# Patient Record
Sex: FEMALE | Race: WHITE | NOT HISPANIC OR LATINO | Employment: OTHER | URBAN - NONMETROPOLITAN AREA
[De-identification: names, ages, dates, MRNs, and addresses within clinical notes are randomized per-mention and may not be internally consistent; named-entity substitution may affect disease eponyms.]

---

## 2017-03-15 RX ORDER — RANITIDINE 150 MG/1
TABLET ORAL
Qty: 30 TABLET | Refills: 0 | Status: SHIPPED | OUTPATIENT
Start: 2017-03-15 | End: 2017-04-04

## 2017-04-04 ENCOUNTER — OFFICE VISIT (OUTPATIENT)
Dept: FAMILY MEDICINE CLINIC | Facility: CLINIC | Age: 57
End: 2017-04-04

## 2017-04-04 VITALS
TEMPERATURE: 98.9 F | BODY MASS INDEX: 43.32 KG/M2 | HEART RATE: 109 BPM | WEIGHT: 276 LBS | HEIGHT: 67 IN | DIASTOLIC BLOOD PRESSURE: 90 MMHG | OXYGEN SATURATION: 94 % | SYSTOLIC BLOOD PRESSURE: 150 MMHG

## 2017-04-04 DIAGNOSIS — E55.9 VITAMIN D DEFICIENCY: ICD-10-CM

## 2017-04-04 DIAGNOSIS — Z13.9 SCREENING: ICD-10-CM

## 2017-04-04 DIAGNOSIS — Z72.0 TOBACCO ABUSE: Primary | ICD-10-CM

## 2017-04-04 DIAGNOSIS — E78.2 MIXED HYPERLIPIDEMIA: ICD-10-CM

## 2017-04-04 DIAGNOSIS — G44.029 CHRONIC CLUSTER HEADACHE, NOT INTRACTABLE: ICD-10-CM

## 2017-04-04 DIAGNOSIS — E03.8 OTHER SPECIFIED HYPOTHYROIDISM: ICD-10-CM

## 2017-04-04 DIAGNOSIS — E11.9 TYPE 2 DIABETES MELLITUS WITHOUT COMPLICATION, WITHOUT LONG-TERM CURRENT USE OF INSULIN (HCC): ICD-10-CM

## 2017-04-04 PROCEDURE — 99215 OFFICE O/P EST HI 40 MIN: CPT | Performed by: NURSE PRACTITIONER

## 2017-04-04 PROCEDURE — 99406 BEHAV CHNG SMOKING 3-10 MIN: CPT | Performed by: NURSE PRACTITIONER

## 2017-04-04 RX ORDER — LANCETS 28 GAUGE
EACH MISCELLANEOUS
Qty: 100 EACH | Refills: 12 | Status: SHIPPED | OUTPATIENT
Start: 2017-04-04 | End: 2017-08-16 | Stop reason: SDUPTHER

## 2017-04-04 RX ORDER — LISINOPRIL 5 MG/1
5 TABLET ORAL DAILY
Qty: 30 TABLET | Refills: 5 | Status: SHIPPED | OUTPATIENT
Start: 2017-04-04 | End: 2017-11-16 | Stop reason: ALTCHOICE

## 2017-04-04 RX ORDER — CYCLOBENZAPRINE HCL 5 MG
5 TABLET ORAL 2 TIMES DAILY PRN
Qty: 30 TABLET | Refills: 3 | Status: SHIPPED | OUTPATIENT
Start: 2017-04-04 | End: 2017-08-16 | Stop reason: SDUPTHER

## 2017-04-04 RX ORDER — LORATADINE 10 MG/1
10 TABLET ORAL DAILY PRN
Qty: 30 TABLET | Refills: 5 | Status: SHIPPED | OUTPATIENT
Start: 2017-04-04 | End: 2017-08-16 | Stop reason: SDUPTHER

## 2017-04-04 RX ORDER — PROMETHAZINE HYDROCHLORIDE 25 MG/1
25 TABLET ORAL EVERY 8 HOURS PRN
Qty: 20 TABLET | Refills: 3 | Status: SHIPPED | OUTPATIENT
Start: 2017-04-04 | End: 2017-08-16 | Stop reason: SDUPTHER

## 2017-04-04 RX ORDER — RANITIDINE 150 MG/1
150 TABLET ORAL NIGHTLY
Qty: 30 TABLET | Refills: 3 | Status: SHIPPED | OUTPATIENT
Start: 2017-04-04 | End: 2017-08-16 | Stop reason: SDUPTHER

## 2017-04-04 RX ORDER — IBUPROFEN 600 MG/1
600 TABLET ORAL EVERY 8 HOURS PRN
Qty: 30 TABLET | Refills: 3 | Status: SHIPPED | OUTPATIENT
Start: 2017-04-04 | End: 2017-08-16

## 2017-04-04 NOTE — PROGRESS NOTES
Subjective   Donna Israel is a 57 y.o. female.     History of Present Illness       Pt states that she has not been seen by a health provider for a few years.   She is requesting refills on her medication though she does report that she has not been on any medication for approximately a year.     Tobacco abuse  Smoker, approximately one pack a day for many years.     Cluster headaches  Diagnosed years ago. She is requesting phenergan, Ibuprofen and flexeril as in the past this helped her headaches. No known triggers. Headaches are less often than daily.     Type 2 diabetes   Pt reports gradual weight gain over the past year. She has not been checking her glucose or taking any medication. She admits to a poor diet. No recent labs.     Hypothyroidism  No recent medication or labs.     Hyperlipidemia  No recent medication or labs.    Vitamin D def  No recent medication or labs.     Hypertension  Has not been taking any medication. Not exercising. No recent labs.     GERD  States that her symptoms are controlled with zantac. She does admit to eating spicy foods. No recent EGD. Never had a colonoscopy.    The following portions of the patient's history were reviewed and updated as appropriate: allergies, current medications, past family history, past medical history, past social history, past surgical history and problem list.    Review of Systems   Constitutional: Negative for appetite change, chills, fatigue and fever.   HENT: Positive for sneezing.    Eyes: Positive for itching.   Respiratory: Negative for cough, chest tightness, shortness of breath and wheezing.    Cardiovascular: Positive for leg swelling. Negative for chest pain and palpitations.   Gastrointestinal: Negative for abdominal pain, blood in stool, constipation, diarrhea, nausea and vomiting.   Endocrine: Negative.    Genitourinary: Negative for dysuria.   Musculoskeletal: Positive for arthralgias, back pain and myalgias.   Skin: Negative for rash.    Allergic/Immunologic: Negative.    Neurological: Positive for headaches. Negative for syncope and speech difficulty.   Hematological: Negative.    Psychiatric/Behavioral: Negative for self-injury and suicidal ideas.   All other systems reviewed and are negative.      Objective   Physical Exam   Constitutional: She is oriented to person, place, and time. She appears well-developed and well-nourished. No distress.   HENT:   Head: Normocephalic.   Right Ear: External ear normal.   Left Ear: External ear normal.   Nose: Nose normal.   Mouth/Throat: Oropharynx is clear and moist. No oropharyngeal exudate.   Eyes: Lids are normal. Pupils are equal, round, and reactive to light.   Neck: Normal range of motion. Neck supple. No tracheal deviation present. No thyromegaly present.   Cardiovascular: Normal rate, regular rhythm and normal heart sounds.  Exam reveals no gallop and no friction rub.    No murmur heard.  Pulmonary/Chest: Effort normal and breath sounds normal. No respiratory distress. She has no wheezes. She has no rales. She exhibits no tenderness.   Abdominal: Soft. Normal appearance and bowel sounds are normal. She exhibits no distension and no mass. There is no tenderness. There is no rebound and no guarding.   Musculoskeletal: Normal range of motion.        Right knee: Tenderness found.        Left knee: Tenderness found.   Some generalized edema of lower extremities bilateral, non-pitting.    Neurological: She is alert and oriented to person, place, and time. She has normal reflexes.   CN 2-12 grossly intact    Skin: Skin is warm and dry. No rash noted. She is not diaphoretic. No erythema.   Psychiatric: She has a normal mood and affect. Her speech is normal and behavior is normal. Judgment and thought content normal. Cognition and memory are normal.   Vitals reviewed.      Assessment/Plan   Donna was seen today for establish care.    Diagnoses and all orders for this visit:    Tobacco abuse    Type 2  diabetes mellitus without complication, without long-term current use of insulin  -     CBC & Differential; Future  -     Comprehensive Metabolic Panel; Future  -     Lipid Panel; Future  -     TSH; Future  -     Hemoglobin A1c; Future    Chronic cluster headache, not intractable    Other specified hypothyroidism    Mixed hyperlipidemia    Vitamin D deficiency  -     Vitamin D 25 Hydroxy; Future    Screening  -     Vitamin B12; Future  -     Ambulatory Referral For Screening Colonoscopy  -     Ambulatory referral for Screening EGD    Other orders  -     promethazine (PHENERGAN) 25 MG tablet; Take 1 tablet by mouth Every 8 (Eight) Hours As Needed for Nausea or Vomiting.  -     ibuprofen (ADVIL,MOTRIN) 600 MG tablet; Take 1 tablet by mouth Every 8 (Eight) Hours As Needed for Mild Pain (1-3) or Headache.  -     cyclobenzaprine (FLEXERIL) 5 MG tablet; Take 1 tablet by mouth 2 (Two) Times a Day As Needed for Muscle Spasms.  -     lisinopril (PRINIVIL,ZESTRIL) 5 MG tablet; Take 1 tablet by mouth Daily.  -     raNITIdine (ZANTAC) 150 MG tablet; Take 1 tablet by mouth Every Night.  -     loratadine (CLARITIN) 10 MG tablet; Take 1 tablet by mouth Daily As Needed for Allergies.  -     Blood Glucose Monitoring Suppl kit; 1 kit 2 (Two) Times a Day.  -     glucose blood (COOL BLOOD GLUCOSE TEST STRIPS) test strip; Use as instructed  -     Lancets (FREESTYLE) lancets; Use as instructed      I have discussed diagnosis in detail today allowing time for questions and answers. Pt is aware of reasons to seek urgent or emergent medical care as well as reasons to return to the clinic for evaluation. Possible side effects, interactions and progression of symptoms discussed as well. Pt / family states understanding.   Will order fasting labs prior to starting diabetes and thyroid medications.   Have instructed pt that she is not to take phenergan, flexeril or Ibuprofen on a daily basis and that these medications are to be used for prn  headache treatment only. She states understanding  Refer for colonoscopy and EGD for screening.  Start testing blood sugar twice daily, fasting and two hours post meal. Keep and bring log to next visit.   Pt has been educated/instructed on diabetic care and protocols. Sick day rules reviewed. Continue to monitor blood sugar and report abnormal readings as discussed today. Pt / family state understanding.   Follow up 4-6 weeks, sooner if needed.   Time > 50 minutes  Smoke cessation education provided > 3 minutes. Pt declines smoke cessation at this time.

## 2017-04-07 ENCOUNTER — LAB (OUTPATIENT)
Dept: FAMILY MEDICINE CLINIC | Facility: CLINIC | Age: 57
End: 2017-04-07

## 2017-04-07 DIAGNOSIS — Z13.9 SCREENING: ICD-10-CM

## 2017-04-07 DIAGNOSIS — E11.9 TYPE 2 DIABETES MELLITUS WITHOUT COMPLICATION, WITHOUT LONG-TERM CURRENT USE OF INSULIN (HCC): ICD-10-CM

## 2017-04-07 DIAGNOSIS — E55.9 VITAMIN D DEFICIENCY: ICD-10-CM

## 2017-04-07 LAB
25(OH)D3 SERPL-MCNC: 6 NG/ML
ALBUMIN SERPL-MCNC: 4.8 G/DL (ref 3.5–5)
ALBUMIN/GLOB SERPL: 1.7 G/DL (ref 1.5–2.5)
ALP SERPL-CCNC: 101 U/L (ref 35–104)
ALT SERPL W P-5'-P-CCNC: 67 U/L (ref 10–36)
ANION GAP SERPL CALCULATED.3IONS-SCNC: 6.9 MMOL/L (ref 3.6–11.2)
AST SERPL-CCNC: 73 U/L (ref 10–30)
BASOPHILS # BLD AUTO: 0.07 10*3/MM3 (ref 0–0.3)
BASOPHILS NFR BLD AUTO: 1.1 % (ref 0–2)
BILIRUB SERPL-MCNC: 0.7 MG/DL (ref 0.2–1.8)
BUN BLD-MCNC: 9 MG/DL (ref 7–21)
BUN/CREAT SERPL: 8.6 (ref 7–25)
CALCIUM SPEC-SCNC: 9.1 MG/DL (ref 7.7–10)
CHLORIDE SERPL-SCNC: 102 MMOL/L (ref 99–112)
CHOLEST SERPL-MCNC: 284 MG/DL (ref 0–200)
CO2 SERPL-SCNC: 25.1 MMOL/L (ref 24.3–31.9)
CREAT BLD-MCNC: 1.05 MG/DL (ref 0.43–1.29)
DEPRECATED RDW RBC AUTO: 47.6 FL (ref 37–54)
EOSINOPHIL # BLD AUTO: 0.16 10*3/MM3 (ref 0–0.7)
EOSINOPHIL NFR BLD AUTO: 2.5 % (ref 0–5)
ERYTHROCYTE [DISTWIDTH] IN BLOOD BY AUTOMATED COUNT: 13.7 % (ref 11.5–14.5)
GFR SERPL CREATININE-BSD FRML MDRD: 54 ML/MIN/1.73
GLOBULIN UR ELPH-MCNC: 2.8 GM/DL
GLUCOSE BLD-MCNC: 237 MG/DL (ref 70–110)
HBA1C MFR BLD: 9.8 % (ref 4.5–5.7)
HCT VFR BLD AUTO: 43.7 % (ref 37–47)
HDLC SERPL-MCNC: 39 MG/DL (ref 60–100)
HGB BLD-MCNC: 14.7 G/DL (ref 12–16)
IMM GRANULOCYTES # BLD: 0.07 10*3/MM3 (ref 0–0.03)
IMM GRANULOCYTES NFR BLD: 1.1 % (ref 0–0.5)
LDLC SERPL CALC-MCNC: ABNORMAL MG/DL (ref 0–100)
LDLC/HDLC SERPL: ABNORMAL {RATIO}
LYMPHOCYTES # BLD AUTO: 1.85 10*3/MM3 (ref 1–3)
LYMPHOCYTES NFR BLD AUTO: 29 % (ref 21–51)
MCH RBC QN AUTO: 32.8 PG (ref 27–33)
MCHC RBC AUTO-ENTMCNC: 33.6 G/DL (ref 33–37)
MCV RBC AUTO: 97.5 FL (ref 80–94)
MONOCYTES # BLD AUTO: 0.31 10*3/MM3 (ref 0.1–0.9)
MONOCYTES NFR BLD AUTO: 4.9 % (ref 0–10)
NEUTROPHILS # BLD AUTO: 3.92 10*3/MM3 (ref 1.4–6.5)
NEUTROPHILS NFR BLD AUTO: 61.4 % (ref 30–70)
OSMOLALITY SERPL CALC.SUM OF ELEC: 274.6 MOSM/KG (ref 273–305)
PLATELET # BLD AUTO: 185 10*3/MM3 (ref 130–400)
PMV BLD AUTO: 9.9 FL (ref 6–10)
POTASSIUM BLD-SCNC: 4 MMOL/L (ref 3.5–5.3)
PROT SERPL-MCNC: 7.6 G/DL (ref 6–8)
RBC # BLD AUTO: 4.48 10*6/MM3 (ref 4.2–5.4)
SODIUM BLD-SCNC: 134 MMOL/L (ref 135–153)
TRIGL SERPL-MCNC: 926 MG/DL (ref 0–150)
TSH SERPL DL<=0.05 MIU/L-ACNC: >150 MIU/ML (ref 0.55–4.78)
VIT B12 BLD-MCNC: 471 PG/ML (ref 211–911)
VLDLC SERPL-MCNC: ABNORMAL MG/DL
WBC NRBC COR # BLD: 6.38 10*3/MM3 (ref 4.5–12.5)

## 2017-04-07 PROCEDURE — 85025 COMPLETE CBC W/AUTO DIFF WBC: CPT | Performed by: NURSE PRACTITIONER

## 2017-04-07 PROCEDURE — 82306 VITAMIN D 25 HYDROXY: CPT | Performed by: NURSE PRACTITIONER

## 2017-04-07 PROCEDURE — 80061 LIPID PANEL: CPT | Performed by: NURSE PRACTITIONER

## 2017-04-07 PROCEDURE — 36415 COLL VENOUS BLD VENIPUNCTURE: CPT

## 2017-04-07 PROCEDURE — 84443 ASSAY THYROID STIM HORMONE: CPT | Performed by: NURSE PRACTITIONER

## 2017-04-07 PROCEDURE — 83036 HEMOGLOBIN GLYCOSYLATED A1C: CPT | Performed by: NURSE PRACTITIONER

## 2017-04-07 PROCEDURE — 82607 VITAMIN B-12: CPT | Performed by: NURSE PRACTITIONER

## 2017-04-07 PROCEDURE — 80053 COMPREHEN METABOLIC PANEL: CPT | Performed by: NURSE PRACTITIONER

## 2017-04-12 RX ORDER — ATORVASTATIN CALCIUM 20 MG/1
20 TABLET, FILM COATED ORAL DAILY
Qty: 30 TABLET | Refills: 5 | Status: SHIPPED | OUTPATIENT
Start: 2017-04-12 | End: 2017-08-08 | Stop reason: DRUGHIGH

## 2017-04-12 RX ORDER — LEVOTHYROXINE SODIUM 137 UG/1
137 TABLET ORAL DAILY
Qty: 30 TABLET | Refills: 5 | Status: SHIPPED | OUTPATIENT
Start: 2017-04-12 | End: 2017-08-16 | Stop reason: SDUPTHER

## 2017-05-16 ENCOUNTER — OFFICE VISIT (OUTPATIENT)
Dept: FAMILY MEDICINE CLINIC | Facility: CLINIC | Age: 57
End: 2017-05-16

## 2017-05-16 VITALS
DIASTOLIC BLOOD PRESSURE: 80 MMHG | SYSTOLIC BLOOD PRESSURE: 120 MMHG | BODY MASS INDEX: 43.47 KG/M2 | WEIGHT: 277 LBS | HEIGHT: 67 IN | HEART RATE: 95 BPM | TEMPERATURE: 99.2 F | OXYGEN SATURATION: 97 %

## 2017-05-16 DIAGNOSIS — E78.2 MIXED HYPERLIPIDEMIA: ICD-10-CM

## 2017-05-16 DIAGNOSIS — E03.9 ACQUIRED HYPOTHYROIDISM: ICD-10-CM

## 2017-05-16 DIAGNOSIS — E55.9 VITAMIN D DEFICIENCY: ICD-10-CM

## 2017-05-16 DIAGNOSIS — G44.029 CHRONIC CLUSTER HEADACHE, NOT INTRACTABLE: ICD-10-CM

## 2017-05-16 DIAGNOSIS — E11.9 TYPE 2 DIABETES MELLITUS WITHOUT COMPLICATION, WITHOUT LONG-TERM CURRENT USE OF INSULIN (HCC): Primary | ICD-10-CM

## 2017-05-16 DIAGNOSIS — R94.4 DECREASED GLOMERULAR FILTRATION RATE (GFR): ICD-10-CM

## 2017-05-16 PROCEDURE — 99214 OFFICE O/P EST MOD 30 MIN: CPT | Performed by: NURSE PRACTITIONER

## 2017-05-16 RX ORDER — ERGOCALCIFEROL 1.25 MG/1
50000 CAPSULE ORAL WEEKLY
Qty: 4 CAPSULE | Refills: 5 | Status: SHIPPED | OUTPATIENT
Start: 2017-05-16 | End: 2017-08-16 | Stop reason: SDUPTHER

## 2017-06-19 ENCOUNTER — TRANSCRIBE ORDERS (OUTPATIENT)
Dept: FAMILY MEDICINE CLINIC | Facility: CLINIC | Age: 57
End: 2017-06-19

## 2017-06-19 DIAGNOSIS — E11.9 TYPE 2 DIABETES MELLITUS WITHOUT COMPLICATION, UNSPECIFIED LONG TERM INSULIN USE STATUS: Primary | ICD-10-CM

## 2017-08-08 ENCOUNTER — LAB (OUTPATIENT)
Dept: FAMILY MEDICINE CLINIC | Facility: CLINIC | Age: 57
End: 2017-08-08

## 2017-08-08 DIAGNOSIS — E11.9 TYPE 2 DIABETES MELLITUS WITHOUT COMPLICATION, WITHOUT LONG-TERM CURRENT USE OF INSULIN (HCC): ICD-10-CM

## 2017-08-08 DIAGNOSIS — E55.9 VITAMIN D DEFICIENCY: ICD-10-CM

## 2017-08-08 DIAGNOSIS — E78.2 MIXED HYPERLIPIDEMIA: ICD-10-CM

## 2017-08-08 LAB
25(OH)D3 SERPL-MCNC: 34 NG/ML
ALBUMIN SERPL-MCNC: 4.7 G/DL (ref 3.5–5)
ALBUMIN/GLOB SERPL: 1.7 G/DL (ref 1.5–2.5)
ALP SERPL-CCNC: 80 U/L (ref 35–104)
ALT SERPL W P-5'-P-CCNC: 46 U/L (ref 10–36)
ANION GAP SERPL CALCULATED.3IONS-SCNC: 6.8 MMOL/L (ref 3.6–11.2)
AST SERPL-CCNC: 48 U/L (ref 10–30)
BASOPHILS # BLD AUTO: 0.05 10*3/MM3 (ref 0–0.3)
BASOPHILS NFR BLD AUTO: 0.6 % (ref 0–2)
BILIRUB SERPL-MCNC: 0.4 MG/DL (ref 0.2–1.8)
BUN BLD-MCNC: 10 MG/DL (ref 7–21)
BUN/CREAT SERPL: 11.5 (ref 7–25)
CALCIUM SPEC-SCNC: 9.7 MG/DL (ref 7.7–10)
CHLORIDE SERPL-SCNC: 111 MMOL/L (ref 99–112)
CHOLEST SERPL-MCNC: 221 MG/DL (ref 0–200)
CO2 SERPL-SCNC: 21.2 MMOL/L (ref 24.3–31.9)
CREAT BLD-MCNC: 0.87 MG/DL (ref 0.43–1.29)
DEPRECATED RDW RBC AUTO: 48.2 FL (ref 37–54)
EOSINOPHIL # BLD AUTO: 0.2 10*3/MM3 (ref 0–0.7)
EOSINOPHIL NFR BLD AUTO: 2.5 % (ref 0–5)
ERYTHROCYTE [DISTWIDTH] IN BLOOD BY AUTOMATED COUNT: 13 % (ref 11.5–14.5)
GFR SERPL CREATININE-BSD FRML MDRD: 67 ML/MIN/1.73
GLOBULIN UR ELPH-MCNC: 2.8 GM/DL
GLUCOSE BLD-MCNC: 177 MG/DL (ref 70–110)
HBA1C MFR BLD: 6.5 % (ref 4.5–5.7)
HCT VFR BLD AUTO: 43.4 % (ref 37–47)
HDLC SERPL-MCNC: 29 MG/DL (ref 60–100)
HGB BLD-MCNC: 14.2 G/DL (ref 12–16)
IMM GRANULOCYTES # BLD: 0.03 10*3/MM3 (ref 0–0.03)
IMM GRANULOCYTES NFR BLD: 0.4 % (ref 0–0.5)
LDLC SERPL CALC-MCNC: ABNORMAL MG/DL (ref 0–100)
LDLC/HDLC SERPL: ABNORMAL {RATIO}
LYMPHOCYTES # BLD AUTO: 1.71 10*3/MM3 (ref 1–3)
LYMPHOCYTES NFR BLD AUTO: 21.7 % (ref 21–51)
MCH RBC QN AUTO: 33.6 PG (ref 27–33)
MCHC RBC AUTO-ENTMCNC: 32.7 G/DL (ref 33–37)
MCV RBC AUTO: 102.8 FL (ref 80–94)
MONOCYTES # BLD AUTO: 0.39 10*3/MM3 (ref 0.1–0.9)
MONOCYTES NFR BLD AUTO: 4.9 % (ref 0–10)
NEUTROPHILS # BLD AUTO: 5.51 10*3/MM3 (ref 1.4–6.5)
NEUTROPHILS NFR BLD AUTO: 69.9 % (ref 30–70)
OSMOLALITY SERPL CALC.SUM OF ELEC: 280.9 MOSM/KG (ref 273–305)
PLATELET # BLD AUTO: 217 10*3/MM3 (ref 130–400)
PMV BLD AUTO: 10 FL (ref 6–10)
POTASSIUM BLD-SCNC: 3.9 MMOL/L (ref 3.5–5.3)
PROT SERPL-MCNC: 7.5 G/DL (ref 6–8)
RBC # BLD AUTO: 4.22 10*6/MM3 (ref 4.2–5.4)
SODIUM BLD-SCNC: 139 MMOL/L (ref 135–153)
TRIGL SERPL-MCNC: 897 MG/DL (ref 0–150)
TSH SERPL DL<=0.05 MIU/L-ACNC: 94.64 MIU/ML (ref 0.55–4.78)
VLDLC SERPL-MCNC: ABNORMAL MG/DL
WBC NRBC COR # BLD: 7.89 10*3/MM3 (ref 4.5–12.5)

## 2017-08-08 PROCEDURE — 85025 COMPLETE CBC W/AUTO DIFF WBC: CPT | Performed by: NURSE PRACTITIONER

## 2017-08-08 PROCEDURE — 84443 ASSAY THYROID STIM HORMONE: CPT | Performed by: NURSE PRACTITIONER

## 2017-08-08 PROCEDURE — 80061 LIPID PANEL: CPT | Performed by: NURSE PRACTITIONER

## 2017-08-08 PROCEDURE — 36415 COLL VENOUS BLD VENIPUNCTURE: CPT

## 2017-08-08 PROCEDURE — 80053 COMPREHEN METABOLIC PANEL: CPT | Performed by: NURSE PRACTITIONER

## 2017-08-08 PROCEDURE — 83036 HEMOGLOBIN GLYCOSYLATED A1C: CPT | Performed by: NURSE PRACTITIONER

## 2017-08-08 PROCEDURE — 82306 VITAMIN D 25 HYDROXY: CPT | Performed by: NURSE PRACTITIONER

## 2017-08-08 NOTE — PROGRESS NOTES
Called and informed patient about med change and to start the lopid and to try to stay with a heart healthy diet. Called in fills to pharmacy and placed in chart. PKF

## 2017-08-13 RX ORDER — ATORVASTATIN CALCIUM 40 MG/1
40 TABLET, FILM COATED ORAL DAILY
Qty: 30 TABLET | Refills: 2
Start: 2017-08-13 | End: 2017-08-16 | Stop reason: SDUPTHER

## 2017-08-13 RX ORDER — GEMFIBROZIL 600 MG/1
600 TABLET, FILM COATED ORAL
Qty: 60 TABLET | Refills: 2
Start: 2017-08-13 | End: 2017-08-16 | Stop reason: SDUPTHER

## 2017-08-16 ENCOUNTER — OFFICE VISIT (OUTPATIENT)
Dept: FAMILY MEDICINE CLINIC | Facility: CLINIC | Age: 57
End: 2017-08-16

## 2017-08-16 VITALS
SYSTOLIC BLOOD PRESSURE: 120 MMHG | HEART RATE: 78 BPM | WEIGHT: 253 LBS | OXYGEN SATURATION: 94 % | BODY MASS INDEX: 39.71 KG/M2 | TEMPERATURE: 97.5 F | DIASTOLIC BLOOD PRESSURE: 80 MMHG | HEIGHT: 67 IN

## 2017-08-16 DIAGNOSIS — E78.2 MIXED HYPERLIPIDEMIA: ICD-10-CM

## 2017-08-16 DIAGNOSIS — K21.9 GASTROESOPHAGEAL REFLUX DISEASE, ESOPHAGITIS PRESENCE NOT SPECIFIED: ICD-10-CM

## 2017-08-16 DIAGNOSIS — Z72.0 TOBACCO ABUSE: ICD-10-CM

## 2017-08-16 DIAGNOSIS — N18.2 TYPE 2 DIABETES MELLITUS WITH STAGE 2 CHRONIC KIDNEY DISEASE, WITHOUT LONG-TERM CURRENT USE OF INSULIN (HCC): ICD-10-CM

## 2017-08-16 DIAGNOSIS — E11.22 TYPE 2 DIABETES MELLITUS WITH STAGE 2 CHRONIC KIDNEY DISEASE, WITHOUT LONG-TERM CURRENT USE OF INSULIN (HCC): ICD-10-CM

## 2017-08-16 DIAGNOSIS — E11.9 TYPE 2 DIABETES MELLITUS WITHOUT COMPLICATION, WITHOUT LONG-TERM CURRENT USE OF INSULIN (HCC): ICD-10-CM

## 2017-08-16 DIAGNOSIS — E11.59 TYPE 2 DIABETES MELLITUS WITH OTHER CIRCULATORY COMPLICATION, WITHOUT LONG-TERM CURRENT USE OF INSULIN (HCC): ICD-10-CM

## 2017-08-16 DIAGNOSIS — E55.9 VITAMIN D DEFICIENCY: ICD-10-CM

## 2017-08-16 DIAGNOSIS — E03.9 ACQUIRED HYPOTHYROIDISM: ICD-10-CM

## 2017-08-16 DIAGNOSIS — E78.2 MIXED HYPERLIPIDEMIA: Primary | ICD-10-CM

## 2017-08-16 DIAGNOSIS — G44.029 CHRONIC CLUSTER HEADACHE, NOT INTRACTABLE: ICD-10-CM

## 2017-08-16 DIAGNOSIS — M50.30 DEGENERATION OF INTERVERTEBRAL DISC OF CERVICAL REGION: ICD-10-CM

## 2017-08-16 LAB
CHOLEST SERPL-MCNC: 197 MG/DL (ref 0–200)
HDLC SERPL-MCNC: 29 MG/DL (ref 60–100)
LDLC SERPL CALC-MCNC: ABNORMAL MG/DL (ref 0–100)
LDLC/HDLC SERPL: ABNORMAL {RATIO}
TRIGL SERPL-MCNC: 448 MG/DL (ref 0–150)
TSH SERPL DL<=0.05 MIU/L-ACNC: 74.98 MIU/ML (ref 0.55–4.78)
VLDLC SERPL-MCNC: ABNORMAL MG/DL

## 2017-08-16 PROCEDURE — 84443 ASSAY THYROID STIM HORMONE: CPT | Performed by: NURSE PRACTITIONER

## 2017-08-16 PROCEDURE — 99407 BEHAV CHNG SMOKING > 10 MIN: CPT | Performed by: NURSE PRACTITIONER

## 2017-08-16 PROCEDURE — 99215 OFFICE O/P EST HI 40 MIN: CPT | Performed by: NURSE PRACTITIONER

## 2017-08-16 PROCEDURE — 36415 COLL VENOUS BLD VENIPUNCTURE: CPT | Performed by: NURSE PRACTITIONER

## 2017-08-16 PROCEDURE — 80061 LIPID PANEL: CPT | Performed by: NURSE PRACTITIONER

## 2017-08-16 RX ORDER — PROMETHAZINE HYDROCHLORIDE 25 MG/1
25 TABLET ORAL EVERY 8 HOURS PRN
Qty: 20 TABLET | Refills: 3 | Status: SHIPPED | OUTPATIENT
Start: 2017-08-16 | End: 2017-11-16 | Stop reason: SDUPTHER

## 2017-08-16 RX ORDER — ERGOCALCIFEROL 1.25 MG/1
50000 CAPSULE ORAL WEEKLY
Qty: 4 CAPSULE | Refills: 5 | Status: SHIPPED | OUTPATIENT
Start: 2017-08-16 | End: 2017-11-16 | Stop reason: SDUPTHER

## 2017-08-16 RX ORDER — CYCLOBENZAPRINE HCL 5 MG
5 TABLET ORAL 2 TIMES DAILY PRN
Qty: 30 TABLET | Refills: 3 | Status: SHIPPED | OUTPATIENT
Start: 2017-08-16 | End: 2017-11-16 | Stop reason: SDUPTHER

## 2017-08-16 RX ORDER — LANCETS 28 GAUGE
EACH MISCELLANEOUS
Qty: 100 EACH | Refills: 12 | Status: SHIPPED | OUTPATIENT
Start: 2017-08-16 | End: 2017-11-16 | Stop reason: SDUPTHER

## 2017-08-16 RX ORDER — LORATADINE 10 MG/1
10 TABLET ORAL DAILY PRN
Qty: 30 TABLET | Refills: 5 | Status: SHIPPED | OUTPATIENT
Start: 2017-08-16 | End: 2017-11-16 | Stop reason: SDUPTHER

## 2017-08-16 RX ORDER — ATORVASTATIN CALCIUM 40 MG/1
40 TABLET, FILM COATED ORAL DAILY
Qty: 30 TABLET | Refills: 2
Start: 2017-08-16 | End: 2017-11-16 | Stop reason: SDUPTHER

## 2017-08-16 RX ORDER — RANITIDINE 150 MG/1
150 TABLET ORAL NIGHTLY
Qty: 30 TABLET | Refills: 3 | Status: SHIPPED | OUTPATIENT
Start: 2017-08-16 | End: 2017-11-16 | Stop reason: SDUPTHER

## 2017-08-16 RX ORDER — LEVOTHYROXINE SODIUM 150 UG/1
150 CAPSULE ORAL DAILY
Qty: 30 CAPSULE | Refills: 3
Start: 2017-08-16 | End: 2017-11-16 | Stop reason: SDUPTHER

## 2017-08-16 RX ORDER — NICOTINE 21 MG/24HR
1 PATCH, TRANSDERMAL 24 HOURS TRANSDERMAL EVERY 24 HOURS
Qty: 28 PATCH | Refills: 1 | Status: SHIPPED | OUTPATIENT
Start: 2017-08-16 | End: 2017-12-15 | Stop reason: ALTCHOICE

## 2017-08-16 RX ORDER — NICOTINE 21 MG/24HR
1 PATCH, TRANSDERMAL 24 HOURS TRANSDERMAL EVERY 24 HOURS
Qty: 14 PATCH | Refills: 0 | Status: SHIPPED | OUTPATIENT
Start: 2017-08-16 | End: 2017-12-15 | Stop reason: ALTCHOICE

## 2017-08-16 RX ORDER — LEVOTHYROXINE SODIUM 137 UG/1
137 TABLET ORAL DAILY
Qty: 30 TABLET | Refills: 5 | Status: SHIPPED | OUTPATIENT
Start: 2017-08-16 | End: 2017-08-16 | Stop reason: DRUGHIGH

## 2017-08-16 RX ORDER — GEMFIBROZIL 600 MG/1
600 TABLET, FILM COATED ORAL
Qty: 60 TABLET | Refills: 2
Start: 2017-08-16 | End: 2017-11-16 | Stop reason: SDUPTHER

## 2017-08-16 NOTE — ASSESSMENT & PLAN NOTE
Lipid abnormalities are worsening.  Nutritional counseling was provided. and Pharmacotherapy as ordered.  Lipids will be reassessed in 3 months.  We will repeat a lipid panel today to ensure fasting status.

## 2017-08-16 NOTE — ASSESSMENT & PLAN NOTE
GERD precautions have been discussed.  We have reviewed the risk of taking an H2 blocker along with a PPI.  Patient states that her symptoms are well controlled and she does not wish to change her current medication therapy.

## 2017-08-16 NOTE — PROGRESS NOTES
Subjective   Donna Israel is a 57 y.o. female.     Chief Complaint   Patient presents with   • Headache   • Heartburn   • Hyperlipidemia   • Hypothyroidism   • Diabetes       History of Present Illness     Patient is here today for follow-up and review of recent lab values.  She has been back on her routine medications for approximately 3 months.  She does report compliance with her medications.    Chronic kidney disease stage II-patient has been seen by Dr. Alfaro for nephrology consult.  He has stopped her lisinopril as well as stop all anti-inflammatory medication.    Chronic headaches -   Less often than daily.  She reports that her symptoms are controlled with current medication regimen.  Taking Phenergan is always helpful when she has a headache to decrease nausea associated with headache.    Type 2 diabetes with circulatory complication and renal complications-patient is under the care of Dr. Alfaro for nephrology.  She has shown an improvement in glucose levels and A1 C is now 6.5.  Patient states she is compliant with her oral medications and has been working on diet.  She denies any symptomatic hypoglycemia or hypoglycemic episodes.  She has not brought her log in today.    Vitamin D deficiency - vitamin D is 34 on weekly supplement.    Hypothyroidism - patient had been off of her Synthroid for several months.  She has been back on the Synthroid 137 µg daily for the past 3 months.  TSH reading is 94.63.  She denies fatigue or other symptoms of uncontrolled hypothyroidism.    Hypertension - stable.    Hyperlipidemia - patient had quit all cholesterol medications.  She has been back on her cholesterol medication Lipitor 40 mg 1 by mouth daily for the past 3 months.  Recent lipid panel shows total cholesterol of 221, triglyceride level is 897.  Patient states she thinks she was fasting today that that was drawn.  She has been making some dietary adjustments.  She has not started Lopid which was called in  following most recent labs.  She states she will pick this up and start today.    Tobacco abuse - patient states she smokes less than one pack a day.  She would like to quit smoking.  She has tried Chantix in the past and was successful in smoke cessation for a period of 9 months.  She states that she does not think her insurance covers Chantix now.  She has not tried the patches in the past but is willing.    Osteoarthritis - patient has chronic osteoarthritis of the low back, shoulders, hips and knees.  She does report some joint stiffness and aching.  She is no longer taking any anti-inflammatory medication.  She doesn't times take over-the-counter regular strength Tylenol which is helpful in decreasing her pain and stiffness.        The following portions of the patient's history were reviewed and updated as appropriate: allergies, current medications, past family history, past medical history, past social history, past surgical history and problem list.    Review of Systems   Constitutional: Negative for activity change, appetite change, chills, fatigue, fever and unexpected weight change.   HENT: Negative for ear pain, facial swelling, hearing loss, sinus pressure, sore throat, trouble swallowing and voice change.    Eyes: Negative for pain, discharge and visual disturbance.   Respiratory: Negative for apnea, cough, chest tightness, shortness of breath and wheezing.    Cardiovascular: Positive for leg swelling. Negative for chest pain and palpitations.   Gastrointestinal: Negative for abdominal pain, blood in stool, constipation, diarrhea, nausea and vomiting.   Endocrine: Negative.    Genitourinary: Negative for dysuria.   Musculoskeletal: Positive for arthralgias, back pain and myalgias. Negative for neck stiffness.   Skin: Negative for color change and wound.   Allergic/Immunologic: Negative.    Neurological: Negative for headaches.   Hematological: Negative for adenopathy.   Psychiatric/Behavioral:  "Negative for confusion, decreased concentration, self-injury, sleep disturbance and suicidal ideas. The patient is not nervous/anxious.    All other systems reviewed and are negative.      Objective     /80 (BP Location: Right arm, Patient Position: Sitting, Cuff Size: Adult)  Pulse 78  Temp 97.5 °F (36.4 °C) (Tympanic)   Ht 67\" (170.2 cm)  Wt 253 lb (115 kg)  SpO2 94%  BMI 39.63 kg/m2  Lab on 08/08/2017   Component Date Value Ref Range Status   • Glucose 08/08/2017 177* 70 - 110 mg/dL Final   • BUN 08/08/2017 10  7 - 21 mg/dL Final   • Creatinine 08/08/2017 0.87  0.43 - 1.29 mg/dL Final   • Sodium 08/08/2017 139  135 - 153 mmol/L Final   • Potassium 08/08/2017 3.9  3.5 - 5.3 mmol/L Final   • Chloride 08/08/2017 111  99 - 112 mmol/L Final   • CO2 08/08/2017 21.2* 24.3 - 31.9 mmol/L Final   • Calcium 08/08/2017 9.7  7.7 - 10.0 mg/dL Final   • Total Protein 08/08/2017 7.5  6.0 - 8.0 g/dL Final   • Albumin 08/08/2017 4.70  3.50 - 5.00 g/dL Final   • ALT (SGPT) 08/08/2017 46* 10 - 36 U/L Final   • AST (SGOT) 08/08/2017 48* 10 - 30 U/L Final   • Alkaline Phosphatase 08/08/2017 80  35 - 104 U/L Final   • Total Bilirubin 08/08/2017 0.4  0.2 - 1.8 mg/dL Final   • eGFR Non  Amer 08/08/2017 67  >60 mL/min/1.73 Final   • Globulin 08/08/2017 2.8  gm/dL Final   • A/G Ratio 08/08/2017 1.7  1.5 - 2.5 g/dL Final   • BUN/Creatinine Ratio 08/08/2017 11.5  7.0 - 25.0 Final   • Anion Gap 08/08/2017 6.8  3.6 - 11.2 mmol/L Final   • Total Cholesterol 08/08/2017 221* 0 - 200 mg/dL Final   • Triglycerides 08/08/2017 897* 0 - 150 mg/dL Final   • HDL Cholesterol 08/08/2017 29* 60 - 100 mg/dL Final   • LDL Cholesterol  08/08/2017   0 - 100 mg/dL Final   • VLDL Cholesterol 08/08/2017   mg/dL Final   • LDL/HDL Ratio 08/08/2017    Final   • TSH 08/08/2017 94.636* 0.550 - 4.780 mIU/mL Final   • 25 Hydroxy, Vitamin D 08/08/2017 34.0  ng/ml Final   • Hemoglobin A1C 08/08/2017 6.50* 4.50 - 5.70 % Final   • WBC 08/08/2017 7.89  " 4.50 - 12.50 10*3/mm3 Final   • RBC 08/08/2017 4.22  4.20 - 5.40 10*6/mm3 Final   • Hemoglobin 08/08/2017 14.2  12.0 - 16.0 g/dL Final   • Hematocrit 08/08/2017 43.4  37.0 - 47.0 % Final   • MCV 08/08/2017 102.8* 80.0 - 94.0 fL Final   • MCH 08/08/2017 33.6* 27.0 - 33.0 pg Final   • MCHC 08/08/2017 32.7* 33.0 - 37.0 g/dL Final   • RDW 08/08/2017 13.0  11.5 - 14.5 % Final   • RDW-SD 08/08/2017 48.2  37.0 - 54.0 fl Final   • MPV 08/08/2017 10.0  6.0 - 10.0 fL Final   • Platelets 08/08/2017 217  130 - 400 10*3/mm3 Final   • Neutrophil % 08/08/2017 69.9  30.0 - 70.0 % Final   • Lymphocyte % 08/08/2017 21.7  21.0 - 51.0 % Final   • Monocyte % 08/08/2017 4.9  0.0 - 10.0 % Final   • Eosinophil % 08/08/2017 2.5  0.0 - 5.0 % Final   • Basophil % 08/08/2017 0.6  0.0 - 2.0 % Final   • Immature Grans % 08/08/2017 0.4  0.0 - 0.5 % Final   • Neutrophils, Absolute 08/08/2017 5.51  1.40 - 6.50 10*3/mm3 Final   • Lymphocytes, Absolute 08/08/2017 1.71  1.00 - 3.00 10*3/mm3 Final   • Monocytes, Absolute 08/08/2017 0.39  0.10 - 0.90 10*3/mm3 Final   • Eosinophils, Absolute 08/08/2017 0.20  0.00 - 0.70 10*3/mm3 Final   • Basophils, Absolute 08/08/2017 0.05  0.00 - 0.30 10*3/mm3 Final   • Immature Grans, Absolute 08/08/2017 0.03  0.00 - 0.03 10*3/mm3 Final   • Osmolality Calc 08/08/2017 280.9  273.0 - 305.0 mOsm/kg Final       Physical Exam   Constitutional: She is oriented to person, place, and time. Vital signs are normal. She appears well-developed and well-nourished. No distress.   Visibly obese.   Very pleasant and friendly today.   HENT:   Head: Normocephalic and atraumatic.   Right Ear: External ear normal.   Left Ear: External ear normal.   Nose: Nose normal.   Mouth/Throat: Oropharynx is clear and moist. No oropharyngeal exudate.   Eyes: EOM are normal. Pupils are equal, round, and reactive to light.   Neck: Normal range of motion. Neck supple. No tracheal deviation present. No thyromegaly present.   Cardiovascular: Normal rate,  regular rhythm, normal heart sounds and intact distal pulses.  Exam reveals no gallop and no friction rub.    No murmur heard.  Pulmonary/Chest: Effort normal and breath sounds normal. No respiratory distress. She has no wheezes. She has no rales. She exhibits no tenderness.   Abdominal: Soft. Bowel sounds are normal. She exhibits no distension and no mass. There is no tenderness. There is no rebound and no guarding.   Musculoskeletal:        Right ankle: She exhibits swelling.        Left ankle: She exhibits swelling.        Lumbar back: She exhibits decreased range of motion.   General stiffness in her low back, hips and knees.  Very minimal edema of bilateral ankles.  Nonpitting.  Pulses are palpable and strong bilateral lower extremities.   Lymphadenopathy:     She has no cervical adenopathy.   Neurological: She is alert and oriented to person, place, and time. She has normal reflexes.   CN 2-12 grossly intact    Skin: Skin is warm and dry. No rash noted. She is not diaphoretic. No erythema.   Psychiatric: She has a normal mood and affect. Her speech is normal and behavior is normal. Judgment and thought content normal. Cognition and memory are normal.   Vitals reviewed.      Assessment/Plan     Problem List Items Addressed This Visit        Cardiovascular and Mediastinum    Hyperlipidemia - Primary    Current Assessment & Plan     Lipid abnormalities are worsening.  Nutritional counseling was provided. and Pharmacotherapy as ordered.  Lipids will be reassessed in 3 months.  We will repeat a lipid panel today to ensure fasting status.         Relevant Medications    gemfibrozil (LOPID) 600 MG tablet    atorvastatin (LIPITOR) 40 MG tablet    Other Relevant Orders    Lipid Panel       Digestive    Gastroesophageal reflux disease    Current Assessment & Plan     GERD precautions have been discussed.  We have reviewed the risk of taking an H2 blocker along with a PPI.  Patient states that her symptoms are well  controlled and she does not wish to change her current medication therapy.         Relevant Medications    raNITIdine (ZANTAC) 150 MG tablet    Vitamin D deficiency       Endocrine    Hypothyroidism    Current Assessment & Plan     Repeat TSH today, suspect false elevation.         Relevant Medications    levothyroxine (SYNTHROID, LEVOTHROID) 137 MCG tablet    Type 2 diabetes mellitus with stage 2 chronic kidney disease, without long-term current use of insulin    Current Assessment & Plan     Renal condition is improving with lifestyle modifications.  Continue current treatment regimen.  Weight loss.  Stop smoking.  Continue current medications.  Renal condition will be reassessed in 3 months.         Relevant Medications    metFORMIN (GLUCOPHAGE) 500 MG tablet    SITagliptin (JANUVIA) 100 MG tablet       Nervous and Auditory    Chronic cluster headache, not intractable    Current Assessment & Plan     Headaches are improving with lifestyle modifications.  Continue current treatment regimen.  Counseled regarding lifestyle modifications.             Relevant Medications    cyclobenzaprine (FLEXERIL) 5 MG tablet       Musculoskeletal and Integument    Degeneration of intervertebral disc of cervical region       Other    Type 2 diabetes mellitus with circulatory disorder, without long-term current use of insulin    Current Assessment & Plan     Most recent A1c is 6.5  Pt has been educated/instructed on diabetic care and protocols. Sick day rules reviewed. Continue to monitor blood sugar and report abnormal readings as discussed today. Pt / family state understanding.            Relevant Medications    metFORMIN (GLUCOPHAGE) 500 MG tablet    SITagliptin (JANUVIA) 100 MG tablet    Tobacco abuse    Current Assessment & Plan     Tobacco use is unchanged.  Smoking cessation counseling was provided.  Pharmacotherapy was prescribed as ordered.  Tobacco use will be reassessed at the next regular appointment.  Smoking  cessation counseling was provided for 11 minutes today.  Counseling was provided regarding the health risks associated with tobacco use as well as medications available to assist with smoke cessation.  We have decided to attempt smoke cessation with use of a nicotine patch.  She has been instructed on use and possible side effects.                 Current Outpatient Prescriptions:   •  atorvastatin (LIPITOR) 40 MG tablet, Take 1 tablet by mouth Daily., Disp: 30 tablet, Rfl: 2  •  Blood Glucose Monitoring Suppl kit, 1 kit 2 (Two) Times a Day., Disp: 1 each, Rfl: 0  •  cyclobenzaprine (FLEXERIL) 5 MG tablet, Take 1 tablet by mouth 2 (Two) Times a Day As Needed for Muscle Spasms., Disp: 30 tablet, Rfl: 3  •  gemfibrozil (LOPID) 600 MG tablet, Take 1 tablet by mouth 2 (Two) Times a Day Before Meals., Disp: 60 tablet, Rfl: 2  •  glucose blood (COOL BLOOD GLUCOSE TEST STRIPS) test strip, Use as instructed, Disp: 100 each, Rfl: 12  •  Lancets (FREESTYLE) lancets, Use as instructed, Disp: 100 each, Rfl: 12  •  levothyroxine (SYNTHROID, LEVOTHROID) 137 MCG tablet, Take 1 tablet by mouth Daily., Disp: 30 tablet, Rfl: 5  •  lisinopril (PRINIVIL,ZESTRIL) 5 MG tablet, Take 1 tablet by mouth Daily., Disp: 30 tablet, Rfl: 5  •  loratadine (CLARITIN) 10 MG tablet, Take 1 tablet by mouth Daily As Needed for Allergies., Disp: 30 tablet, Rfl: 5  •  metFORMIN (GLUCOPHAGE) 500 MG tablet, Take 1 tablet by mouth 2 (Two) Times a Day With Meals., Disp: 60 tablet, Rfl: 3  •  promethazine (PHENERGAN) 25 MG tablet, Take 1 tablet by mouth Every 8 (Eight) Hours As Needed for Nausea or Vomiting., Disp: 20 tablet, Rfl: 3  •  raNITIdine (ZANTAC) 150 MG tablet, Take 1 tablet by mouth Every Night., Disp: 30 tablet, Rfl: 3  •  SITagliptin (JANUVIA) 100 MG tablet, Take 1 tablet by mouth Daily., Disp: 30 tablet, Rfl: 3  •  vitamin D (ERGOCALCIFEROL) 46079 units capsule capsule, Take 1 capsule by mouth 1 (One) Time Per Week., Disp: 4 capsule, Rfl: 5  •   nicotine (NICODERM CQ) 14 MG/24HR patch, Place 1 patch on the skin Daily., Disp: 14 patch, Rfl: 0  •  nicotine (NICODERM CQ) 21 MG/24HR patch, Place 1 patch on the skin Daily., Disp: 28 patch, Rfl: 1  •  nicotine (NICODERM CQ) 7 MG/24HR patch, Place 1 patch on the skin Daily., Disp: 14 patch, Rfl: 0    I have discussed diagnosis in detail today allowing time for questions and answers. Pt is aware of reasons to seek urgent or emergent medical care as well as reasons to return to the clinic for evaluation. Possible side effects, interactions and progression of symptoms discussed as well. Pt / family states understanding.   Most recent nephrology consult note has been reviewed with patient.  Medication list has been reviewed and updated.  We will repeat a lipid panel and TSH today.  Further medication adjustments will be made pending the results of these tests.  Stop lisinopril as recommended by nephrology.  Time face-to-face with patient today was 45 minutes with 50% of this time spent counseling regarding cardiovascular risk associated with diabetes and uncontrolled hyperlipidemia.  Recommend patient follow up in 3 months, sooner if indicated.         This document has been electronically signed by:  BRIAN Arrieta, NP-C

## 2017-08-16 NOTE — ASSESSMENT & PLAN NOTE
Headaches are improving with lifestyle modifications.  Continue current treatment regimen.  Counseled regarding lifestyle modifications.

## 2017-08-16 NOTE — ASSESSMENT & PLAN NOTE
Tobacco use is unchanged.  Smoking cessation counseling was provided.  Pharmacotherapy was prescribed as ordered.  Tobacco use will be reassessed at the next regular appointment.  Smoking cessation counseling was provided for 11 minutes today.  Counseling was provided regarding the health risks associated with tobacco use as well as medications available to assist with smoke cessation.  We have decided to attempt smoke cessation with use of a nicotine patch.  She has been instructed on use and possible side effects.

## 2017-08-16 NOTE — ASSESSMENT & PLAN NOTE
Renal condition is improving with lifestyle modifications.  Continue current treatment regimen.  Weight loss.  Stop smoking.  Continue current medications.  Renal condition will be reassessed in 3 months.

## 2017-08-16 NOTE — ASSESSMENT & PLAN NOTE
Most recent A1c is 6.5  Pt has been educated/instructed on diabetic care and protocols. Sick day rules reviewed. Continue to monitor blood sugar and report abnormal readings as discussed today. Pt / family state understanding.

## 2017-08-21 ENCOUNTER — TELEPHONE (OUTPATIENT)
Dept: FAMILY MEDICINE CLINIC | Facility: CLINIC | Age: 57
End: 2017-08-21

## 2017-11-09 ENCOUNTER — LAB (OUTPATIENT)
Dept: FAMILY MEDICINE CLINIC | Facility: CLINIC | Age: 57
End: 2017-11-09

## 2017-11-09 DIAGNOSIS — E78.2 MIXED HYPERLIPIDEMIA: Primary | ICD-10-CM

## 2017-11-09 DIAGNOSIS — E11.8 TYPE 2 DIABETES MELLITUS WITH COMPLICATION, WITHOUT LONG-TERM CURRENT USE OF INSULIN (HCC): ICD-10-CM

## 2017-11-09 DIAGNOSIS — E55.9 VITAMIN D INSUFFICIENCY: ICD-10-CM

## 2017-11-09 LAB
25(OH)D3 SERPL-MCNC: 14 NG/ML
ALBUMIN SERPL-MCNC: 4.4 G/DL (ref 3.5–5)
ALBUMIN/GLOB SERPL: 1.5 G/DL (ref 1.5–2.5)
ALP SERPL-CCNC: 68 U/L (ref 35–104)
ALT SERPL W P-5'-P-CCNC: 52 U/L (ref 10–36)
ANION GAP SERPL CALCULATED.3IONS-SCNC: 5.3 MMOL/L (ref 3.6–11.2)
AST SERPL-CCNC: 42 U/L (ref 10–30)
BILIRUB SERPL-MCNC: 0.7 MG/DL (ref 0.2–1.8)
BUN BLD-MCNC: 14 MG/DL (ref 7–21)
BUN/CREAT SERPL: 13.7 (ref 7–25)
CALCIUM SPEC-SCNC: 9 MG/DL (ref 7.7–10)
CHLORIDE SERPL-SCNC: 106 MMOL/L (ref 99–112)
CHOLEST SERPL-MCNC: 287 MG/DL (ref 0–200)
CO2 SERPL-SCNC: 24.7 MMOL/L (ref 24.3–31.9)
CREAT BLD-MCNC: 1.02 MG/DL (ref 0.43–1.29)
GFR SERPL CREATININE-BSD FRML MDRD: 56 ML/MIN/1.73
GLOBULIN UR ELPH-MCNC: 3 GM/DL
GLUCOSE BLD-MCNC: 180 MG/DL (ref 70–110)
HBA1C MFR BLD: 8.8 % (ref 4.5–5.7)
HDLC SERPL-MCNC: 33 MG/DL (ref 60–100)
LDLC SERPL CALC-MCNC: ABNORMAL MG/DL (ref 0–100)
LDLC/HDLC SERPL: ABNORMAL {RATIO}
OSMOLALITY SERPL CALC.SUM OF ELEC: 277 MOSM/KG (ref 273–305)
POTASSIUM BLD-SCNC: 4.1 MMOL/L (ref 3.5–5.3)
PROT SERPL-MCNC: 7.4 G/DL (ref 6–8)
SODIUM BLD-SCNC: 136 MMOL/L (ref 135–153)
TRIGL SERPL-MCNC: 942 MG/DL (ref 0–150)
TSH SERPL DL<=0.05 MIU/L-ACNC: >150 MIU/ML (ref 0.55–4.78)
VIT B12 BLD-MCNC: 363 PG/ML (ref 211–911)
VLDLC SERPL-MCNC: ABNORMAL MG/DL

## 2017-11-09 PROCEDURE — 83036 HEMOGLOBIN GLYCOSYLATED A1C: CPT | Performed by: NURSE PRACTITIONER

## 2017-11-09 PROCEDURE — 36415 COLL VENOUS BLD VENIPUNCTURE: CPT

## 2017-11-09 PROCEDURE — 80053 COMPREHEN METABOLIC PANEL: CPT | Performed by: NURSE PRACTITIONER

## 2017-11-09 PROCEDURE — 80061 LIPID PANEL: CPT | Performed by: NURSE PRACTITIONER

## 2017-11-09 PROCEDURE — 82607 VITAMIN B-12: CPT | Performed by: NURSE PRACTITIONER

## 2017-11-09 PROCEDURE — 82306 VITAMIN D 25 HYDROXY: CPT | Performed by: NURSE PRACTITIONER

## 2017-11-09 PROCEDURE — 84443 ASSAY THYROID STIM HORMONE: CPT | Performed by: NURSE PRACTITIONER

## 2017-11-10 ENCOUNTER — TELEPHONE (OUTPATIENT)
Dept: FAMILY MEDICINE CLINIC | Facility: CLINIC | Age: 57
End: 2017-11-10

## 2017-11-10 NOTE — TELEPHONE ENCOUNTER
I have spoke with dougie and she has not been taking her med she said she could not afford her med but she has got it now.

## 2017-11-10 NOTE — TELEPHONE ENCOUNTER
----- Message from BRIAN Parker sent at 11/9/2017  1:16 PM EST -----  Please call the patient and ask her if she has been taking her Lipitor and fenofibrate.  Her total cholesterol and triglycerides have increased significantly over the past 2 months.  Please let me know if she was fasting when these labs were done as well.  If the patient is taking her medication as ordered we will need to set her up with a cardiology consult for an controlled lipidemia.

## 2017-11-10 NOTE — PROGRESS NOTES
Called pt and let her know that she will be referred to a Cardiology Consult. Pt is wanting to be set up. SONIA

## 2017-11-16 ENCOUNTER — OFFICE VISIT (OUTPATIENT)
Dept: FAMILY MEDICINE CLINIC | Facility: CLINIC | Age: 57
End: 2017-11-16

## 2017-11-16 VITALS
SYSTOLIC BLOOD PRESSURE: 110 MMHG | TEMPERATURE: 98.3 F | BODY MASS INDEX: 41.12 KG/M2 | OXYGEN SATURATION: 93 % | HEIGHT: 67 IN | HEART RATE: 91 BPM | WEIGHT: 262 LBS | DIASTOLIC BLOOD PRESSURE: 80 MMHG

## 2017-11-16 DIAGNOSIS — N18.2 TYPE 2 DIABETES MELLITUS WITH STAGE 2 CHRONIC KIDNEY DISEASE, WITHOUT LONG-TERM CURRENT USE OF INSULIN (HCC): ICD-10-CM

## 2017-11-16 DIAGNOSIS — Z72.0 TOBACCO ABUSE: ICD-10-CM

## 2017-11-16 DIAGNOSIS — M54.2 NECK PAIN: ICD-10-CM

## 2017-11-16 DIAGNOSIS — I10 ESSENTIAL HYPERTENSION: ICD-10-CM

## 2017-11-16 DIAGNOSIS — E11.9 TYPE 2 DIABETES MELLITUS WITHOUT COMPLICATION, WITHOUT LONG-TERM CURRENT USE OF INSULIN (HCC): ICD-10-CM

## 2017-11-16 DIAGNOSIS — M75.40 IMPINGEMENT SYNDROME OF SHOULDER REGION, UNSPECIFIED LATERALITY: ICD-10-CM

## 2017-11-16 DIAGNOSIS — K21.9 GASTROESOPHAGEAL REFLUX DISEASE WITHOUT ESOPHAGITIS: ICD-10-CM

## 2017-11-16 DIAGNOSIS — E03.9 HYPOTHYROIDISM, UNSPECIFIED TYPE: ICD-10-CM

## 2017-11-16 DIAGNOSIS — E78.2 MIXED HYPERLIPIDEMIA: ICD-10-CM

## 2017-11-16 DIAGNOSIS — E11.51 TYPE 2 DIABETES MELLITUS WITH DIABETIC PERIPHERAL ANGIOPATHY WITHOUT GANGRENE, WITHOUT LONG-TERM CURRENT USE OF INSULIN (HCC): Primary | ICD-10-CM

## 2017-11-16 DIAGNOSIS — M50.30 DEGENERATION OF INTERVERTEBRAL DISC OF CERVICAL REGION: ICD-10-CM

## 2017-11-16 DIAGNOSIS — E11.22 TYPE 2 DIABETES MELLITUS WITH STAGE 2 CHRONIC KIDNEY DISEASE, WITHOUT LONG-TERM CURRENT USE OF INSULIN (HCC): ICD-10-CM

## 2017-11-16 DIAGNOSIS — M54.40 BILATERAL LOW BACK PAIN WITH SCIATICA, SCIATICA LATERALITY UNSPECIFIED, UNSPECIFIED CHRONICITY: ICD-10-CM

## 2017-11-16 DIAGNOSIS — E55.9 VITAMIN D DEFICIENCY: ICD-10-CM

## 2017-11-16 DIAGNOSIS — E11.49 OTHER DIABETIC NEUROLOGICAL COMPLICATION ASSOCIATED WITH TYPE 2 DIABETES MELLITUS (HCC): ICD-10-CM

## 2017-11-16 DIAGNOSIS — G44.029 CHRONIC CLUSTER HEADACHE, NOT INTRACTABLE: ICD-10-CM

## 2017-11-16 PROBLEM — E11.40 DIABETIC NEUROPATHY ASSOCIATED WITH TYPE 2 DIABETES MELLITUS (HCC): Status: ACTIVE | Noted: 2017-11-16

## 2017-11-16 PROCEDURE — 99215 OFFICE O/P EST HI 40 MIN: CPT | Performed by: NURSE PRACTITIONER

## 2017-11-16 RX ORDER — RANITIDINE 150 MG/1
150 TABLET ORAL NIGHTLY
Qty: 30 TABLET | Refills: 5 | Status: SHIPPED | OUTPATIENT
Start: 2017-11-16 | End: 2018-02-07 | Stop reason: SDUPTHER

## 2017-11-16 RX ORDER — ATORVASTATIN CALCIUM 40 MG/1
40 TABLET, FILM COATED ORAL DAILY
Qty: 30 TABLET | Refills: 5 | Status: SHIPPED | OUTPATIENT
Start: 2017-11-16 | End: 2017-12-15 | Stop reason: ALTCHOICE

## 2017-11-16 RX ORDER — METOPROLOL SUCCINATE 25 MG/1
25 TABLET, EXTENDED RELEASE ORAL DAILY
Qty: 30 TABLET | Refills: 5 | Status: SHIPPED | OUTPATIENT
Start: 2017-11-16 | End: 2018-01-24 | Stop reason: SDUPTHER

## 2017-11-16 RX ORDER — LEVOTHYROXINE SODIUM 150 UG/1
150 CAPSULE ORAL DAILY
Qty: 30 CAPSULE | Refills: 5 | Status: SHIPPED | OUTPATIENT
Start: 2017-11-16 | End: 2018-02-15 | Stop reason: DRUGHIGH

## 2017-11-16 RX ORDER — LORATADINE 10 MG/1
10 TABLET ORAL DAILY PRN
Qty: 30 TABLET | Refills: 5 | Status: SHIPPED | OUTPATIENT
Start: 2017-11-16 | End: 2018-02-15

## 2017-11-16 RX ORDER — ERGOCALCIFEROL 1.25 MG/1
50000 CAPSULE ORAL WEEKLY
Qty: 4 CAPSULE | Refills: 5 | Status: SHIPPED | OUTPATIENT
Start: 2017-11-16 | End: 2018-02-15 | Stop reason: SDUPTHER

## 2017-11-16 RX ORDER — PROMETHAZINE HYDROCHLORIDE 25 MG/1
25 TABLET ORAL EVERY 8 HOURS PRN
Qty: 20 TABLET | Refills: 5 | Status: SHIPPED | OUTPATIENT
Start: 2017-11-16 | End: 2018-02-15 | Stop reason: SDUPTHER

## 2017-11-16 RX ORDER — LANCETS 28 GAUGE
EACH MISCELLANEOUS
Qty: 100 EACH | Refills: 12 | Status: SHIPPED | OUTPATIENT
Start: 2017-11-16 | End: 2018-05-16 | Stop reason: SDUPTHER

## 2017-11-16 RX ORDER — CYCLOBENZAPRINE HCL 5 MG
5 TABLET ORAL 2 TIMES DAILY PRN
Qty: 30 TABLET | Refills: 5 | Status: SHIPPED | OUTPATIENT
Start: 2017-11-16 | End: 2018-02-15

## 2017-11-16 RX ORDER — GEMFIBROZIL 600 MG/1
600 TABLET, FILM COATED ORAL
Qty: 60 TABLET | Refills: 5 | Status: SHIPPED | OUTPATIENT
Start: 2017-11-16 | End: 2018-01-24

## 2017-11-16 NOTE — PROGRESS NOTES
Subjective   Donna Israel is a 57 y.o. female.     Chief Complaint   Patient presents with   • Diabetes   • Hyperlipidemia   • Hypertension   • Hypothyroidism       History of Present Illness     Hypothyroidism - pt states that she does not take her synthroid daily and went several weeks with out taking her synthroid. Current dose of Synthroid is 150 µg a daily.  Unable to evaluate effectiveness due to noncompliance.    Vit d def - not compliant with supplement    DM uncontrolled with renal complications and diabetic neuropathy - pt is not  Compliant with diet. She has been seen by nephrology. Pt has not been taking her diabetic medications as prescribed. Has been seen by podiatry recently.  Does do foot exams.  She does not routinely check her glucose level.  She denies any symptomatic hypoglycemia or hypoglycemic episodes.    Uncontrolled hyperlipidemia - pt states that she has not been taking any cholesterol medication.   She does not watch her diet.     GERD-patient does state that her current medication controls her GERD symptoms.  She denies any side effects.    Chronic low back and neck pain-patient states that she occasionally takes Tylenol over-the-counter for this.  She no longer takes any anti-inflammatory medication due to decreased kidney function.    Decreased renal function-patient has been evaluated by nephrology.  She has stopped all antiplatelet medication.  Patient admits that she is not limiting the sodium in her diet.    Tobacco abuse-patient smokes one pack every 1-2 days.  She states she does want to  stop smoking but that would just make her eat more.    History is significant for family history of cardiovascular disease and early death due to CVD.       Chronic cluster headaches-patient states that when she has a real bad headaches takes Tylenol and Phenergan and that helps her headache.  Headaches are less often than weekly.  She does find that she has more headaches when she eats  "sweets.        The following portions of the patient's history were reviewed and updated as appropriate: allergies, current medications, past family history, past medical history, past social history, past surgical history and problem list.    Review of Systems   Constitutional: Negative for appetite change, chills, fatigue, fever and unexpected weight change.   HENT: Negative.    Eyes: Negative.  Negative for visual disturbance.   Respiratory: Positive for shortness of breath (with exertions such as stairs ). Negative for cough, chest tightness and wheezing.    Cardiovascular: Positive for leg swelling (feet swell with increased sodium intake or prolonged sitting with feet down ). Negative for chest pain and palpitations.   Gastrointestinal: Positive for nausea (with headaches ). Negative for abdominal pain, blood in stool, constipation, diarrhea and vomiting.   Endocrine: Negative.    Genitourinary: Negative for dysuria and flank pain.   Musculoskeletal: Positive for arthralgias, back pain, gait problem, joint swelling, myalgias and neck pain. Negative for neck stiffness.   Skin: Negative for rash.   Allergic/Immunologic: Negative.    Neurological: Positive for numbness (feet due to neuropathy ).   Hematological: Negative.    Psychiatric/Behavioral: Negative for agitation, behavioral problems, dysphoric mood and suicidal ideas. The patient is not nervous/anxious.    All other systems reviewed and are negative.      Objective     /80 (BP Location: Left arm, Patient Position: Sitting, Cuff Size: Adult)  Pulse 91  Temp 98.3 °F (36.8 °C) (Tympanic)   Ht 67\" (170.2 cm)  Wt 262 lb (119 kg)  SpO2 93%  BMI 41.04 kg/m2  Lab on 11/09/2017   Component Date Value Ref Range Status   • Glucose 11/09/2017 180* 70 - 110 mg/dL Final   • BUN 11/09/2017 14  7 - 21 mg/dL Final   • Creatinine 11/09/2017 1.02  0.43 - 1.29 mg/dL Final   • Sodium 11/09/2017 136  135 - 153 mmol/L Final   • Potassium 11/09/2017 4.1  3.5 - 5.3 " mmol/L Final   • Chloride 11/09/2017 106  99 - 112 mmol/L Final   • CO2 11/09/2017 24.7  24.3 - 31.9 mmol/L Final   • Calcium 11/09/2017 9.0  7.7 - 10.0 mg/dL Final   • Total Protein 11/09/2017 7.4  6.0 - 8.0 g/dL Final   • Albumin 11/09/2017 4.40  3.50 - 5.00 g/dL Final   • ALT (SGPT) 11/09/2017 52* 10 - 36 U/L Final   • AST (SGOT) 11/09/2017 42* 10 - 30 U/L Final   • Alkaline Phosphatase 11/09/2017 68  35 - 104 U/L Final   • Total Bilirubin 11/09/2017 0.7  0.2 - 1.8 mg/dL Final   • eGFR Non African Amer 11/09/2017 56* >60 mL/min/1.73 Final   • Globulin 11/09/2017 3.0  gm/dL Final   • A/G Ratio 11/09/2017 1.5  1.5 - 2.5 g/dL Final   • BUN/Creatinine Ratio 11/09/2017 13.7  7.0 - 25.0 Final   • Anion Gap 11/09/2017 5.3  3.6 - 11.2 mmol/L Final   • Hemoglobin A1C 11/09/2017 8.80* 4.50 - 5.70 % Final   • Total Cholesterol 11/09/2017 287* 0 - 200 mg/dL Final   • Triglycerides 11/09/2017 942* 0 - 150 mg/dL Final   • HDL Cholesterol 11/09/2017 33* 60 - 100 mg/dL Final   • LDL Cholesterol  11/09/2017   0 - 100 mg/dL Final   • VLDL Cholesterol 11/09/2017   mg/dL Final   • LDL/HDL Ratio 11/09/2017    Final   • 25 Hydroxy, Vitamin D 11/09/2017 14.0  ng/ml Final   • Vitamin B-12 11/09/2017 363  211 - 911 pg/mL Final   • TSH 11/09/2017 >150.000* 0.550 - 4.780 mIU/mL Final   • Osmolality Calc 11/09/2017 277.0  273.0 - 305.0 mOsm/kg Final       Physical Exam   Constitutional: She is oriented to person, place, and time. Vital signs are normal. She appears well-developed and well-nourished. No distress.   Visibly obese.    HENT:   Head: Normocephalic and atraumatic.   Right Ear: External ear normal.   Left Ear: External ear normal.   Nose: Nose normal.   Mouth/Throat: Oropharynx is clear and moist.   Eyes: EOM are normal. Pupils are equal, round, and reactive to light.   Neck: Normal range of motion. Neck supple. No tracheal deviation present. No thyromegaly present.   Cardiovascular: Normal rate, regular rhythm, normal heart  sounds and intact distal pulses.  Exam reveals no gallop and no friction rub.    No murmur heard.  Pulmonary/Chest: Effort normal and breath sounds normal. No respiratory distress. She has no wheezes. She has no rales. She exhibits no tenderness.   Abdominal: Soft. Bowel sounds are normal. She exhibits no distension and no mass. There is no tenderness. There is no rebound and no guarding.   Musculoskeletal: Normal range of motion.    Donna had a diabetic foot exam performed today.    Vascular Status -  Her exam exhibits right foot vasculature normal. Her exam exhibits left foot vasculature normal.  Lymphadenopathy:     She has no cervical adenopathy.   Neurological: She is alert and oriented to person, place, and time. She has normal reflexes. A sensory deficit (decreased sensation on both feet ) is present.   CN 2-12 grossly intact    Skin: Skin is warm and dry. No rash noted. She is not diaphoretic. No erythema.   Psychiatric: She has a normal mood and affect. Her speech is normal and behavior is normal. Judgment and thought content normal. Cognition and memory are normal.       Assessment/Plan     Problem List Items Addressed This Visit        Cardiovascular and Mediastinum    Type 2 diabetes mellitus with circulatory disorder, without long-term current use of insulin - Primary    Relevant Medications    SITagliptin (JANUVIA) 100 MG tablet    metFORMIN (GLUCOPHAGE) 500 MG tablet    Insulin Glargine (LANTUS SOLOSTAR) 100 UNIT/ML injection pen    Other Relevant Orders    Ambulatory Referral to Cardiology    CBC & Differential    Comprehensive Metabolic Panel    TSH    Hemoglobin A1c    Vitamin D 25 Hydroxy    Lipid Panel    MicroAlbumin, Urine, Random - Urine, Clean Catch    Hyperlipidemia    Relevant Medications    gemfibrozil (LOPID) 600 MG tablet    atorvastatin (LIPITOR) 40 MG tablet    Other Relevant Orders    Ambulatory Referral to Cardiology    Lipid Panel    Essential hypertension    Relevant Medications     metoprolol succinate XL (TOPROL XL) 25 MG 24 hr tablet    Other Relevant Orders    CBC & Differential    Comprehensive Metabolic Panel    MicroAlbumin, Urine, Random - Urine, Clean Catch       Digestive    Gastroesophageal reflux disease    Relevant Medications    raNITIdine (ZANTAC) 150 MG tablet    Vitamin D deficiency    Relevant Orders    Vitamin D 25 Hydroxy       Endocrine    Hypothyroidism    Relevant Medications    levothyroxine sodium (TIROSINT) 150 MCG capsule    metoprolol succinate XL (TOPROL XL) 25 MG 24 hr tablet    Type 2 diabetes mellitus with stage 2 chronic kidney disease, without long-term current use of insulin    Relevant Medications    SITagliptin (JANUVIA) 100 MG tablet    metFORMIN (GLUCOPHAGE) 500 MG tablet    Insulin Glargine (LANTUS SOLOSTAR) 100 UNIT/ML injection pen    Diabetic neuropathy associated with type 2 diabetes mellitus    Relevant Medications    SITagliptin (JANUVIA) 100 MG tablet    metFORMIN (GLUCOPHAGE) 500 MG tablet    Insulin Glargine (LANTUS SOLOSTAR) 100 UNIT/ML injection pen       Nervous and Auditory    Neck pain    Low back pain    Chronic cluster headache, not intractable    Relevant Medications    cyclobenzaprine (FLEXERIL) 5 MG tablet    metoprolol succinate XL (TOPROL XL) 25 MG 24 hr tablet       Musculoskeletal and Integument    Degeneration of intervertebral disc of cervical region       Other    Tobacco abuse    Impingement syndrome, shoulder      Other Visit Diagnoses     Type 2 diabetes mellitus without complication, without long-term current use of insulin        Relevant Medications    SITagliptin (JANUVIA) 100 MG tablet    metFORMIN (GLUCOPHAGE) 500 MG tablet    Insulin Glargine (LANTUS SOLOSTAR) 100 UNIT/ML injection pen          Current Outpatient Prescriptions:   •  atorvastatin (LIPITOR) 40 MG tablet, Take 1 tablet by mouth Daily., Disp: 30 tablet, Rfl: 5  •  Blood Glucose Monitoring Suppl kit, 1 kit 2 (Two) Times a Day., Disp: 1 each, Rfl: 0  •   cyclobenzaprine (FLEXERIL) 5 MG tablet, Take 1 tablet by mouth 2 (Two) Times a Day As Needed for Muscle Spasms., Disp: 30 tablet, Rfl: 5  •  gemfibrozil (LOPID) 600 MG tablet, Take 1 tablet by mouth 2 (Two) Times a Day Before Meals., Disp: 60 tablet, Rfl: 5  •  glucose blood (COOL BLOOD GLUCOSE TEST STRIPS) test strip, Use as instructed, Disp: 100 each, Rfl: 12  •  Lancets (FREESTYLE) lancets, Use as instructed, Disp: 100 each, Rfl: 12  •  levothyroxine sodium (TIROSINT) 150 MCG capsule, Take 1 capsule by mouth Daily., Disp: 30 capsule, Rfl: 5  •  loratadine (CLARITIN) 10 MG tablet, Take 1 tablet by mouth Daily As Needed for Allergies., Disp: 30 tablet, Rfl: 5  •  metFORMIN (GLUCOPHAGE) 500 MG tablet, Take 1 tablet by mouth 2 (Two) Times a Day With Meals., Disp: 60 tablet, Rfl: 3  •  nicotine (NICODERM CQ) 14 MG/24HR patch, Place 1 patch on the skin Daily., Disp: 14 patch, Rfl: 0  •  nicotine (NICODERM CQ) 21 MG/24HR patch, Place 1 patch on the skin Daily., Disp: 28 patch, Rfl: 1  •  nicotine (NICODERM CQ) 7 MG/24HR patch, Place 1 patch on the skin Daily., Disp: 14 patch, Rfl: 0  •  promethazine (PHENERGAN) 25 MG tablet, Take 1 tablet by mouth Every 8 (Eight) Hours As Needed for Nausea or Vomiting., Disp: 20 tablet, Rfl: 5  •  raNITIdine (ZANTAC) 150 MG tablet, Take 1 tablet by mouth Every Night., Disp: 30 tablet, Rfl: 5  •  SITagliptin (JANUVIA) 100 MG tablet, Take 1 tablet by mouth Daily., Disp: 30 tablet, Rfl: 5  •  vitamin D (ERGOCALCIFEROL) 32898 units capsule capsule, Take 1 capsule by mouth 1 (One) Time Per Week., Disp: 4 capsule, Rfl: 5  •  Insulin Glargine (LANTUS SOLOSTAR) 100 UNIT/ML injection pen, Inject 10 Units under the skin Every Night., Disp: 3 pen, Rfl: 5  •  Insulin Pen Needle (BD PEN NEEDLE BROOKE U/F) 32G X 4 MM misc, 1 Device Daily., Disp: 30 each, Rfl: 5  •  metoprolol succinate XL (TOPROL XL) 25 MG 24 hr tablet, Take 1 tablet by mouth Daily., Disp: 30 tablet, Rfl: 5    Add Toprol 25 mg XL daily  as recommended by nephrology.  Stop lisinopril.  Medication list reviewed and discussed.  Recent labs have been reviewed and discussed with medication changes.  Compliance has been discussed.  I have discussed with patient risk factors associated with noncompliance with diabetes, hypothyroidism and hyperlipidemic medications including but not limited to disability, stroke and death.  Refer to cardiology for evaluation due to chronic uncontrolled hyperlipidemia.  Remain under the care of podiatry. Check glucose 3-4 times daily and report abnormal levels as discussed today.  Pt has been educated/instructed on diabetic care and protocols. Sick day rules reviewed. Continue to monitor blood sugar and report abnormal readings as discussed today. Pt / family state understanding.   Has been provided to the patient on insulin administration and precautions.  Start Lantus 10 units daily.  Demonstration has been provided and patient has provided a return demonstration.  I have discussed diagnosis in detail today allowing time for questions and answers. Pt is aware of reasons to seek urgent or emergent medical care as well as reasons to return to the clinic for evaluation. Possible side effects, interactions and progression of symptoms discussed as well. Pt / family states understanding.   Follow up 3 months, sooner if needed.   Time face-to-face with patient today 44 minutes with 50% of this time spent counseling regarding compliance issues, weight management, diabetic education, insulin administration demonstration and education, possible side effects to medications as well as interactions.  Routine labs every 3-6 months.          This document has been electronically signed by:  BRIAN Arrieta, NP-C

## 2017-11-20 ENCOUNTER — TELEPHONE (OUTPATIENT)
Dept: FAMILY MEDICINE CLINIC | Facility: CLINIC | Age: 57
End: 2017-11-20

## 2017-11-20 NOTE — TELEPHONE ENCOUNTER
----- Message from Eder Dalton MA sent at 11/20/2017 10:11 AM EST -----  Regarding: Pharmacy Called about Drug interaction  Pharmacy called about patient taking Gemfibrozil and Atorvastatin. Please advise if there is going to be a change.  Thanks  Eder CASTELLANO      Please notify the patient and the pharmacy that she may stop the Lipitor but she needs to continue the Lopid as her triglycerides are greater than 500.  Please advise patient that she will need to keep her cardiology consult and remind her of a heart healthy diet.

## 2017-12-07 ENCOUNTER — TELEPHONE (OUTPATIENT)
Dept: FAMILY MEDICINE CLINIC | Facility: CLINIC | Age: 57
End: 2017-12-07

## 2017-12-10 NOTE — TELEPHONE ENCOUNTER
Call and see if she still has the area and set her up with dr. Mayes or one of the [facility]  General surgeons this week. She will need to start antibiotics, start keflex 500mg bid x 10 days if not allergic. If allergic please see me.

## 2017-12-11 ENCOUNTER — TELEPHONE (OUTPATIENT)
Dept: FAMILY MEDICINE CLINIC | Facility: CLINIC | Age: 57
End: 2017-12-11

## 2017-12-11 NOTE — TELEPHONE ENCOUNTER
I called in med for dougie and she has went to emergency room I have called for records she does not want to see a surgeon at this time she is going to take med and use hot towel

## 2017-12-15 ENCOUNTER — OFFICE VISIT (OUTPATIENT)
Dept: CARDIOLOGY | Facility: CLINIC | Age: 57
End: 2017-12-15

## 2017-12-15 VITALS
HEART RATE: 83 BPM | SYSTOLIC BLOOD PRESSURE: 117 MMHG | DIASTOLIC BLOOD PRESSURE: 70 MMHG | RESPIRATION RATE: 16 BRPM | WEIGHT: 254.8 LBS | HEIGHT: 67 IN | BODY MASS INDEX: 39.99 KG/M2

## 2017-12-15 DIAGNOSIS — M54.40 BILATERAL LOW BACK PAIN WITH SCIATICA, SCIATICA LATERALITY UNSPECIFIED, UNSPECIFIED CHRONICITY: ICD-10-CM

## 2017-12-15 DIAGNOSIS — E78.2 MIXED HYPERLIPIDEMIA: ICD-10-CM

## 2017-12-15 DIAGNOSIS — I25.2 OLD ANTERIOR MYOCARDIAL INFARCTION: ICD-10-CM

## 2017-12-15 DIAGNOSIS — I10 ESSENTIAL HYPERTENSION: ICD-10-CM

## 2017-12-15 DIAGNOSIS — E11.51 TYPE 2 DIABETES MELLITUS WITH DIABETIC PERIPHERAL ANGIOPATHY WITHOUT GANGRENE, WITHOUT LONG-TERM CURRENT USE OF INSULIN (HCC): ICD-10-CM

## 2017-12-15 DIAGNOSIS — E03.9 HYPOTHYROIDISM, UNSPECIFIED TYPE: ICD-10-CM

## 2017-12-15 DIAGNOSIS — R07.2 PRECORDIAL PAIN: ICD-10-CM

## 2017-12-15 DIAGNOSIS — R00.2 PALPITATIONS: Primary | ICD-10-CM

## 2017-12-15 PROBLEM — R94.31 ABNORMAL EKG: Status: ACTIVE | Noted: 2017-12-15

## 2017-12-15 PROCEDURE — 93000 ELECTROCARDIOGRAM COMPLETE: CPT | Performed by: INTERNAL MEDICINE

## 2017-12-15 PROCEDURE — 99204 OFFICE O/P NEW MOD 45 MIN: CPT | Performed by: INTERNAL MEDICINE

## 2017-12-15 RX ORDER — CLOPIDOGREL BISULFATE 75 MG/1
75 TABLET ORAL DAILY
Qty: 30 TABLET | Refills: 3 | Status: SHIPPED | OUTPATIENT
Start: 2017-12-15 | End: 2018-08-20

## 2017-12-15 NOTE — PROGRESS NOTES
Alyse Earl, BRIAN  Donna Israel  1960  12/15/2017    Patient Active Problem List   Diagnosis   • Gastroesophageal reflux disease   • Neck pain   • Cough   • Degeneration of intervertebral disc of cervical region   • Type 2 diabetes mellitus with circulatory disorder, with long-term current use of insulin   • Hyperlipidemia   • Hypothyroidism   • Memory loss   • Low back pain   • Tobacco abuse   • Adiposity   • Disorder of rotator cuff   • Shoulder pain   • Vitamin D deficiency   • Tear of right rotator cuff   • Impingement syndrome, shoulder   • Chronic cluster headache, not intractable   • Type 2 diabetes mellitus with stage 2 chronic kidney disease, without long-term current use of insulin   • Diabetic neuropathy associated with type 2 diabetes mellitus   • Essential hypertension   • Palpitations   • Abnormal EKG   • Old anterior myocardial infarction       Dear Alyse Earl, BRIAN:    Subjective     Donna Israel is a 57 y.o. female with the problems as listed above, presents    History of Present Illness:Ms. Cordero is a pleasant 57 year old  female with the history of long-standing diabetes mellitus, hypertension, history of smoking and a positive family history of premature coronary artery disease, has been referred by her PCP for cardiac evaluation.  She complains of intermittent palpitations with rapid heart beat associated with some dizziness but no syncope.  She denies any chest pain or discomfort.She has dyspnea with mild-to-moderate exertion with no PND, orthopnea or pedal edema.  She has no history of known coronary artery disease.  He has some intermittent chest pain or discomfort which is of mild intensity results spontaneously.    Cardiac risk factors:diabetes mellitus, hypercholesterolemia, hypertension, smoking, Positive family Hx. of premature athersclerotivc disease., Obesity and Age and postmenopausal status.    No Known Allergies:      Current  Outpatient Prescriptions:   •  Blood Glucose Monitoring Suppl kit, 1 kit 2 (Two) Times a Day., Disp: 1 each, Rfl: 0  •  cephalexin (KEFLEX) 500 MG capsule, Take 500 mg by mouth As Needed., Disp: , Rfl:   •  cyclobenzaprine (FLEXERIL) 5 MG tablet, Take 1 tablet by mouth 2 (Two) Times a Day As Needed for Muscle Spasms., Disp: 30 tablet, Rfl: 5  •  gemfibrozil (LOPID) 600 MG tablet, Take 1 tablet by mouth 2 (Two) Times a Day Before Meals., Disp: 60 tablet, Rfl: 5  •  glucose blood (COOL BLOOD GLUCOSE TEST STRIPS) test strip, Use as instructed, Disp: 100 each, Rfl: 12  •  insulin degludec (TRESIBA FLEXTOUCH) 100 UNIT/ML solution pen-injector injection, Inject 10 Units under the skin Every Night., Disp: 2 pen, Rfl: 5  •  Insulin Pen Needle (BD PEN NEEDLE BROOKE U/F) 32G X 4 MM misc, 1 Device Daily., Disp: 30 each, Rfl: 5  •  Lancets (FREESTYLE) lancets, Use as instructed, Disp: 100 each, Rfl: 12  •  levothyroxine sodium (TIROSINT) 150 MCG capsule, Take 1 capsule by mouth Daily., Disp: 30 capsule, Rfl: 5  •  metFORMIN (GLUCOPHAGE) 500 MG tablet, Take 1 tablet by mouth 2 (Two) Times a Day With Meals., Disp: 60 tablet, Rfl: 3  •  metoprolol succinate XL (TOPROL XL) 25 MG 24 hr tablet, Take 1 tablet by mouth Daily., Disp: 30 tablet, Rfl: 5  •  promethazine (PHENERGAN) 25 MG tablet, Take 1 tablet by mouth Every 8 (Eight) Hours As Needed for Nausea or Vomiting., Disp: 20 tablet, Rfl: 5  •  raNITIdine (ZANTAC) 150 MG tablet, Take 1 tablet by mouth Every Night., Disp: 30 tablet, Rfl: 5  •  SITagliptin (JANUVIA) 100 MG tablet, Take 1 tablet by mouth Daily., Disp: 30 tablet, Rfl: 5  •  vitamin D (ERGOCALCIFEROL) 17339 units capsule capsule, Take 1 capsule by mouth 1 (One) Time Per Week., Disp: 4 capsule, Rfl: 5  •  clopidogrel (PLAVIX) 75 MG tablet, Take 1 tablet by mouth Daily., Disp: 30 tablet, Rfl: 3  •  loratadine (CLARITIN) 10 MG tablet, Take 1 tablet by mouth Daily As Needed for Allergies., Disp: 30 tablet, Rfl: 5    Past  Medical History:   Diagnosis Date   • Arthritis     osteo   • Edema    • GERD (gastroesophageal reflux disease)    • Hyperlipidemia    • Hypertension    • Hypothyroidism    • Inj musc/tend the rotator cuff of right shoulder, subs    • Lumbago    • Memory loss    • Obesity    • Osteopenia    • Pain in right shoulder    • Stiffness of right shoulder joint    • Tobacco use disorder    • Type 2 diabetes mellitus    • Vitamin D deficiency      Past Surgical History:   Procedure Laterality Date   • ROTATOR CUFF REPAIR Right    • TUBAL ABDOMINAL LIGATION       Family History   Problem Relation Age of Onset   • Arthritis Mother    • COPD Mother    • Hyperlipidemia Mother    • Hypertension Mother    • Thyroid disease Mother    • Alcohol abuse Father    • Arthritis Father    • Cancer Father    • Diabetes Father    • Hyperlipidemia Father    • Arthritis Sister    • COPD Sister    • Diabetes Sister    • Heart disease(Her older sister has had 3 small heart attacks).   Sister    • Hyperlipidemia Sister    • Hypertension Sister    • Thyroid disease Sister    • Diabetes Brother    • Hyperlipidemia Brother    • Hypertension Brother    • Heart disease Maternal Grandmother    • Arthritis Sister    • Diabetes Sister    • Hyperlipidemia Sister    • Thyroid disease Sister    • Thyroid disease Sister    Her older sister has had 3 small heart attacks.  She is only 18 months older than her.  Social History   Substance Use Topics   • Smoking status: Current Every Day Smoker     Types: Cigarettes   • Smokeless tobacco: Never Used   • Alcohol use No       Review of Systems   Constitution: Positive for malaise/fatigue. Negative for chills, diaphoresis, fever, weakness, weight gain and weight loss.   HENT: Negative for congestion, ear discharge, ear pain, hearing loss, hoarse voice, nosebleeds, sore throat and tinnitus.    Eyes: Negative for blurred vision, discharge, double vision and pain.   Cardiovascular: Positive for leg swelling and  "palpitations. Negative for chest pain, dyspnea on exertion, irregular heartbeat and orthopnea.   Respiratory: Positive for shortness of breath. Negative for cough, hemoptysis and wheezing.    Endocrine: Negative for cold intolerance, heat intolerance, polydipsia, polyphagia and polyuria.   Hematologic/Lymphatic: Negative for bleeding problem. Does not bruise/bleed easily.   Skin: Negative for color change, dry skin, flushing, itching and rash.   Musculoskeletal: Positive for back pain, joint pain, joint swelling, muscle cramps, muscle weakness and stiffness. Negative for arthritis and neck pain.   Gastrointestinal: Negative for abdominal pain, change in bowel habit, constipation, diarrhea, heartburn, hematemesis, melena, nausea and vomiting.   Genitourinary: Negative for bladder incontinence, decreased libido, dysuria, frequency and hematuria.   Neurological: Positive for headaches and numbness. Negative for disturbances in coordination, dizziness, focal weakness, light-headedness, loss of balance, paresthesias and tremors.   Psychiatric/Behavioral: Negative for altered mental status, depression and memory loss. The patient does not have insomnia.    Allergic/Immunologic: Negative for hives.       Objective   Blood pressure 117/70, pulse 83, resp. rate 16, height 170.2 cm (67.01\"), weight 116 kg (254 lb 12.8 oz).  Body mass index is 39.9 kg/(m^2).        Physical Exam   Constitutional: She is oriented to person, place, and time. She appears well-developed and well-nourished.   HENT:   Head: Normocephalic.   Eyes: Conjunctivae and EOM are normal.   Neck: Normal range of motion. Neck supple. No JVD present. No tracheal deviation present. No thyromegaly present.   Cardiovascular: Normal rate, regular rhythm, S1 normal and S2 normal.  Exam reveals no gallop, no S3, no S4 and no friction rub.    No murmur heard.  Pulmonary/Chest: Breath sounds normal. No respiratory distress. She has no wheezes. She has no rales. "   Abdominal: Soft. Bowel sounds are normal. She exhibits no mass. There is no tenderness.   Musculoskeletal: She exhibits no edema.   Neurological: She is alert and oriented to person, place, and time. No cranial nerve deficit.   Skin: Skin is warm and dry.   Psychiatric: She has a normal mood and affect.       Lab Results   Component Value Date     11/09/2017    K 4.1 11/09/2017     11/09/2017    CO2 24.7 11/09/2017    BUN 14 11/09/2017    CREATININE 1.02 11/09/2017    GLUCOSE 180 (H) 11/09/2017    CALCIUM 9.0 11/09/2017    AST 42 (H) 11/09/2017    ALT 52 (H) 11/09/2017    ALKPHOS 68 11/09/2017    LABIL2 1.5 11/09/2017     No results found for: CKTOTAL  Lab Results   Component Value Date    WBC 7.89 08/08/2017    HGB 14.2 08/08/2017    HCT 43.4 08/08/2017     08/08/2017     No results found for: INR  No results found for: MG  Lab Results   Component Value Date    TSH >150.000 (H) 11/09/2017    CHLPL 196 03/18/2016    TRIG 942 (H) 11/09/2017    HDL 33 (L) 11/09/2017    LDL No Calculation 03/18/2016      No results found for: BNP      ECG 12 Lead  Date/Time: 12/15/2017 9:39 AM  Performed by: RAJ GLASGOW  Authorized by: RAJ GLASGOW   Rhythm: sinus rhythm  Conduction: conduction normal  ST Segments: ST segments normal  Comments: Poor R wave progression across leads V1 through V6 with possible old anteroseptal myocardial infarction.            Assessment/Plan   Diagnoses and all orders for this visit:    1. Precordial pain  2. Palpitations  3. Old anterior myocardial infarction  4. Essential hypertension  5. Mixed hyperlipidemia  6. Type 2 diabetes mellitus with diabetic peripheral angiopathy without gangrene, without long-term current use of insulin  7. Hypothyroidism, unspecified type  8. Bilateral low back pain with sciatica, sciatica laterality unspecified, unspecified chronicity       Recommendations:    1. We'll start Plavix 75 mg daily since patient has history of gastric ulcers with GI  bleeding in the past.  2. Continue with Toprol-XL at the current dose.  3. She has some abnormal LFTs with elevated AST and ALT.  4. She reportedly told by Dr. Alfaro that a statin could damage her kidneys. With this along with mildly elevated AST and ALT, we will hold off on the usage of statin at this time.  We will hopefully be able to use this when her LFTs improve adequately.  i'm not sure if statins have a significant detrimental effect on the kidney function.  5. We will evaluate further with a Lexiscan sestamibi study and echo Doppler study as well as Holter monitor.    Return in about 4 weeks (around 1/12/2018).    As always, I appreciate very much the opportunity to participate in the cardiovascular care of your patients.      With Best Regards,    Kishor Anders MD, [facility]    Dragon disclaimer:  Much of this encounter note is an electronic transcription/translation of spoken language to printed text. The electronic translation of spoken language may permit erroneous, or at times, nonsensical words or phrases to be inadvertently transcribed; Although I have reviewed the note for such errors, some may still exist.

## 2018-01-02 ENCOUNTER — HOSPITAL ENCOUNTER (OUTPATIENT)
Dept: NUCLEAR MEDICINE | Facility: HOSPITAL | Age: 58
Discharge: HOME | End: 2018-01-02
Attending: INTERNAL MEDICINE

## 2018-01-02 ENCOUNTER — HOSPITAL ENCOUNTER (OUTPATIENT)
Dept: CARDIOLOGY | Facility: HOSPITAL | Age: 58
Discharge: HOME | End: 2018-01-02
Attending: INTERNAL MEDICINE

## 2018-01-02 ENCOUNTER — HOSPITAL ENCOUNTER (OUTPATIENT)
Dept: RESPIRATORY THERAPY | Facility: HOSPITAL | Age: 58
Discharge: HOME | End: 2018-01-02
Attending: INTERNAL MEDICINE | Admitting: INTERNAL MEDICINE

## 2018-01-02 DIAGNOSIS — R07.2 PRECORDIAL PAIN: ICD-10-CM

## 2018-01-02 DIAGNOSIS — R00.2 PALPITATIONS: ICD-10-CM

## 2018-01-02 LAB
BH CV ECHO MEAS - % IVS THICK: 31.9 %
BH CV ECHO MEAS - % LVPW THICK: 58.9 %
BH CV ECHO MEAS - ACS: 1.7 CM
BH CV ECHO MEAS - AO MAX PG: 8.4 MMHG
BH CV ECHO MEAS - AO MEAN PG: 4.3 MMHG
BH CV ECHO MEAS - AO ROOT AREA (BSA CORRECTED): 1.4
BH CV ECHO MEAS - AO ROOT AREA: 7.7 CM^2
BH CV ECHO MEAS - AO ROOT DIAM: 3.1 CM
BH CV ECHO MEAS - AO V2 MAX: 145.2 CM/SEC
BH CV ECHO MEAS - AO V2 MEAN: 94.4 CM/SEC
BH CV ECHO MEAS - AO V2 VTI: 29.3 CM
BH CV ECHO MEAS - BSA(HAYCOCK): 2.4 M^2
BH CV ECHO MEAS - BSA: 2.2 M^2
BH CV ECHO MEAS - BZI_BMI: 39.8 KILOGRAMS/M^2
BH CV ECHO MEAS - BZI_METRIC_HEIGHT: 170.2 CM
BH CV ECHO MEAS - BZI_METRIC_WEIGHT: 115.2 KG
BH CV ECHO MEAS - CONTRAST EF 4CH: 67.8 ML/M^2
BH CV ECHO MEAS - EDV(CUBED): 65.7 ML
BH CV ECHO MEAS - EDV(MOD-SP4): 59 ML
BH CV ECHO MEAS - EDV(TEICH): 71.4 ML
BH CV ECHO MEAS - EF(CUBED): 53.3 %
BH CV ECHO MEAS - EF(MOD-SP4): 67.8 %
BH CV ECHO MEAS - EF(TEICH): 45.7 %
BH CV ECHO MEAS - ESV(CUBED): 30.6 ML
BH CV ECHO MEAS - ESV(MOD-SP4): 19 ML
BH CV ECHO MEAS - ESV(TEICH): 38.8 ML
BH CV ECHO MEAS - FS: 22.4 %
BH CV ECHO MEAS - IVS/LVPW: 1.2
BH CV ECHO MEAS - IVSD: 1.2 CM
BH CV ECHO MEAS - IVSS: 1.5 CM
BH CV ECHO MEAS - LA DIMENSION: 2.4 CM
BH CV ECHO MEAS - LA/AO: 0.76
BH CV ECHO MEAS - LV DIASTOLIC VOL/BSA (35-75): 26.4 ML/M^2
BH CV ECHO MEAS - LV MASS(C)D: 136.7 GRAMS
BH CV ECHO MEAS - LV MASS(C)DI: 61.1 GRAMS/M^2
BH CV ECHO MEAS - LV MASS(C)S: 166.9 GRAMS
BH CV ECHO MEAS - LV MASS(C)SI: 74.6 GRAMS/M^2
BH CV ECHO MEAS - LV SYSTOLIC VOL/BSA (12-30): 8.5 ML/M^2
BH CV ECHO MEAS - LVIDD: 4 CM
BH CV ECHO MEAS - LVIDS: 3.1 CM
BH CV ECHO MEAS - LVLD AP4: 6.9 CM
BH CV ECHO MEAS - LVLS AP4: 6.8 CM
BH CV ECHO MEAS - LVOT AREA (M): 3.1 CM^2
BH CV ECHO MEAS - LVOT AREA: 3.1 CM^2
BH CV ECHO MEAS - LVOT DIAM: 2 CM
BH CV ECHO MEAS - LVPWD: 0.93 CM
BH CV ECHO MEAS - LVPWS: 1.5 CM
BH CV ECHO MEAS - MV A MAX VEL: 104.1 CM/SEC
BH CV ECHO MEAS - MV E MAX VEL: 83.9 CM/SEC
BH CV ECHO MEAS - MV E/A: 0.81
BH CV ECHO MEAS - PA ACC SLOPE: 2998 CM/SEC^2
BH CV ECHO MEAS - PA ACC TIME: 0.03 SEC
BH CV ECHO MEAS - PA PR(ACCEL): 64.7 MMHG
BH CV ECHO MEAS - SI(AO): 101 ML/M^2
BH CV ECHO MEAS - SI(CUBED): 15.6 ML/M^2
BH CV ECHO MEAS - SI(MOD-SP4): 17.9 ML/M^2
BH CV ECHO MEAS - SI(TEICH): 14.6 ML/M^2
BH CV ECHO MEAS - SV(AO): 226.1 ML
BH CV ECHO MEAS - SV(CUBED): 35 ML
BH CV ECHO MEAS - SV(MOD-SP4): 40 ML
BH CV ECHO MEAS - SV(TEICH): 32.6 ML
BH CV NUCLEAR PRIOR STUDY: 3
BH CV STRESS BP STAGE 1: NORMAL
BH CV STRESS BP STAGE 2: NORMAL
BH CV STRESS COMMENTS STAGE 1: NORMAL
BH CV STRESS COMMENTS STAGE 2: NORMAL
BH CV STRESS DOSE REGADENOSON STAGE 1: 0.4
BH CV STRESS DURATION MIN STAGE 1: 0
BH CV STRESS DURATION MIN STAGE 2: 4
BH CV STRESS DURATION SEC STAGE 1: 15
BH CV STRESS DURATION SEC STAGE 2: 0
BH CV STRESS HR STAGE 1: 105
BH CV STRESS HR STAGE 2: 84
BH CV STRESS PROTOCOL 1: NORMAL
BH CV STRESS RECOVERY BP: NORMAL MMHG
BH CV STRESS RECOVERY HR: 84 BPM
BH CV STRESS STAGE 1: 1
BH CV STRESS STAGE 2: 2
LV EF 2D ECHO EST: 65 %
LV EF NUC BP: 57 %
MAXIMAL PREDICTED HEART RATE: 163 BPM
MAXIMAL PREDICTED HEART RATE: 163 BPM
PERCENT MAX PREDICTED HR: 64.42 %
STRESS BASELINE BP: NORMAL MMHG
STRESS BASELINE HR: 70 BPM
STRESS PERCENT HR: 76 %
STRESS POST PEAK BP: NORMAL MMHG
STRESS POST PEAK HR: 105 BPM
STRESS TARGET HR: 139 BPM
STRESS TARGET HR: 139 BPM

## 2018-01-02 PROCEDURE — 0 TECHNETIUM SESTAMIBI: Performed by: INTERNAL MEDICINE

## 2018-01-02 PROCEDURE — 93306 TTE W/DOPPLER COMPLETE: CPT

## 2018-01-02 PROCEDURE — A9500 TC99M SESTAMIBI: HCPCS | Performed by: INTERNAL MEDICINE

## 2018-01-02 PROCEDURE — 93017 CV STRESS TEST TRACING ONLY: CPT

## 2018-01-02 PROCEDURE — 25010000002 REGADENOSON 0.4 MG/5ML SOLUTION: Performed by: INTERNAL MEDICINE

## 2018-01-02 PROCEDURE — 78452 HT MUSCLE IMAGE SPECT MULT: CPT | Performed by: INTERNAL MEDICINE

## 2018-01-02 PROCEDURE — 93225 XTRNL ECG REC<48 HRS REC: CPT

## 2018-01-02 PROCEDURE — 93306 TTE W/DOPPLER COMPLETE: CPT | Performed by: INTERNAL MEDICINE

## 2018-01-02 PROCEDURE — 93227 XTRNL ECG REC<48 HR R&I: CPT | Performed by: INTERNAL MEDICINE

## 2018-01-02 PROCEDURE — 93018 CV STRESS TEST I&R ONLY: CPT | Performed by: INTERNAL MEDICINE

## 2018-01-02 PROCEDURE — 93226 XTRNL ECG REC<48 HR SCAN A/R: CPT

## 2018-01-02 PROCEDURE — 78452 HT MUSCLE IMAGE SPECT MULT: CPT

## 2018-01-02 RX ADMIN — TECHNETIUM TC-99M SESTAMIBI 1 DOSE: 1 INJECTION INTRAVENOUS at 07:50

## 2018-01-02 RX ADMIN — REGADENOSON 0.4 MG: 0.08 INJECTION, SOLUTION INTRAVENOUS at 09:30

## 2018-01-02 RX ADMIN — TECHNETIUM TC-99M SESTAMIBI 1 DOSE: 1 INJECTION INTRAVENOUS at 09:30

## 2018-01-24 ENCOUNTER — OFFICE VISIT (OUTPATIENT)
Dept: CARDIOLOGY | Facility: CLINIC | Age: 58
End: 2018-01-24

## 2018-01-24 VITALS
BODY MASS INDEX: 37.92 KG/M2 | HEART RATE: 83 BPM | SYSTOLIC BLOOD PRESSURE: 123 MMHG | WEIGHT: 256 LBS | HEIGHT: 69 IN | OXYGEN SATURATION: 97 % | DIASTOLIC BLOOD PRESSURE: 77 MMHG

## 2018-01-24 DIAGNOSIS — R00.2 PALPITATIONS: Primary | ICD-10-CM

## 2018-01-24 DIAGNOSIS — I25.2 OLD ANTERIOR MYOCARDIAL INFARCTION: ICD-10-CM

## 2018-01-24 DIAGNOSIS — Z72.0 TOBACCO ABUSE: ICD-10-CM

## 2018-01-24 DIAGNOSIS — Z71.6 TOBACCO ABUSE COUNSELING: ICD-10-CM

## 2018-01-24 DIAGNOSIS — E78.5 DYSLIPIDEMIA: ICD-10-CM

## 2018-01-24 DIAGNOSIS — R79.89 ABNORMAL LFTS: ICD-10-CM

## 2018-01-24 DIAGNOSIS — I10 ESSENTIAL HYPERTENSION: ICD-10-CM

## 2018-01-24 DIAGNOSIS — IMO0001 CLASS 2 OBESITY DUE TO EXCESS CALORIES WITH SERIOUS COMORBIDITY AND BODY MASS INDEX (BMI) OF 37.0 TO 37.9 IN ADULT: ICD-10-CM

## 2018-01-24 DIAGNOSIS — R07.2 PRECORDIAL PAIN: ICD-10-CM

## 2018-01-24 PROCEDURE — 99214 OFFICE O/P EST MOD 30 MIN: CPT | Performed by: PHYSICIAN ASSISTANT

## 2018-01-24 RX ORDER — METOPROLOL SUCCINATE 25 MG/1
25 TABLET, EXTENDED RELEASE ORAL
Qty: 30 TABLET | Refills: 5 | Status: SHIPPED | OUTPATIENT
Start: 2018-01-24 | End: 2018-08-20

## 2018-01-24 RX ORDER — OMEGA-3-ACID ETHYL ESTERS 1 G/1
2 CAPSULE, LIQUID FILLED ORAL DAILY
Qty: 60 CAPSULE | Refills: 3 | Status: SHIPPED | OUTPATIENT
Start: 2018-01-24 | End: 2018-05-16 | Stop reason: SDUPTHER

## 2018-01-24 RX ORDER — FENOFIBRATE 48 MG/1
48 TABLET, COATED ORAL DAILY
Qty: 30 TABLET | Refills: 3 | Status: SHIPPED | OUTPATIENT
Start: 2018-01-24 | End: 2018-05-11 | Stop reason: SDUPTHER

## 2018-01-24 NOTE — PROGRESS NOTES
Alyse Earl, BRIAN  Donna Israel  1960  01/24/2018    Patient Active Problem List   Diagnosis   • Gastroesophageal reflux disease   • Neck pain   • Cough   • Degeneration of intervertebral disc of cervical region   • Type 2 diabetes mellitus with circulatory disorder, with long-term current use of insulin   • Hyperlipidemia   • Hypothyroidism   • Memory loss   • Low back pain   • Tobacco abuse   • Adiposity   • Disorder of rotator cuff   • Shoulder pain   • Vitamin D deficiency   • Tear of right rotator cuff   • Impingement syndrome, shoulder   • Chronic cluster headache, not intractable   • Type 2 diabetes mellitus with stage 2 chronic kidney disease, without long-term current use of insulin   • Diabetic neuropathy associated with type 2 diabetes mellitus   • Essential hypertension   • Palpitations   • Abnormal EKG   • Old anterior myocardial infarction     Dear BRIAN Parker:    Chief Complaint   Patient presents with   • Follow-up     Palps.   • meds   • Results     Stress echo and holter.    • Chest Pain     Some   • Shortness of Breath     No change.    • Edema     Feet.    • Dizziness     When bending over.    • Palpitations     Races.      Subjective     Donna Israel is a 57 y.o. female with a past medical history significant for hypertension, significant dyslipidemia, type II diabetes which has been uncontrolled and a positive family history of coronary artery disease. She presents to the office today for follow up after recent testing. Stress test revealed a nonreversible defect which is possible breast attenuation, but no evidence of ischemia. She had a transthoracic echocardiogram revealing no wall motion abnormalities. She also had a 24-hour Holter monitor revealing occasional PACs and PVCs.  Since her last visit, she states she still has some intermittent palpitations with no associated dizziness or near syncopal episodes.  Her chest discomfort is very few and  far between and has not been increasing in frequency or severity.  Denies any chest pain with exertion.  Her breathing has been doing well.   Review of labs from PCP reveals significant hypertriglyceridemia at greater than 900 as well as total cholesterol greater than 280.  LDL was unable to be calculated.  She has some minimal elevation in her AST and ALT which has been present for the last several months.  Further discussion does reveal that she has been taking a lot of Tylenol for complaints of headaches.  She was previously taking ibuprofen for this, however had difficulties with her kidneys and discontinued this and switch to Tylenol.  Denies any alcohol abuse.        Current Outpatient Prescriptions:   •  Blood Glucose Monitoring Suppl kit, 1 kit 2 (Two) Times a Day., Disp: 1 each, Rfl: 0  •  cephalexin (KEFLEX) 500 MG capsule, Take 500 mg by mouth As Needed., Disp: , Rfl:   •  clopidogrel (PLAVIX) 75 MG tablet, Take 1 tablet by mouth Daily., Disp: 30 tablet, Rfl: 3  •  cyclobenzaprine (FLEXERIL) 5 MG tablet, Take 1 tablet by mouth 2 (Two) Times a Day As Needed for Muscle Spasms., Disp: 30 tablet, Rfl: 5  •  glucose blood (COOL BLOOD GLUCOSE TEST STRIPS) test strip, Use as instructed, Disp: 100 each, Rfl: 12  •  insulin degludec (TRESIBA FLEXTOUCH) 100 UNIT/ML solution pen-injector injection, Inject 10 Units under the skin Every Night., Disp: 2 pen, Rfl: 5  •  Insulin Pen Needle (BD PEN NEEDLE BROOKE U/F) 32G X 4 MM misc, 1 Device Daily., Disp: 30 each, Rfl: 5  •  Lancets (FREESTYLE) lancets, Use as instructed, Disp: 100 each, Rfl: 12  •  levothyroxine sodium (TIROSINT) 150 MCG capsule, Take 1 capsule by mouth Daily., Disp: 30 capsule, Rfl: 5  •  loratadine (CLARITIN) 10 MG tablet, Take 1 tablet by mouth Daily As Needed for Allergies., Disp: 30 tablet, Rfl: 5  •  metFORMIN (GLUCOPHAGE) 500 MG tablet, Take 1 tablet by mouth 2 (Two) Times a Day With Meals., Disp: 60 tablet, Rfl: 3  •  metoprolol succinate XL  "(TOPROL XL) 25 MG 24 hr tablet, Take 1 tablet by mouth 2 (Two) Times a Day., Disp: 30 tablet, Rfl: 5  •  promethazine (PHENERGAN) 25 MG tablet, Take 1 tablet by mouth Every 8 (Eight) Hours As Needed for Nausea or Vomiting., Disp: 20 tablet, Rfl: 5  •  raNITIdine (ZANTAC) 150 MG tablet, Take 1 tablet by mouth Every Night., Disp: 30 tablet, Rfl: 5  •  SITagliptin (JANUVIA) 100 MG tablet, Take 1 tablet by mouth Daily., Disp: 30 tablet, Rfl: 5  •  vitamin D (ERGOCALCIFEROL) 48863 units capsule capsule, Take 1 capsule by mouth 1 (One) Time Per Week., Disp: 4 capsule, Rfl: 5  •  fenofibrate (TRICOR) 48 MG tablet, Take 1 tablet by mouth Daily., Disp: 30 tablet, Rfl: 3  •  omega-3 acid ethyl esters (LOVAZA) 1 g capsule, Take 2 capsules by mouth Daily., Disp: 60 capsule, Rfl: 3    The following portions of the patient's history were reviewed and updated as appropriate: allergies, current medications, past family history, past medical history, past social history, past surgical history and problem list.    Social History     Social History   • Marital status:      Spouse name: N/A   • Number of children: N/A   • Years of education: N/A     Occupational History   • Not on file.     Social History Main Topics   • Smoking status: Current Every Day Smoker     Types: Cigarettes   • Smokeless tobacco: Never Used   • Alcohol use No   • Drug use: No   • Sexual activity: Not Currently     Partners: Male     Birth control/ protection: None     Other Topics Concern   • Not on file     Social History Narrative     Review of Systems   Cardiovascular: Positive for chest pain (Some), leg swelling (Feet) and palpitations (Races.). Negative for syncope.   Respiratory: Positive for shortness of breath (No change).    Neurological: Positive for dizziness (When bending over. ).     Objective   Blood pressure 123/77, pulse 83, height 175.3 cm (69\"), weight 116 kg (256 lb), SpO2 97 %.   Body mass index is 37.8 kg/(m^2).    Physical Exam "   Constitutional: She is oriented to person, place, and time. She appears well-developed and well-nourished. No distress.   HENT:   Head: Normocephalic and atraumatic.   Eyes: Conjunctivae are normal. Right eye exhibits no discharge. Left eye exhibits no discharge.   Neck: Normal range of motion. Neck supple. Carotid bruit is not present.   Cardiovascular: Normal rate, regular rhythm and normal heart sounds.  Exam reveals no gallop and no friction rub.    No murmur heard.  Pulmonary/Chest: Effort normal and breath sounds normal. No respiratory distress. She has no wheezes. She has no rales. She exhibits no tenderness.   Musculoskeletal: Normal range of motion. She exhibits no edema.   Neurological: She is alert and oriented to person, place, and time.   Skin: Skin is warm and dry. No rash noted. She is not diaphoretic. No erythema. No pallor.   Psychiatric: She has a normal mood and affect. Her behavior is normal.   Nursing note and vitals reviewed.    Procedures   Transthoracic Echocardiogram 01/02/18  · The study is technically adequate for diagnosis.  · Left ventricular systolic function is normal. Estimated EF = 65%.  · There is no evidence of pericardial effusion.      There is no prior study for comparison.     Lexiscan Stress Test 01/02/18  · Impressions are consistent with an intermediate risk study.  · Myocardial perfusion imaging indicates a located in the anterior wall with no significant ischemia noted. This is not associated with wall motion abnormalities and could represent breast attenuation.  · Left ventricular ejection fraction is normal (Calculated EF = 57%).  · Findings consistent with a normal ECG stress test.  · TID is 1.34. however this is not evident visually. Would correlate clinically.    24 Hour Holter 01/02/18  · A normal monitor study.  · The predominant rhythm noted during the testing period was sinus rhythm.  · Average HR: 84. Min HR: 66. Max HR: 122.  · Occasional PACs and PVCs.No SVT  or V.Tach.  · Sinoatrial node conduction was normal. No atrioventricular block noted.  · Fast heart rate was reported during the monitoring period. Fast heart rate had no correlations  Assessment:        Diagnosis Plan   1. Palpitations     2. Precordial pain     3. Essential hypertension     4. Old anterior myocardial infarction     5. Tobacco abuse     6. Tobacco abuse counseling     7. Class 2 obesity due to excess calories with serious comorbidity and body mass index (BMI) of 37.0 to 37.9 in adult     8. Dyslipidemia  Comprehensive Metabolic Panel   9. Abnormal LFTs  Comprehensive Metabolic Panel        Plan:       1. Stop gemfibrozil.   2. Start lovaza 2000mg QD.   3. Start fenofibrate 48mg QD and advance as tolerated.   4. Recheck CMP and will start low dose atorvastatin 20mg if LFTs stable.   5. I have discussed with her about avoiding tylenol which she has apparently been taking quite a bit of for headaches. I have asked her to follow up with PCP for headache evaluation.   6. Continue plavix.   7. Increase metoprolol succinate to 25mg BID to help with her palpitations and chest pains.   8. If continued or increasing chest pains, will consider left heart catheterization.   9. Encouraged on tobacco cessation and provided with educational materials on the hazards of continued smoking and how to quit. Will check with her pharmacy to see if Chantix may be covered by her insurance as she would like to try this again if possible.   10. Encouraged on diet to help with weight loss as well as her diabetes and cholesterol.   11. Will follow up in 1 month or sooner if needed.     No Follow-up on file.    I appreciate the opportunity to participate in this patient's cardiovascular care.    Best Regards,    Na Werner PA-C

## 2018-01-24 NOTE — PATIENT INSTRUCTIONS
BMI for Adults  Body mass index (BMI) is a number that is calculated from a person's weight and height. In most adults, the number is used to find how much of an adult's weight is made up of fat. BMI is not as accurate as a direct measure of body fat.  HOW IS BMI CALCULATED?  BMI is calculated by dividing weight in kilograms by height in meters squared. It can also be calculated by dividing weight in pounds by height in inches squared, then multiplying the resulting number by 703. Charts are available to help you find your BMI quickly and easily without doing this calculation.   HOW IS BMI INTERPRETED?  Health care professionals use BMI charts to identify whether an adult is underweight, at a normal weight, or overweight based on the following guidelines:  · Underweight: BMI less than 18.5.  · Normal weight: BMI between 18.5 and 24.9.  · Overweight: BMI between 25 and 29.9.  · Obese: BMI of 30 and above.  BMI is usually interpreted the same for males and females.  Weight includes both fat and muscle, so someone with a muscular build, such as an athlete, may have a BMI that is higher than 24.9. In cases like these, BMI may not accurately depict body fat. To determine if excess body fat is the cause of a BMI of 25 or higher, further assessments may need to be done by a health care provider.  WHY IS BMI A USEFUL TOOL?  BMI is used to identify a possible weight problem that may be related to a medical problem or may increase the risk for medical problems. BMI can also be used to promote changes to reach a healthy weight.     This information is not intended to replace advice given to you by your health care provider. Make sure you discuss any questions you have with your health care provider.     Document Released: 08/29/2005 Document Revised: 01/08/2016 Document Reviewed: 05/15/2015  [employer] Interactive Patient Education ©2017 [employer]       Calorie Counting for Weight Loss  Calories are energy you get from the  things you eat and drink. Your body uses this energy to keep you going throughout the day. The number of calories you eat affects your weight. When you eat more calories than your body needs, your body stores the extra calories as fat. When you eat fewer calories than your body needs, your body burns fat to get the energy it needs.  Calorie counting means keeping track of how many calories you eat and drink each day. If you make sure to eat fewer calories than your body needs, you should lose weight. In order for calorie counting to work, you will need to eat the number of calories that are right for you in a day to lose a healthy amount of weight per week. A healthy amount of weight to lose per week is usually 1-2 lb (0.5-0.9 kg). A dietitian can determine how many calories you need in a day and give you suggestions on how to reach your calorie goal.   WHAT IS MY MY PLAN?  My goal is to have __________ calories per day.   If I have this many calories per day, I should lose around __________ pounds per week.  WHAT DO I NEED TO KNOW ABOUT CALORIE COUNTING?  In order to meet your daily calorie goal, you will need to:  · Find out how many calories are in each food you would like to eat. Try to do this before you eat.  · Decide how much of the food you can eat.  · Write down what you ate and how many calories it had. Doing this is called keeping a food log.  WHERE DO I FIND CALORIE INFORMATION?  The number of calories in a food can be found on a Nutrition Facts label. Note that all the information on a label is based on a specific serving of the food. If a food does not have a Nutrition Facts label, try to look up the calories online or ask your dietitian for help.  HOW DO I DECIDE HOW MUCH TO EAT?  To decide how much of the food you can eat, you will need to consider both the number of calories in one serving and the size of one serving. This information can be found on the Nutrition Facts label. If a food does not  have a Nutrition Facts label, look up the information online or ask your dietitian for help.  Remember that calories are listed per serving. If you choose to have more than one serving of a food, you will have to multiply the calories per serving by the amount of servings you plan to eat. For example, the label on a package of bread might say that a serving size is 1 slice and that there are 90 calories in a serving. If you eat 1 slice, you will have eaten 90 calories. If you eat 2 slices, you will have eaten 180 calories.  HOW DO I KEEP A FOOD LOG?  After each meal, record the following information in your food log:  · What you ate.  · How much of it you ate.  · How many calories it had.  · Then, add up your calories.  Keep your food log near you, such as in a small notebook in your pocket. Another option is to use a mobile vy or website. Some programs will calculate calories for you and show you how many calories you have left each time you add an item to the log.  WHAT ARE SOME CALORIE COUNTING TIPS?  · Use your calories on foods and drinks that will fill you up and not leave you hungry. Some examples of this include foods like nuts and nut butters, vegetables, lean proteins, and high-fiber foods (more than 5 g fiber per serving).  · Eat nutritious foods and avoid empty calories. Empty calories are calories you get from foods or beverages that do not have many nutrients, such as candy and soda. It is better to have a nutritious high-calorie food (such as an avocado) than a food with few nutrients (such as a bag of chips).  · Know how many calories are in the foods you eat most often. This way, you do not have to look up how many calories they have each time you eat them.  · Look out for foods that may seem like low-calorie foods but are really high-calorie foods, such as baked goods, soda, and fat-free candy.  · Pay attention to calories in drinks. Drinks such as sodas, specialty coffee drinks, alcohol, and  juices have a lot of calories yet do not fill you up. Choose low-calorie drinks like water and diet drinks.  · Focus your calorie counting efforts on higher calorie items. Logging the calories in a garden salad that contains only vegetables is less important than calculating the calories in a milk shake.  · Find a way of tracking calories that works for you. Get creative. Most people who are successful find ways to keep track of how much they eat in a day, even if they do not count every calorie.  WHAT ARE SOME PORTION CONTROL TIPS?  · Know how many calories are in a serving. This will help you know how many servings of a certain food you can have.  · Use a measuring cup to measure serving sizes. This is helpful when you start out. With time, you will be able to estimate serving sizes for some foods.  · Take some time to put servings of different foods on your favorite plates, bowls, and cups so you know what a serving looks like.  · Try not to eat straight from a bag or box. Doing this can lead to overeating. Put the amount you would like to eat in a cup or on a plate to make sure you are eating the right portion.  · Use smaller plates, glasses, and bowls to prevent overeating. This is a quick and easy way to practice portion control. If your plate is smaller, less food can fit on it.  · Try not to multitask while eating, such as watching TV or using your computer. If it is time to eat, sit down at a table and enjoy your food. Doing this will help you to start recognizing when you are full. It will also make you more aware of what and how much you are eating.  HOW CAN I CALORIE COUNT WHEN EATING OUT?  · Ask for smaller portion sizes or child-sized portions.  · Consider sharing an entree and sides instead of getting your own entree.  · If you get your own entree, eat only half. Ask for a box at the beginning of your meal and put the rest of your entree in it so you are not tempted to eat it.  · Look for the calories  "on the menu. If calories are listed, choose the lower calorie options.  · Choose dishes that include vegetables, fruits, whole grains, low-fat dairy products, and lean protein. Focusing on smart food choices from each of the 5 food groups can help you stay on track at restaurants.  · Choose items that are boiled, broiled, grilled, or steamed.  · Choose water, milk, unsweetened iced tea, or other drinks without added sugars. If you want an alcoholic beverage, choose a lower calorie option. For example, a regular mart can have up to 700 calories and a glass of wine has around 150.  · Stay away from items that are buttered, battered, fried, or served with cream sauce. Items labeled \"crispy\" are usually fried, unless stated otherwise.  · Ask for dressings, sauces, and syrups on the side. These are usually very high in calories, so do not eat much of them.  · Watch out for salads. Many people think salads are a healthy option, but this is often not the case. Many salads come with lanier, fried chicken, lots of cheese, fried chips, and dressing. All of these items have a lot of calories. If you want a salad, choose a garden salad and ask for grilled meats or steak. Ask for the dressing on the side, or ask for olive oil and vinegar or lemon to use as dressing.  · Estimate how many servings of a food you are given. For example, a serving of cooked rice is ½ cup or about the size of half a tennis ball or one cupcake wrapper. Knowing serving sizes will help you be aware of how much food you are eating at restaurants. The list below tells you how big or small some common portion sizes are based on everyday objects.  ¨ 1 oz--4 stacked dice.  ¨ 3 oz--1 deck of cards.  ¨ 1 tsp--1 dice.  ¨ 1 Tbsp--½ a Ping-Pong ball.  ¨ 2 Tbsp--1 Ping-Pong ball.  ¨ ½ cup--1 tennis ball or 1 cupcake wrapper.  ¨ 1 cup--1 baseball.     This information is not intended to replace advice given to you by your health care provider. Make sure you " discuss any questions you have with your health care provider.     Document Released: 2006 Document Revised: 2016 Document Reviewed: 10/23/2014  [employer] Interactive Patient Education © [employer]     Smoking Hazards    Smoking cigarettes is extremely bad for your health. Tobacco smoke has over 200 known poisons in it. It contains the poisonous gases nitrogen oxide and carbon monoxide. There are over 60 chemicals in tobacco smoke that cause cancer. Some of the chemicals found in cigarette smoke include:   · Cyanide.    · Benzene.    · Formaldehyde.    · Methanol (wood alcohol).    · Acetylene (fuel used in welding torches).    · Ammonia.    Even smoking lightly shortens your life expectancy by several years. You can greatly reduce the risk of medical problems for you and your family by stopping now. Smoking is the most preventable cause of death and disease in our society. Within days of quitting smoking, your circulation improves, you decrease the risk of having a heart attack, and your lung capacity improves. There may be some increased phlegm in the first few days after quitting, and it may take months for your lungs to clear up completely. Quitting for 10 years reduces your risk of developing lung cancer to almost that of a nonsmoker.   WHAT ARE THE RISKS OF SMOKING?  Cigarette smokers have an increased risk of many serious medical problems, including:  · Lung cancer.    · Lung disease (such as pneumonia, bronchitis, and emphysema).    · Heart attack and chest pain due to the heart not getting enough oxygen (angina).    · Heart disease and peripheral blood vessel disease.    · Hypertension.    · Stroke.    · Oral cancer (cancer of the lip, mouth, or voice box).    · Bladder cancer.    · Pancreatic cancer.    · Cervical cancer.    · Pregnancy complications, including premature birth.    · Stillbirths and smaller  babies, birth defects, and genetic damage to sperm.    · Early menopause.     · Lower estrogen level for women.    · Infertility.    · Facial wrinkles.    · Blindness.    · Increased risk of broken bones (fractures).    · Senile dementia.    · Stomach ulcers and internal bleeding.    · Delayed wound healing and increased risk of complications during surgery.  Because of secondhand smoke exposure, children of smokers have an increased risk of the following:   · Sudden infant death syndrome (SIDS).    · Respiratory infections.    · Lung cancer.    · Heart disease.    · Ear infections.    WHY IS SMOKING ADDICTIVE?  Nicotine is the chemical agent in tobacco that is capable of causing addiction or dependence. When you smoke and inhale, nicotine is absorbed rapidly into the bloodstream through your lungs. Both inhaled and noninhaled nicotine may be addictive.   WHAT ARE THE BENEFITS OF QUITTING?   There are many health benefits to quitting smoking. Some are:   · The likelihood of developing cancer and heart disease decreases. Health improvements are seen almost immediately.    · Blood pressure, pulse rate, and breathing patterns start returning to normal soon after quitting.    · People who quit may see an improvement in their overall quality of life.    HOW DO YOU QUIT SMOKING?  Smoking is an addiction with both physical and psychological effects, and longtime habits can be hard to change. Your health care provider can recommend:  · Programs and community resources, which may include group support, education, or therapy.  · Replacement products, such as patches, gum, and nasal sprays. Use these products only as directed. Do not replace cigarette smoking with electronic cigarettes (commonly called e-cigarettes). The safety of e-cigarettes is unknown, and some may contain harmful chemicals.  FOR MORE INFORMATION  · American Lung Association: www.lung.org  · American Cancer Society: www.cancer.org     This information is not intended to replace advice given to you by your health care provider.  Make sure you discuss any questions you have with your health care provider.     Document Released: 01/25/2006 Document Revised: 04/10/2017 Document Reviewed: 06/09/2014  [employer] Interactive Patient Education ©2017 [employer]           Steps to Quit Smoking     Smoking tobacco can be harmful to your health and can affect almost every organ in your body. Smoking puts you, and those around you, at risk for developing many serious chronic diseases. Quitting smoking is difficult, but it is one of the best things that you can do for your health. It is never too late to quit.  WHAT ARE THE BENEFITS OF QUITTING SMOKING?  When you quit smoking, you lower your risk of developing serious diseases and conditions, such as:  · Lung cancer or lung disease, such as COPD.  · Heart disease.  · Stroke.  · Heart attack.  · Infertility.  · Osteoporosis and bone fractures.  Additionally, symptoms such as coughing, wheezing, and shortness of breath may get better when you quit. You may also find that you get sick less often because your body is stronger at fighting off colds and infections. If you are pregnant, quitting smoking can help to reduce your chances of having a baby of low birth weight.  HOW DO I GET READY TO QUIT?  When you decide to quit smoking, create a plan to make sure that you are successful. Before you quit:  · Pick a date to quit. Set a date within the next two weeks to give you time to prepare.  · Write down the reasons why you are quitting. Keep this list in places where you will see it often, such as on your bathroom mirror or in your car or wallet.  · Identify the people, places, things, and activities that make you want to smoke (triggers) and avoid them. Make sure to take these actions:  ¨ Throw away all cigarettes at home, at work, and in your car.  ¨ Throw away smoking accessories, such as ashtrays and lighters.  ¨ Clean your car and make sure to empty the ashtray.  ¨ Clean your home, including curtains and  "carpets.  · Tell your family, friends, and coworkers that you are quitting. Support from your loved ones can make quitting easier.  · Talk with your health care provider about your options for quitting smoking.  · Find out what treatment options are covered by your health insurance.  WHAT STRATEGIES CAN I USE TO QUIT SMOKING?   Talk with your healthcare provider about different strategies to quit smoking. Some strategies include:  · Quitting smoking altogether instead of gradually lessening how much you smoke over a period of time. Research shows that quitting \"cold turkey\" is more successful than gradually quitting.  · Attending in-person counseling to help you build problem-solving skills. You are more likely to have success in quitting if you attend several counseling sessions. Even short sessions of 10 minutes can be effective.  · Finding resources and support systems that can help you to quit smoking and remain smoke-free after you quit. These resources are most helpful when you use them often. They can include:  ¨ Online chats with a counselor.  ¨ Telephone quitlines.  ¨ Printed self-help materials.  ¨ Support groups or group counseling.  ¨ Text messaging programs.  ¨ Mobile phone applications.  · Taking medicines to help you quit smoking. (If you are pregnant or breastfeeding, talk with your health care provider first.) Some medicines contain nicotine and some do not. Both types of medicines help with cravings, but the medicines that include nicotine help to relieve withdrawal symptoms. Your health care provider may recommend:  ¨ Nicotine patches, gum, or lozenges.  ¨ Nicotine inhalers or sprays.  ¨ Non-nicotine medicine that is taken by mouth.  Talk with your health care provider about combining strategies, such as taking medicines while you are also receiving in-person counseling. Using these two strategies together makes you more likely to succeed in quitting than if you used either strategy on its " own.  If you are pregnant or breastfeeding, talk with your health care provider about finding counseling or other support strategies to quit smoking. Do not take medicine to help you quit smoking unless told to do so by your health care provider.  WHAT THINGS CAN I DO TO MAKE IT EASIER TO QUIT?  Quitting smoking might feel overwhelming at first, but there is a lot that you can do to make it easier. Take these important actions:  · Reach out to your family and friends and ask that they support and encourage you during this time. Call telephone quitlines, reach out to support groups, or work with a counselor for support.  · Ask people who smoke to avoid smoking around you.  · Avoid places that trigger you to smoke, such as bars, parties, or smoke-break areas at work.  · Spend time around people who do not smoke.  · Lessen stress in your life, because stress can be a smoking trigger for some people. To lessen stress, try:  ¨ Exercising regularly.  ¨ Deep-breathing exercises.  ¨ Yoga.  ¨ Meditating.  ¨ Performing a body scan. This involves closing your eyes, scanning your body from head to toe, and noticing which parts of your body are particularly tense. Purposefully relax the muscles in those areas.  · Download or purchase mobile phone or tablet apps (applications) that can help you stick to your quit plan by providing reminders, tips, and encouragement. There are many free apps, such as QuitGuide from the CDC (Centers for Disease Control and Prevention). You can find other support for quitting smoking (smoking cessation) through smokefree.gov and other websites.  HOW WILL I FEEL WHEN I QUIT SMOKING?  Within the first 24 hours of quitting smoking, you may start to feel some withdrawal symptoms. These symptoms are usually most noticeable 2-3 days after quitting, but they usually do not last beyond 2-3 weeks. Changes or symptoms that you might experience include:  · Mood swings.  · Restlessness, anxiety, or  irritation.  · Difficulty concentrating.  · Dizziness.  · Strong cravings for sugary foods in addition to nicotine.  · Mild weight gain.  · Constipation.  · Nausea.  · Coughing or a sore throat.  · Changes in how your medicines work in your body.  · A depressed mood.  · Difficulty sleeping (insomnia).  After the first 2-3 weeks of quitting, you may start to notice more positive results, such as:  · Improved sense of smell and taste.  · Decreased coughing and sore throat.  · Slower heart rate.  · Lower blood pressure.  · Clearer skin.  · The ability to breathe more easily.  · Fewer sick days.  Quitting smoking is very challenging for most people. Do not get discouraged if you are not successful the first time. Some people need to make many attempts to quit before they achieve long-term success. Do your best to stick to your quit plan, and talk with your health care provider if you have any questions or concerns.     This information is not intended to replace advice given to you by your health care provider. Make sure you discuss any questions you have with your health care provider.     Document Released: 12/12/2002 Document Revised: 05/03/2016 Document Reviewed: 05/03/2016  [employer] Interactive Patient Education ©2017 [employer]

## 2018-02-08 ENCOUNTER — LAB (OUTPATIENT)
Dept: FAMILY MEDICINE CLINIC | Facility: CLINIC | Age: 58
End: 2018-02-08

## 2018-02-08 DIAGNOSIS — I10 ESSENTIAL HYPERTENSION: ICD-10-CM

## 2018-02-08 DIAGNOSIS — R79.89 ABNORMAL LFTS: ICD-10-CM

## 2018-02-08 DIAGNOSIS — E78.2 MIXED HYPERLIPIDEMIA: ICD-10-CM

## 2018-02-08 DIAGNOSIS — E11.51 TYPE 2 DIABETES MELLITUS WITH DIABETIC PERIPHERAL ANGIOPATHY WITHOUT GANGRENE, WITHOUT LONG-TERM CURRENT USE OF INSULIN (HCC): ICD-10-CM

## 2018-02-08 DIAGNOSIS — E55.9 VITAMIN D DEFICIENCY: ICD-10-CM

## 2018-02-08 DIAGNOSIS — E78.5 DYSLIPIDEMIA: ICD-10-CM

## 2018-02-08 LAB
25(OH)D3 SERPL-MCNC: 33 NG/ML
ALBUMIN SERPL-MCNC: 4.7 G/DL (ref 3.5–5)
ALBUMIN UR-MCNC: 9.3 MG/L
ALBUMIN/GLOB SERPL: 1.7 G/DL (ref 1.5–2.5)
ALP SERPL-CCNC: 67 U/L (ref 35–104)
ALT SERPL W P-5'-P-CCNC: 35 U/L (ref 10–36)
ANION GAP SERPL CALCULATED.3IONS-SCNC: 2.7 MMOL/L (ref 3.6–11.2)
AST SERPL-CCNC: 35 U/L (ref 10–30)
BASOPHILS # BLD AUTO: 0.05 10*3/MM3 (ref 0–0.3)
BASOPHILS NFR BLD AUTO: 0.5 % (ref 0–2)
BILIRUB SERPL-MCNC: 0.6 MG/DL (ref 0.2–1.8)
BUN BLD-MCNC: 12 MG/DL (ref 7–21)
BUN/CREAT SERPL: 12 (ref 7–25)
CALCIUM SPEC-SCNC: 9.2 MG/DL (ref 7.7–10)
CHLORIDE SERPL-SCNC: 105 MMOL/L (ref 99–112)
CHOLEST SERPL-MCNC: 222 MG/DL (ref 0–200)
CO2 SERPL-SCNC: 29.3 MMOL/L (ref 24.3–31.9)
CREAT BLD-MCNC: 1 MG/DL (ref 0.43–1.29)
DEPRECATED RDW RBC AUTO: 46.6 FL (ref 37–54)
EOSINOPHIL # BLD AUTO: 0.25 10*3/MM3 (ref 0–0.7)
EOSINOPHIL NFR BLD AUTO: 2.6 % (ref 0–5)
ERYTHROCYTE [DISTWIDTH] IN BLOOD BY AUTOMATED COUNT: 13.4 % (ref 11.5–14.5)
GFR SERPL CREATININE-BSD FRML MDRD: 57 ML/MIN/1.73
GLOBULIN UR ELPH-MCNC: 2.7 GM/DL
GLUCOSE BLD-MCNC: 151 MG/DL (ref 70–110)
HBA1C MFR BLD: 7 % (ref 4.5–5.7)
HCT VFR BLD AUTO: 44.2 % (ref 37–47)
HDLC SERPL-MCNC: 35 MG/DL (ref 60–100)
HGB BLD-MCNC: 14.5 G/DL (ref 12–16)
IMM GRANULOCYTES # BLD: 0.02 10*3/MM3 (ref 0–0.03)
IMM GRANULOCYTES NFR BLD: 0.2 % (ref 0–0.5)
LDLC SERPL CALC-MCNC: 111 MG/DL (ref 0–100)
LDLC/HDLC SERPL: 3.18 {RATIO}
LYMPHOCYTES # BLD AUTO: 2.53 10*3/MM3 (ref 1–3)
LYMPHOCYTES NFR BLD AUTO: 25.9 % (ref 21–51)
MCH RBC QN AUTO: 32.2 PG (ref 27–33)
MCHC RBC AUTO-ENTMCNC: 32.8 G/DL (ref 33–37)
MCV RBC AUTO: 98.2 FL (ref 80–94)
MONOCYTES # BLD AUTO: 0.5 10*3/MM3 (ref 0.1–0.9)
MONOCYTES NFR BLD AUTO: 5.1 % (ref 0–10)
NEUTROPHILS # BLD AUTO: 6.41 10*3/MM3 (ref 1.4–6.5)
NEUTROPHILS NFR BLD AUTO: 65.7 % (ref 30–70)
OSMOLALITY SERPL CALC.SUM OF ELEC: 276.5 MOSM/KG (ref 273–305)
PLATELET # BLD AUTO: 204 10*3/MM3 (ref 130–400)
PMV BLD AUTO: 10.2 FL (ref 6–10)
POTASSIUM BLD-SCNC: 4 MMOL/L (ref 3.5–5.3)
PROT SERPL-MCNC: 7.4 G/DL (ref 6–8)
RBC # BLD AUTO: 4.5 10*6/MM3 (ref 4.2–5.4)
SODIUM BLD-SCNC: 137 MMOL/L (ref 135–153)
TRIGL SERPL-MCNC: 379 MG/DL (ref 0–150)
TSH SERPL DL<=0.05 MIU/L-ACNC: >150 MIU/ML (ref 0.55–4.78)
VLDLC SERPL-MCNC: 75.8 MG/DL
WBC NRBC COR # BLD: 9.76 10*3/MM3 (ref 4.5–12.5)

## 2018-02-08 PROCEDURE — 83036 HEMOGLOBIN GLYCOSYLATED A1C: CPT | Performed by: NURSE PRACTITIONER

## 2018-02-08 PROCEDURE — 36415 COLL VENOUS BLD VENIPUNCTURE: CPT

## 2018-02-08 PROCEDURE — 80061 LIPID PANEL: CPT | Performed by: NURSE PRACTITIONER

## 2018-02-08 PROCEDURE — 80053 COMPREHEN METABOLIC PANEL: CPT | Performed by: NURSE PRACTITIONER

## 2018-02-08 PROCEDURE — 84443 ASSAY THYROID STIM HORMONE: CPT | Performed by: NURSE PRACTITIONER

## 2018-02-08 PROCEDURE — 82043 UR ALBUMIN QUANTITATIVE: CPT | Performed by: NURSE PRACTITIONER

## 2018-02-08 PROCEDURE — 85025 COMPLETE CBC W/AUTO DIFF WBC: CPT | Performed by: NURSE PRACTITIONER

## 2018-02-08 PROCEDURE — 82306 VITAMIN D 25 HYDROXY: CPT | Performed by: NURSE PRACTITIONER

## 2018-02-08 RX ORDER — RANITIDINE 150 MG/1
150 TABLET ORAL NIGHTLY
Qty: 30 TABLET | Refills: 5 | Status: SHIPPED | OUTPATIENT
Start: 2018-02-08 | End: 2018-02-15 | Stop reason: SDUPTHER

## 2018-02-09 DIAGNOSIS — Z79.899 DRUG THERAPY: ICD-10-CM

## 2018-02-09 DIAGNOSIS — E78.5 DYSLIPIDEMIA: Primary | ICD-10-CM

## 2018-02-09 RX ORDER — ATORVASTATIN CALCIUM 20 MG/1
20 TABLET, FILM COATED ORAL DAILY
Qty: 30 TABLET | Refills: 3 | Status: SHIPPED | OUTPATIENT
Start: 2018-02-09 | End: 2018-05-11

## 2018-02-15 ENCOUNTER — OFFICE VISIT (OUTPATIENT)
Dept: FAMILY MEDICINE CLINIC | Facility: CLINIC | Age: 58
End: 2018-02-15

## 2018-02-15 VITALS
SYSTOLIC BLOOD PRESSURE: 122 MMHG | HEIGHT: 69 IN | HEART RATE: 100 BPM | WEIGHT: 253 LBS | BODY MASS INDEX: 37.47 KG/M2 | DIASTOLIC BLOOD PRESSURE: 78 MMHG | TEMPERATURE: 98.7 F | OXYGEN SATURATION: 95 %

## 2018-02-15 DIAGNOSIS — I25.2 OLD ANTERIOR MYOCARDIAL INFARCTION: ICD-10-CM

## 2018-02-15 DIAGNOSIS — I10 ESSENTIAL HYPERTENSION: Primary | ICD-10-CM

## 2018-02-15 DIAGNOSIS — E78.2 MIXED HYPERLIPIDEMIA: ICD-10-CM

## 2018-02-15 DIAGNOSIS — E03.9 ACQUIRED HYPOTHYROIDISM: ICD-10-CM

## 2018-02-15 DIAGNOSIS — E11.9 TYPE 2 DIABETES MELLITUS WITHOUT COMPLICATION, WITHOUT LONG-TERM CURRENT USE OF INSULIN (HCC): ICD-10-CM

## 2018-02-15 DIAGNOSIS — E55.9 VITAMIN D DEFICIENCY: ICD-10-CM

## 2018-02-15 DIAGNOSIS — E55.9 VITAMIN D DEFICIENCY DISEASE: ICD-10-CM

## 2018-02-15 DIAGNOSIS — E11.51 TYPE 2 DIABETES MELLITUS WITH DIABETIC PERIPHERAL ANGIOPATHY WITHOUT GANGRENE, WITHOUT LONG-TERM CURRENT USE OF INSULIN (HCC): ICD-10-CM

## 2018-02-15 PROCEDURE — 99214 OFFICE O/P EST MOD 30 MIN: CPT | Performed by: NURSE PRACTITIONER

## 2018-02-15 RX ORDER — PROMETHAZINE HYDROCHLORIDE 25 MG/1
25 TABLET ORAL EVERY 8 HOURS PRN
Qty: 20 TABLET | Refills: 5 | Status: SHIPPED | OUTPATIENT
Start: 2018-02-15 | End: 2018-05-16 | Stop reason: SDUPTHER

## 2018-02-15 RX ORDER — ERGOCALCIFEROL 1.25 MG/1
50000 CAPSULE ORAL WEEKLY
Qty: 4 CAPSULE | Refills: 5 | Status: SHIPPED | OUTPATIENT
Start: 2018-02-15 | End: 2018-05-16 | Stop reason: SDUPTHER

## 2018-02-15 RX ORDER — RANITIDINE 150 MG/1
150 TABLET ORAL 2 TIMES DAILY
Qty: 60 TABLET | Refills: 5 | Status: SHIPPED | OUTPATIENT
Start: 2018-02-15 | End: 2018-05-16 | Stop reason: SDUPTHER

## 2018-02-15 RX ORDER — LEVOTHYROXINE SODIUM 0.2 MG/1
200 TABLET ORAL DAILY
Qty: 30 TABLET | Refills: 3 | Status: SHIPPED | OUTPATIENT
Start: 2018-02-15 | End: 2018-03-07 | Stop reason: DRUGHIGH

## 2018-02-15 NOTE — PROGRESS NOTES
Subjective   Donna Israel is a 57 y.o. female.     Chief Complaint   Patient presents with   • Abnormal Lab       History of Present Illness       Hypothyroidism - uncontrolled. Pt states she had been taking her synthroid. She is currently taking 150 mcg of synthroid.       DM- A1C improvement from 8.8 in November to 7.0 this month. Working on her diet and compliant with medications. No hypoglycemic or hyperglycemic episodes.    Hypertension /hyperlipidemia uncontrolled/ old MI- recent cardiology consult with medication adjustments.       High risk medication management-patient is currently receiving Plavix.  She denies any abnormal bleeding.      The following portions of the patient's history were reviewed and updated as appropriate: allergies, current medications, past family history, past medical history, past social history, past surgical history and problem list.    Review of Systems   Constitutional: Negative for appetite change, chills, fatigue, fever and unexpected weight change.   HENT: Negative.    Eyes: Negative.  Negative for visual disturbance.   Respiratory: Positive for shortness of breath (with exertions such as stairs ). Negative for cough, chest tightness and wheezing.    Cardiovascular: Positive for leg swelling (feet swell with increased sodium intake or prolonged sitting with feet down ). Negative for chest pain and palpitations.   Gastrointestinal: Positive for nausea (with headaches ). Negative for abdominal pain, blood in stool, constipation, diarrhea and vomiting.   Endocrine: Negative.    Genitourinary: Negative for dysuria and flank pain.   Musculoskeletal: Positive for arthralgias, back pain, gait problem, joint swelling, myalgias and neck pain. Negative for neck stiffness.   Skin: Negative for rash.   Allergic/Immunologic: Negative.    Neurological: Positive for numbness (feet due to neuropathy ).   Hematological: Negative.    Psychiatric/Behavioral: Negative for agitation,  "behavioral problems, dysphoric mood and suicidal ideas. The patient is not nervous/anxious.    All other systems reviewed and are negative.      Objective     /78  Pulse 100  Temp 98.7 °F (37.1 °C) (Oral)   Ht 175.3 cm (69.02\")  Wt 115 kg (253 lb)  SpO2 95%  BMI 37.34 kg/m2  Lab on 02/08/2018   Component Date Value Ref Range Status   • Glucose 02/08/2018 151* 70 - 110 mg/dL Final   • BUN 02/08/2018 12  7 - 21 mg/dL Final   • Creatinine 02/08/2018 1.00  0.43 - 1.29 mg/dL Final   • Sodium 02/08/2018 137  135 - 153 mmol/L Final   • Potassium 02/08/2018 4.0  3.5 - 5.3 mmol/L Final   • Chloride 02/08/2018 105  99 - 112 mmol/L Final   • CO2 02/08/2018 29.3  24.3 - 31.9 mmol/L Final   • Calcium 02/08/2018 9.2  7.7 - 10.0 mg/dL Final   • Total Protein 02/08/2018 7.4  6.0 - 8.0 g/dL Final   • Albumin 02/08/2018 4.70  3.50 - 5.00 g/dL Final   • ALT (SGPT) 02/08/2018 35  10 - 36 U/L Final   • AST (SGOT) 02/08/2018 35* 10 - 30 U/L Final   • Alkaline Phosphatase 02/08/2018 67  35 - 104 U/L Final   • Total Bilirubin 02/08/2018 0.6  0.2 - 1.8 mg/dL Final   • eGFR Non African Amer 02/08/2018 57* >60 mL/min/1.73 Final   • Globulin 02/08/2018 2.7  gm/dL Final   • A/G Ratio 02/08/2018 1.7  1.5 - 2.5 g/dL Final   • BUN/Creatinine Ratio 02/08/2018 12.0  7.0 - 25.0 Final   • Anion Gap 02/08/2018 2.7* 3.6 - 11.2 mmol/L Final   • TSH 02/08/2018 >150.000* 0.550 - 4.780 mIU/mL Final   • Hemoglobin A1C 02/08/2018 7.00* 4.50 - 5.70 % Final   • 25 Hydroxy, Vitamin D 02/08/2018 33.0  ng/ml Final   • Total Cholesterol 02/08/2018 222* 0 - 200 mg/dL Final   • Triglycerides 02/08/2018 379* 0 - 150 mg/dL Final   • HDL Cholesterol 02/08/2018 35* 60 - 100 mg/dL Final   • LDL Cholesterol  02/08/2018 111* 0 - 100 mg/dL Final   • VLDL Cholesterol 02/08/2018 75.8  mg/dL Final   • LDL/HDL Ratio 02/08/2018 3.18   Final   • Microalbumin, Urine 02/08/2018 9.3  mg/L Final   • WBC 02/08/2018 9.76  4.50 - 12.50 10*3/mm3 Final   • RBC 02/08/2018 4.50  " 4.20 - 5.40 10*6/mm3 Final   • Hemoglobin 02/08/2018 14.5  12.0 - 16.0 g/dL Final   • Hematocrit 02/08/2018 44.2  37.0 - 47.0 % Final   • MCV 02/08/2018 98.2* 80.0 - 94.0 fL Final   • MCH 02/08/2018 32.2  27.0 - 33.0 pg Final   • MCHC 02/08/2018 32.8* 33.0 - 37.0 g/dL Final   • RDW 02/08/2018 13.4  11.5 - 14.5 % Final   • RDW-SD 02/08/2018 46.6  37.0 - 54.0 fl Final   • MPV 02/08/2018 10.2* 6.0 - 10.0 fL Final   • Platelets 02/08/2018 204  130 - 400 10*3/mm3 Final   • Neutrophil % 02/08/2018 65.7  30.0 - 70.0 % Final   • Lymphocyte % 02/08/2018 25.9  21.0 - 51.0 % Final   • Monocyte % 02/08/2018 5.1  0.0 - 10.0 % Final   • Eosinophil % 02/08/2018 2.6  0.0 - 5.0 % Final   • Basophil % 02/08/2018 0.5  0.0 - 2.0 % Final   • Immature Grans % 02/08/2018 0.2  0.0 - 0.5 % Final   • Neutrophils, Absolute 02/08/2018 6.41  1.40 - 6.50 10*3/mm3 Final   • Lymphocytes, Absolute 02/08/2018 2.53  1.00 - 3.00 10*3/mm3 Final   • Monocytes, Absolute 02/08/2018 0.50  0.10 - 0.90 10*3/mm3 Final   • Eosinophils, Absolute 02/08/2018 0.25  0.00 - 0.70 10*3/mm3 Final   • Basophils, Absolute 02/08/2018 0.05  0.00 - 0.30 10*3/mm3 Final   • Immature Grans, Absolute 02/08/2018 0.02  0.00 - 0.03 10*3/mm3 Final   • Osmolality Calc 02/08/2018 276.5  273.0 - 305.0 mOsm/kg Final       Physical Exam   Constitutional: She is oriented to person, place, and time. Vital signs are normal. She appears well-developed and well-nourished. No distress.   Visibly obese.    HENT:   Head: Normocephalic and atraumatic.   Right Ear: External ear normal.   Left Ear: External ear normal.   Nose: Nose normal.   Mouth/Throat: Oropharynx is clear and moist. No oropharyngeal exudate.   Eyes: EOM are normal. Pupils are equal, round, and reactive to light.   Neck: Trachea normal and normal range of motion. Neck supple. No tracheal deviation present. No thyroid mass and no thyromegaly present.   Cardiovascular: Normal rate, regular rhythm, normal heart sounds and intact  distal pulses.  Exam reveals no gallop and no friction rub.    No murmur heard.  Pulmonary/Chest: Effort normal and breath sounds normal. No respiratory distress. She has no wheezes. She has no rales. She exhibits no tenderness.   Abdominal: Soft. Bowel sounds are normal. She exhibits no distension and no mass. There is no tenderness. There is no rebound and no guarding.   Musculoskeletal: Normal range of motion.        Lumbar back: She exhibits tenderness, pain and spasm.   Decreased lumbar curvature, there is pain with forward flexion. DTR's +2. No edema.    Lymphadenopathy:     She has no cervical adenopathy.   Neurological: She is alert and oriented to person, place, and time. She has normal reflexes.   CN 2-12 grossly intact    Skin: Skin is warm and dry. No abrasion, no bruising, no lesion and no rash noted. She is not diaphoretic. No erythema. No pallor.   Psychiatric: She has a normal mood and affect. Her speech is normal and behavior is normal. Judgment and thought content normal. Her affect is not inappropriate. Cognition and memory are normal.   Nursing note and vitals reviewed.      Assessment/Plan     Problem List Items Addressed This Visit        Cardiovascular and Mediastinum    Type 2 diabetes mellitus with circulatory disorder, with long-term current use of insulin    Relevant Orders    TSH    Vitamin D 25 Hydroxy    Hemoglobin A1c    Hyperlipidemia    Essential hypertension - Primary    Relevant Orders    TSH    Vitamin D 25 Hydroxy    Hemoglobin A1c    Old anterior myocardial infarction       Digestive    Vitamin D deficiency       Endocrine    Acquired hypothyroidism    Relevant Medications    levothyroxine (SYNTHROID) 200 MCG tablet    Other Relevant Orders    T3, Free    T4, Free      Other Visit Diagnoses     Vitamin D deficiency disease        Relevant Orders    Vitamin D 25 Hydroxy    Type 2 diabetes mellitus without complication, without long-term current use of insulin            Labs from  2/18 reviewed and discussed.   I have discussed diagnosis in detail today allowing time for questions and answers. Pt is aware of reasons to seek urgent or emergent medical care as well as reasons to return to the clinic for evaluation. Possible side effects, interactions and progression of symptoms discussed as well. Pt / family states understanding.   Emotional support and active listening provided.   Cardiology notes reviewed and discussed with patient.   Increase synthroid to 200 mcg daily.   Repeat tsh and thyroid panel in three months, sooner if needed.   Continue plan of care.   Follow up in 3 months. Routine labs fasting one week prior to next office visit. Return sooner if needed.                This document has been electronically signed by:  BRIAN Arrieta, NP-C

## 2018-02-27 ENCOUNTER — TELEPHONE (OUTPATIENT)
Dept: CARDIOLOGY | Facility: CLINIC | Age: 58
End: 2018-02-27

## 2018-05-08 ENCOUNTER — TELEPHONE (OUTPATIENT)
Dept: FAMILY MEDICINE CLINIC | Facility: CLINIC | Age: 58
End: 2018-05-08

## 2018-05-08 ENCOUNTER — LAB (OUTPATIENT)
Dept: FAMILY MEDICINE CLINIC | Facility: CLINIC | Age: 58
End: 2018-05-08

## 2018-05-08 DIAGNOSIS — E78.5 DYSLIPIDEMIA: ICD-10-CM

## 2018-05-08 DIAGNOSIS — E11.51 TYPE 2 DIABETES MELLITUS WITH DIABETIC PERIPHERAL ANGIOPATHY WITHOUT GANGRENE, WITHOUT LONG-TERM CURRENT USE OF INSULIN (HCC): ICD-10-CM

## 2018-05-08 DIAGNOSIS — E55.9 VITAMIN D DEFICIENCY DISEASE: ICD-10-CM

## 2018-05-08 DIAGNOSIS — E03.9 ACQUIRED HYPOTHYROIDISM: ICD-10-CM

## 2018-05-08 DIAGNOSIS — I10 ESSENTIAL HYPERTENSION: ICD-10-CM

## 2018-05-08 DIAGNOSIS — Z79.899 DRUG THERAPY: ICD-10-CM

## 2018-05-08 LAB
25(OH)D3 SERPL-MCNC: 29 NG/ML
ALBUMIN SERPL-MCNC: 4.4 G/DL (ref 3.5–5)
ALP SERPL-CCNC: 67 U/L (ref 35–104)
ALT SERPL W P-5'-P-CCNC: 47 U/L (ref 10–36)
AST SERPL-CCNC: 46 U/L (ref 10–30)
BILIRUB CONJ SERPL-MCNC: 0.1 MG/DL (ref 0–0.2)
BILIRUB INDIRECT SERPL-MCNC: 0.3 MG/DL
BILIRUB SERPL-MCNC: 0.4 MG/DL (ref 0.2–1.8)
CHOLEST SERPL-MCNC: 172 MG/DL (ref 0–200)
HBA1C MFR BLD: 7.9 % (ref 4.5–5.7)
HDLC SERPL-MCNC: 29 MG/DL (ref 60–100)
LDLC SERPL CALC-MCNC: ABNORMAL MG/DL (ref 0–100)
LDLC/HDLC SERPL: ABNORMAL {RATIO}
PROT SERPL-MCNC: 7.2 G/DL (ref 6–8)
T3FREE SERPL-MCNC: 2 PG/ML (ref 2.3–4.2)
T4 FREE SERPL-MCNC: 0.76 NG/DL (ref 0.89–1.76)
TRIGL SERPL-MCNC: 447 MG/DL (ref 0–150)
TSH SERPL DL<=0.05 MIU/L-ACNC: 90.23 MIU/ML (ref 0.55–4.78)
VLDLC SERPL-MCNC: ABNORMAL MG/DL

## 2018-05-08 PROCEDURE — 80076 HEPATIC FUNCTION PANEL: CPT | Performed by: PHYSICIAN ASSISTANT

## 2018-05-08 PROCEDURE — 82306 VITAMIN D 25 HYDROXY: CPT | Performed by: NURSE PRACTITIONER

## 2018-05-08 PROCEDURE — 84443 ASSAY THYROID STIM HORMONE: CPT | Performed by: NURSE PRACTITIONER

## 2018-05-08 PROCEDURE — 84439 ASSAY OF FREE THYROXINE: CPT | Performed by: NURSE PRACTITIONER

## 2018-05-08 PROCEDURE — 83036 HEMOGLOBIN GLYCOSYLATED A1C: CPT | Performed by: NURSE PRACTITIONER

## 2018-05-08 PROCEDURE — 84481 FREE ASSAY (FT-3): CPT | Performed by: NURSE PRACTITIONER

## 2018-05-08 PROCEDURE — 36415 COLL VENOUS BLD VENIPUNCTURE: CPT

## 2018-05-08 PROCEDURE — 80061 LIPID PANEL: CPT | Performed by: PHYSICIAN ASSISTANT

## 2018-05-08 NOTE — TELEPHONE ENCOUNTER
----- Message from BRIAN Parker sent at 5/8/2018 12:54 PM EDT -----  Recommend patient keep her scheduled appointment next week to review her labs further.    Spoke with naveed and she will see us next week

## 2018-05-11 ENCOUNTER — TELEPHONE (OUTPATIENT)
Dept: CARDIOLOGY | Facility: CLINIC | Age: 58
End: 2018-05-11

## 2018-05-11 RX ORDER — FENOFIBRATE 48 MG/1
145 TABLET, COATED ORAL DAILY
Qty: 30 TABLET | Refills: 3 | Status: SHIPPED | OUTPATIENT
Start: 2018-05-11 | End: 2018-08-20

## 2018-05-11 RX ORDER — ATORVASTATIN CALCIUM 20 MG/1
40 TABLET, FILM COATED ORAL DAILY
Qty: 30 TABLET | Refills: 5 | Status: SHIPPED | OUTPATIENT
Start: 2018-05-11 | End: 2018-08-20

## 2018-05-11 NOTE — TELEPHONE ENCOUNTER
Left a detailed voicemail regarding her medication increased dosages.   Told her they were called in to her [facility] pharmacy.

## 2018-05-11 NOTE — TELEPHONE ENCOUNTER
----- Message from Kishor Anders MD sent at 5/11/2018  2:51 PM EDT -----  Increase fenofibrate 145 mg daily and atorvastatin to 40 mg daily.  Prescription sent out.  ----- Message -----  From: Chary Keen CMA  Sent: 5/10/2018   2:18 PM  To: Kishor Anders MD    Sending to Dr. Anders for review, as Na CHUNG is no longer at our office.

## 2018-05-15 ENCOUNTER — DOCUMENTATION (OUTPATIENT)
Dept: CARDIOLOGY | Facility: CLINIC | Age: 58
End: 2018-05-15

## 2018-05-15 NOTE — PROGRESS NOTES
[facility] pharmacy called to clarify an Rx for pt for Fenofibrate.  Per Dr. Anders's note on 5/11/18, pt to increase Fenofibrate from 48 mg daily to 145 mg daily.  When the Rx was sent in that day, it carried over the previous mg of 48 mg, in which after the increase would be 3 tabs daily of this dose..  Pharmacy wanted to know if they could change the Rx to Fenofibrate 145 mg, 1 tab po daily.  I gave them ok to fill this, #[phone] with 0 refills.

## 2018-05-16 ENCOUNTER — OFFICE VISIT (OUTPATIENT)
Dept: FAMILY MEDICINE CLINIC | Facility: CLINIC | Age: 58
End: 2018-05-16

## 2018-05-16 VITALS
OXYGEN SATURATION: 97 % | HEIGHT: 69 IN | SYSTOLIC BLOOD PRESSURE: 130 MMHG | WEIGHT: 259 LBS | HEART RATE: 91 BPM | DIASTOLIC BLOOD PRESSURE: 80 MMHG | TEMPERATURE: 99.3 F | BODY MASS INDEX: 38.36 KG/M2

## 2018-05-16 DIAGNOSIS — Z72.0 TOBACCO ABUSE: ICD-10-CM

## 2018-05-16 DIAGNOSIS — Z00.00 ENCOUNTER FOR PREVENTIVE HEALTH EXAMINATION: ICD-10-CM

## 2018-05-16 DIAGNOSIS — E11.9 TYPE 2 DIABETES MELLITUS WITHOUT COMPLICATION, WITHOUT LONG-TERM CURRENT USE OF INSULIN (HCC): ICD-10-CM

## 2018-05-16 DIAGNOSIS — Z01.00 DIABETIC EYE EXAM (HCC): Primary | ICD-10-CM

## 2018-05-16 DIAGNOSIS — Z12.39 SCREENING FOR BREAST CANCER: ICD-10-CM

## 2018-05-16 DIAGNOSIS — E11.9 DIABETIC EYE EXAM (HCC): Primary | ICD-10-CM

## 2018-05-16 DIAGNOSIS — K21.9 GASTROESOPHAGEAL REFLUX DISEASE WITHOUT ESOPHAGITIS: ICD-10-CM

## 2018-05-16 DIAGNOSIS — R42 VERTIGO: ICD-10-CM

## 2018-05-16 DIAGNOSIS — E78.2 MIXED HYPERLIPIDEMIA: ICD-10-CM

## 2018-05-16 DIAGNOSIS — G89.29 CHRONIC NONINTRACTABLE HEADACHE, UNSPECIFIED HEADACHE TYPE: ICD-10-CM

## 2018-05-16 DIAGNOSIS — E03.9 ACQUIRED HYPOTHYROIDISM: ICD-10-CM

## 2018-05-16 DIAGNOSIS — Z79.4 TYPE 2 DIABETES MELLITUS WITH DIABETIC PERIPHERAL ANGIOPATHY WITHOUT GANGRENE, WITH LONG-TERM CURRENT USE OF INSULIN (HCC): ICD-10-CM

## 2018-05-16 DIAGNOSIS — R51.9 CHRONIC NONINTRACTABLE HEADACHE, UNSPECIFIED HEADACHE TYPE: ICD-10-CM

## 2018-05-16 DIAGNOSIS — E11.51 TYPE 2 DIABETES MELLITUS WITH DIABETIC PERIPHERAL ANGIOPATHY WITHOUT GANGRENE, WITH LONG-TERM CURRENT USE OF INSULIN (HCC): ICD-10-CM

## 2018-05-16 PROCEDURE — 99215 OFFICE O/P EST HI 40 MIN: CPT | Performed by: NURSE PRACTITIONER

## 2018-05-16 RX ORDER — PROMETHAZINE HYDROCHLORIDE 25 MG/1
25 TABLET ORAL EVERY 8 HOURS PRN
Qty: 20 TABLET | Refills: 5 | Status: SHIPPED | OUTPATIENT
Start: 2018-05-16 | End: 2018-08-20 | Stop reason: SDUPTHER

## 2018-05-16 RX ORDER — ERGOCALCIFEROL 1.25 MG/1
50000 CAPSULE ORAL WEEKLY
Qty: 4 CAPSULE | Refills: 5 | Status: SHIPPED | OUTPATIENT
Start: 2018-05-16 | End: 2018-08-20

## 2018-05-16 RX ORDER — OMEGA-3-ACID ETHYL ESTERS 1 G/1
2 CAPSULE, LIQUID FILLED ORAL DAILY
Qty: 60 CAPSULE | Refills: 3 | Status: SHIPPED | OUTPATIENT
Start: 2018-05-16 | End: 2018-08-20

## 2018-05-16 RX ORDER — RANITIDINE 150 MG/1
150 TABLET ORAL 2 TIMES DAILY
Qty: 60 TABLET | Refills: 5 | Status: SHIPPED | OUTPATIENT
Start: 2018-05-16 | End: 2018-08-20 | Stop reason: SDUPTHER

## 2018-05-16 RX ORDER — LEVOTHYROXINE SODIUM 0.2 MG/1
200 TABLET ORAL DAILY
Qty: 30 TABLET | Refills: 3 | Status: SHIPPED | OUTPATIENT
Start: 2018-05-16 | End: 2018-08-20 | Stop reason: SDUPTHER

## 2018-05-16 RX ORDER — LANCETS 28 GAUGE
EACH MISCELLANEOUS
Qty: 100 EACH | Refills: 12 | Status: SHIPPED | OUTPATIENT
Start: 2018-05-16 | End: 2018-08-20 | Stop reason: SDUPTHER
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
## 2018-05-24 DIAGNOSIS — E11.8 CONTROLLED DIABETES MELLITUS TYPE 2 WITH COMPLICATIONS, UNSPECIFIED LONG TERM INSULIN USE STATUS: Primary | ICD-10-CM

## 2018-05-24 DIAGNOSIS — E11.8 CONTROLLED DIABETES MELLITUS TYPE 2 WITH COMPLICATIONS, UNSPECIFIED LONG TERM INSULIN USE STATUS: ICD-10-CM

## 2018-08-13 ENCOUNTER — LAB (OUTPATIENT)
Dept: FAMILY MEDICINE CLINIC | Facility: CLINIC | Age: 58
End: 2018-08-13

## 2018-08-13 DIAGNOSIS — E11.9 TYPE 2 DIABETES MELLITUS WITHOUT COMPLICATION, WITHOUT LONG-TERM CURRENT USE OF INSULIN (HCC): ICD-10-CM

## 2018-08-13 LAB
ALBUMIN SERPL-MCNC: 4.8 G/DL (ref 3.5–5)
ALBUMIN UR-MCNC: 46.9 MG/L
ALBUMIN/GLOB SERPL: 1.6 G/DL (ref 1.5–2.5)
ALP SERPL-CCNC: 69 U/L (ref 35–104)
ALT SERPL W P-5'-P-CCNC: 63 U/L (ref 10–36)
ANION GAP SERPL CALCULATED.3IONS-SCNC: 3.8 MMOL/L (ref 3.6–11.2)
AST SERPL-CCNC: 64 U/L (ref 10–30)
BILIRUB SERPL-MCNC: 0.8 MG/DL (ref 0.2–1.8)
BUN BLD-MCNC: 14 MG/DL (ref 7–21)
BUN/CREAT SERPL: 16.9 (ref 7–25)
CALCIUM SPEC-SCNC: 9.5 MG/DL (ref 7.7–10)
CHLORIDE SERPL-SCNC: 112 MMOL/L (ref 99–112)
CHOLEST SERPL-MCNC: 212 MG/DL (ref 0–200)
CO2 SERPL-SCNC: 25.2 MMOL/L (ref 24.3–31.9)
CREAT BLD-MCNC: 0.83 MG/DL (ref 0.43–1.29)
GFR SERPL CREATININE-BSD FRML MDRD: 71 ML/MIN/1.73
GLOBULIN UR ELPH-MCNC: 3 GM/DL
GLUCOSE BLD-MCNC: 149 MG/DL (ref 70–110)
HBA1C MFR BLD: 8 % (ref 4.5–5.7)
HDLC SERPL-MCNC: 34 MG/DL (ref 60–100)
LDLC SERPL CALC-MCNC: 100 MG/DL (ref 0–100)
LDLC/HDLC SERPL: 2.94 {RATIO}
OSMOLALITY SERPL CALC.SUM OF ELEC: 284.5 MOSM/KG (ref 273–305)
POTASSIUM BLD-SCNC: 4 MMOL/L (ref 3.5–5.3)
PROT SERPL-MCNC: 7.8 G/DL (ref 6–8)
SODIUM BLD-SCNC: 141 MMOL/L (ref 135–153)
TRIGL SERPL-MCNC: 390 MG/DL (ref 0–150)
TSH SERPL DL<=0.05 MIU/L-ACNC: 124.1 MIU/ML (ref 0.55–4.78)
VLDLC SERPL-MCNC: 78 MG/DL

## 2018-08-13 PROCEDURE — 80053 COMPREHEN METABOLIC PANEL: CPT | Performed by: NURSE PRACTITIONER

## 2018-08-13 PROCEDURE — 36415 COLL VENOUS BLD VENIPUNCTURE: CPT | Performed by: NURSE PRACTITIONER

## 2018-08-13 PROCEDURE — 80061 LIPID PANEL: CPT | Performed by: NURSE PRACTITIONER

## 2018-08-13 PROCEDURE — 82043 UR ALBUMIN QUANTITATIVE: CPT | Performed by: NURSE PRACTITIONER

## 2018-08-13 PROCEDURE — 84443 ASSAY THYROID STIM HORMONE: CPT | Performed by: NURSE PRACTITIONER

## 2018-08-13 PROCEDURE — 83036 HEMOGLOBIN GLYCOSYLATED A1C: CPT | Performed by: NURSE PRACTITIONER

## 2018-08-16 ENCOUNTER — TELEPHONE (OUTPATIENT)
Dept: FAMILY MEDICINE CLINIC | Facility: CLINIC | Age: 58
End: 2018-08-16

## 2018-08-16 NOTE — TELEPHONE ENCOUNTER
----- Message from BRIAN Parker sent at 8/13/2018  1:39 PM EDT -----  Fine out of she is taking her Synthroid 200 µg daily.  If she is not then she needs to be taking it because her TSH is very elevated at 124.  Make sure that she has kept her appointment for endocrinology.  If she has missed of these referrals then schedule her back with the soonest endocrinology appointment available and stress to the patient that this is very important.    No answer I did leave her a message and told her to call us back I did ask her if she was taking her med

## 2018-08-20 ENCOUNTER — OFFICE VISIT (OUTPATIENT)
Dept: FAMILY MEDICINE CLINIC | Facility: CLINIC | Age: 58
End: 2018-08-20

## 2018-08-20 VITALS
WEIGHT: 258 LBS | HEART RATE: 103 BPM | DIASTOLIC BLOOD PRESSURE: 80 MMHG | HEIGHT: 69 IN | TEMPERATURE: 98.2 F | SYSTOLIC BLOOD PRESSURE: 120 MMHG | BODY MASS INDEX: 38.21 KG/M2 | OXYGEN SATURATION: 96 %

## 2018-08-20 DIAGNOSIS — M50.30 DEGENERATION OF INTERVERTEBRAL DISC OF CERVICAL REGION: ICD-10-CM

## 2018-08-20 DIAGNOSIS — E55.9 VITAMIN D DEFICIENCY: ICD-10-CM

## 2018-08-20 DIAGNOSIS — Z79.4 TYPE 2 DIABETES MELLITUS WITH DIABETIC PERIPHERAL ANGIOPATHY WITHOUT GANGRENE, WITH LONG-TERM CURRENT USE OF INSULIN (HCC): ICD-10-CM

## 2018-08-20 DIAGNOSIS — E11.51 TYPE 2 DIABETES MELLITUS WITH DIABETIC PERIPHERAL ANGIOPATHY WITHOUT GANGRENE, WITH LONG-TERM CURRENT USE OF INSULIN (HCC): ICD-10-CM

## 2018-08-20 DIAGNOSIS — E78.2 MIXED HYPERLIPIDEMIA: ICD-10-CM

## 2018-08-20 DIAGNOSIS — Z12.39 SCREENING FOR BREAST CANCER: ICD-10-CM

## 2018-08-20 DIAGNOSIS — E11.8 CONTROLLED DIABETES MELLITUS TYPE 2 WITH COMPLICATIONS, UNSPECIFIED LONG TERM INSULIN USE STATUS: ICD-10-CM

## 2018-08-20 DIAGNOSIS — I25.2 OLD ANTERIOR MYOCARDIAL INFARCTION: ICD-10-CM

## 2018-08-20 DIAGNOSIS — I10 ESSENTIAL HYPERTENSION: Primary | ICD-10-CM

## 2018-08-20 DIAGNOSIS — Z00.00 MEDICARE WELCOME EXAM: ICD-10-CM

## 2018-08-20 DIAGNOSIS — E03.9 ACQUIRED HYPOTHYROIDISM: ICD-10-CM

## 2018-08-20 DIAGNOSIS — E11.49 OTHER DIABETIC NEUROLOGICAL COMPLICATION ASSOCIATED WITH TYPE 2 DIABETES MELLITUS (HCC): ICD-10-CM

## 2018-08-20 DIAGNOSIS — Z00.00 ENCOUNTER FOR PREVENTIVE HEALTH EXAMINATION: ICD-10-CM

## 2018-08-20 DIAGNOSIS — K21.9 GASTROESOPHAGEAL REFLUX DISEASE WITHOUT ESOPHAGITIS: ICD-10-CM

## 2018-08-20 PROCEDURE — 99214 OFFICE O/P EST MOD 30 MIN: CPT | Performed by: NURSE PRACTITIONER

## 2018-08-20 PROCEDURE — G0439 PPPS, SUBSEQ VISIT: HCPCS | Performed by: NURSE PRACTITIONER

## 2018-08-20 RX ORDER — PROMETHAZINE HYDROCHLORIDE 25 MG/1
25 TABLET ORAL EVERY 8 HOURS PRN
Qty: 20 TABLET | Refills: 5 | Status: SHIPPED | OUTPATIENT
Start: 2018-08-20 | End: 2019-09-24 | Stop reason: SDUPTHER

## 2018-08-20 RX ORDER — RANITIDINE 150 MG/1
150 TABLET ORAL 2 TIMES DAILY
Qty: 60 TABLET | Refills: 5 | Status: SHIPPED | OUTPATIENT
Start: 2018-08-20 | End: 2018-12-10 | Stop reason: SDUPTHER

## 2018-08-20 RX ORDER — ICOSAPENT ETHYL 1000 MG/1
CAPSULE ORAL
Qty: 120 CAPSULE | Refills: 5 | COMMUNITY
Start: 2018-08-20 | End: 2019-05-07 | Stop reason: SDUPTHER

## 2018-08-20 RX ORDER — LEVOTHYROXINE SODIUM 0.2 MG/1
200 TABLET ORAL DAILY
Qty: 30 TABLET | Refills: 3 | Status: SHIPPED | OUTPATIENT
Start: 2018-08-20 | End: 2019-05-07 | Stop reason: SDUPTHER

## 2018-08-20 RX ORDER — LANCETS 28 GAUGE
EACH MISCELLANEOUS
Qty: 100 EACH | Refills: 12 | Status: SHIPPED | OUTPATIENT
Start: 2018-08-20 | End: 2018-08-24 | Stop reason: SDUPTHER

## 2018-08-20 RX ORDER — PREGABALIN 75 MG/1
75 CAPSULE ORAL 2 TIMES DAILY
Qty: 60 CAPSULE | Refills: 2 | Status: SHIPPED | OUTPATIENT
Start: 2018-08-20 | End: 2018-12-10 | Stop reason: SDUPTHER

## 2018-08-20 RX ORDER — SIMVASTATIN 40 MG
40 TABLET ORAL NIGHTLY
Qty: 30 TABLET | Refills: 5 | Status: SHIPPED | OUTPATIENT
Start: 2018-08-20 | End: 2019-05-07 | Stop reason: SDUPTHER

## 2018-08-20 NOTE — PROGRESS NOTES
QUICK REFERENCE INFORMATION:  The ABCs of the Annual Wellness Visit    Subsequent Medicare Wellness Visit    HEALTH RISK ASSESSMENT    1960    Recent Hospitalizations:  No hospitalization(s) within the last year..        Current Medical Providers:  Patient Care Team:  Alyse Earl APRN as PCP - General (Family Medicine)  Abdoul Alfaro MD as PCP - Claims Attributed        Smoking Status:  History   Smoking Status   • Current Every Day Smoker   • Types: Cigarettes   Smokeless Tobacco   • Never Used       Alcohol Consumption:  History   Alcohol Use No       Depression Screen:   PHQ-2/PHQ-9 Depression Screening 8/20/2018   Little interest or pleasure in doing things 0   Feeling down, depressed, or hopeless 0   Total Score 0       Health Habits and Functional and Cognitive Screening:  Functional & Cognitive Status 8/20/2018   Do you have difficulty preparing food and eating? No   Do you have difficulty bathing yourself, getting dressed or grooming yourself? No   Do you have difficulty using the toilet? No   Do you have difficulty moving around from place to place? Yes   Do you have trouble with steps or getting out of a bed or a chair? Yes   In the past year have you fallen or experienced a near fall? Yes   Current Diet Unhealthy Diet   Dental Exam Not up to date   Eye Exam Not up to date   Exercise (times per week) 0 times per week   Current Exercise Activities Include No Regular Exercise   Do you need help using the phone?  No   Are you deaf or do you have serious difficulty hearing?  No   Do you need help with transportation? Yes   Do you need help shopping? Yes   Do you need help preparing meals?  No   Do you need help with housework?  Yes   Do you need help with laundry? Yes   Do you need help taking your medications? No   Do you need help managing money? No   Do you ever drive or ride in a car without wearing a seat belt? No   Have you felt unusual stress, anger or loneliness in the last month?  No   Who do you live with? Child   If you need help, do you have trouble finding someone available to you? Yes   Have you been bothered in the last four weeks by sexual problems? No   Do you have difficulty concentrating, remembering or making decisions? Yes     Fall Risk Assessment  Fallen in past 6 months: 5--> Yes  Mental Status: 0--> no mental status change  Mobility: 2--> New mobility issue  Medications: 0--> No meds  Total Fall Risk Score: 7    Below are four things you can do to prevent falls:   1. Begin an exercise program to improve your leg strength & balance  2. Ask your doctor or pharmacist to review your medicines   3. Get annual eye check-ups & update your eyeglasses  4. Make your home safer by:  · Removing clutter & tripping hazards  · Putting railings on all stairs & adding grab bars in the bathroom  · Having good lighting, especially on stairs    Contact your local community or senior center for information on exercise, fall prevention programs, or options for improving home safety.  Does the patient have evidence of cognitive impairment? No    Aspirin use counseling: Start ASA 81 mg daily       Recent Lab Results:  CMP:  Lab Results   Component Value Date    BUN 14 08/13/2018    CREATININE 0.83 08/13/2018    EGFRIFNONA 71 08/13/2018    BCR 16.9 08/13/2018     08/13/2018    K 4.0 08/13/2018    CO2 25.2 08/13/2018    CALCIUM 9.5 08/13/2018    ALBUMIN 4.80 08/13/2018    LABIL2 1.4 (L) 03/18/2016    BILITOT 0.8 08/13/2018    ALKPHOS 69 08/13/2018    AST 64 (H) 08/13/2018    ALT 63 (H) 08/13/2018     Lipid Panel:  Lab Results   Component Value Date    CHOL 212 (H) 08/13/2018    TRIG 390 (H) 08/13/2018    HDL 34 (L) 08/13/2018    VLDL 78 08/13/2018    LDLHDL 2.94 08/13/2018     HbA1c:  Lab Results   Component Value Date    HGBA1C 8.00 (H) 08/13/2018     Hearing adequate per whisper test       Visual Acuity:   Visual Acuity Screening    Right eye Left eye Both eyes   Without correction: 20/50 20/70  20/40   With correction:           Age-appropriate Screening Schedule:  Refer to the list below for future screening recommendations based on patient's age, sex and/or medical conditions. Orders for these recommended tests are listed in the plan section. The patient has been provided with a written plan.    Health Maintenance   Topic Date Due   • TDAP/TD VACCINES (1 - Tdap) 03/19/1979   • ZOSTER VACCINE (1 of 2) 03/19/2010   • DXA SCAN  07/13/2016   • COLONOSCOPY  07/13/2016   • MAMMOGRAM  08/01/2016   • DIABETIC EYE EXAM  08/01/2016   • INFLUENZA VACCINE  08/01/2018   • PNEUMOCOCCAL VACCINE (19-64 MEDIUM RISK) (1 of 1 - PPSV23) 06/08/2021 (Originally 4/12/2016)   • DIABETIC FOOT EXAM  11/16/2018   • HEMOGLOBIN A1C  02/13/2019   • LIPID PANEL  08/13/2019   • URINE MICROALBUMIN  08/13/2019   • PAP SMEAR  08/20/2021        Subjective   History of Present Illness      Medicare wellness exam today.  Patient did have a recent lab results to review.  Patient reports that she stopped taking many of her medications due to expense.  Patient is on a limited budget of $750 per month.  She must pay her rent of over $400 a month as well as utilities and purchase all food with this income.  She reports that she stopped taking Lipitor, Plavix, TriCor, Toprol, omega-3 supplement and vitamin D.      Donna Isarel is a 58 y.o. female who presents for an Subsequent Wellness Visit.    Acquired hypothyroidism-patient had been off of her Synthroid for over one month.  TSH greater than 100.    Hypertension-patient reports that she has not taken any blood pressure medication in over 2 months.  She denies any symptoms of hypertension.  She understands the risk associated with not being on her medications.  Does not have a home blood pressure monitor but does frequent to [facility] and can check and monitor it at the pharmacy.  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    GERD-patient reports as long she takes Zantac  her symptoms are stable.    Type 2 diabetes-uncontrolled with hyperglycemia.  Patient reports that she is taking her Januvia, Tresiba and metformin. She does not check her glucose routinely due to the expense of supplies.  She denies any symptomatic hypoglycemia.  She admits to noncompliance with diabetic diet.  Recent labs are on file to review today.    Diabetic neuropathy in both lower extremities-has been seen by podiatry.  Not currently taking any medication for this.  Patient reports that her diabetic foot pain is stinging and burning.  Her feet feel very tender.  She has not been compliant with podiatry follow-ups.    DDD/cervical pain/low back pain/bilateral knee pain-patient reports that she has stiffness and difficulty standing at times.  She declines physical therapy consult.  She declines x-rays due to co-pay.  She is requesting a prescription for a lift chair.  Patient rates pain as 6 on pain scale rating of 0-10.    Hyperlipidemia-much increase in triglyceride level after stopping TriCor and Lipitor.  The following portions of the patient's history were reviewed and updated as appropriate: allergies, current medications, past family history, past medical history, past social history, past surgical history and problem list.    Outpatient Medications Prior to Visit   Medication Sig Dispense Refill   • Blood Glucose Monitoring Suppl kit 1 kit 2 (Two) Times a Day. 1 each 0   • glucose blood (COOL BLOOD GLUCOSE TEST STRIPS) test strip Check sugar 3x times daily. 100 each 12   • insulin degludec (TRESIBA FLEXTOUCH) 100 UNIT/ML solution pen-injector injection Inject 10 Units under the skin Every Night. 2 pen 5   • Insulin Pen Needle (BD PEN NEEDLE BROOKE U/F) 32G X 4 MM misc 1 Device Daily. 30 each 5   • Lancets (FREESTYLE) lancets Use as instructed 100 each 12   • levothyroxine (SYNTHROID, LEVOTHROID) 200 MCG tablet Take 1 tablet by mouth Daily. 30 tablet 3   • metFORMIN (GLUCOPHAGE) 500 MG tablet Take 1  tablet by mouth 2 (Two) Times a Day With Meals. 60 tablet 3   • promethazine (PHENERGAN) 25 MG tablet Take 1 tablet by mouth Every 8 (Eight) Hours As Needed for Nausea or Vomiting. 20 tablet 5   • raNITIdine (ZANTAC) 150 MG tablet Take 1 tablet by mouth 2 (Two) Times a Day. 60 tablet 5   • SITagliptin (JANUVIA) 100 MG tablet Take 1 tablet by mouth Daily. 30 tablet 5   • atorvastatin (LIPITOR) 20 MG tablet Take 2 tablets by mouth Daily. 30 tablet 5   • clopidogrel (PLAVIX) 75 MG tablet Take 1 tablet by mouth Daily. 30 tablet 3   • fenofibrate (TRICOR) 48 MG tablet Take 3 tablets by mouth Daily. 30 tablet 3   • metoprolol succinate XL (TOPROL XL) 25 MG 24 hr tablet Take 1 tablet by mouth 2 (Two) Times a Day. 30 tablet 5   • omega-3 acid ethyl esters (LOVAZA) 1 g capsule Take 2 capsules by mouth Daily. 60 capsule 3   • vitamin D (ERGOCALCIFEROL) 69159 units capsule capsule Take 1 capsule by mouth 1 (One) Time Per Week. 4 capsule 5     No facility-administered medications prior to visit.        Patient Active Problem List   Diagnosis   • Gastroesophageal reflux disease without esophagitis   • Neck pain   • Cough   • Degeneration of intervertebral disc of cervical region   • Type 2 diabetes mellitus with circulatory disorder, with long-term current use of insulin (CMS/HCC)   • Hyperlipidemia   • Acquired hypothyroidism   • Memory loss   • Low back pain   • Tobacco abuse   • Adiposity   • Disorder of rotator cuff   • Shoulder pain   • Vitamin D deficiency   • Tear of right rotator cuff   • Impingement syndrome, shoulder   • Chronic cluster headache, not intractable   • Type 2 diabetes mellitus with stage 2 chronic kidney disease, without long-term current use of insulin (CMS/HCC)   • Diabetic neuropathy associated with type 2 diabetes mellitus (CMS/HCC)   • Essential hypertension   • Palpitations   • Abnormal EKG   • Old anterior myocardial infarction   • Vertigo   • Chronic nonintractable headache   • Screening for  breast cancer   • Encounter for preventive health examination   • Medicare welcome exam       Advance Care Planning:  has NO advance directive - not interested in additional information    Identification of Risk Factors:  Risk factors include: weight , unhealthy diet, cardiovascular risk, inactivity, tobacco use, increased fall risk, inadequate social support and financial stress.    Review of Systems   Constitutional: Negative for activity change, appetite change, chills, fatigue, fever and unexpected weight change.   HENT: Negative for congestion, ear pain, nosebleeds, postnasal drip, rhinorrhea, sinus pain, sinus pressure, sore throat, trouble swallowing and voice change.    Eyes: Negative for pain and visual disturbance.   Respiratory: Negative for cough, chest tightness, shortness of breath and wheezing.    Cardiovascular: Positive for leg swelling (feet and ankles at times ). Negative for chest pain and palpitations.   Gastrointestinal: Negative for abdominal pain, blood in stool, constipation and diarrhea.   Endocrine: Negative for cold intolerance, polydipsia, polyphagia and polyuria.   Genitourinary: Negative for difficulty urinating, dysuria, flank pain and hematuria.   Musculoskeletal: Positive for arthralgias, back pain, gait problem and neck pain. Negative for joint swelling, myalgias and neck stiffness.   Skin: Negative for color change and rash.   Allergic/Immunologic: Negative.    Neurological: Negative for syncope, numbness and headaches.   Hematological: Negative.    Psychiatric/Behavioral: Negative for dysphoric mood, self-injury, sleep disturbance and suicidal ideas. The patient is not nervous/anxious.    All other systems reviewed and are negative.      Compared to one year ago, the patient feels her physical health is better.  Compared to one year ago, the patient feels her mental health is better.    Objective     Physical Exam   Constitutional: She is oriented to person, place, and time.  Vital signs are normal. She appears well-developed and well-nourished. No distress.   Extremely talkative 58 year old obese female.   HENT:   Head: Normocephalic and atraumatic.   Right Ear: External ear normal.   Left Ear: External ear normal.   Nose: Nose normal.   Mouth/Throat: Oropharynx is clear and moist. No oropharyngeal exudate.   Eyes: Pupils are equal, round, and reactive to light. Conjunctivae and EOM are normal. Right eye exhibits no discharge. Left eye exhibits no discharge.   Neck: Normal range of motion. Neck supple. No tracheal deviation present. No thyromegaly present.   Cardiovascular: Normal rate, regular rhythm, normal heart sounds and intact distal pulses.    No murmur heard.  Pulmonary/Chest: Effort normal and breath sounds normal. No respiratory distress. She has no wheezes. She has no rales. She exhibits no tenderness.   Abdominal: Soft. Bowel sounds are normal. She exhibits no distension and no mass. There is no tenderness. There is no rebound and no guarding.   Musculoskeletal:        Lumbar back: She exhibits decreased range of motion, tenderness, pain and spasm.   Decreased lumbar curvature, there is pain with forward flexion. DTR's +2. No edema.     Donna had a diabetic foot exam performed today.  Vascular Status -  Her right foot exhibits abnormal foot edema. Her left foot exhibits abnormal foot edema.  Skin Integrity  -  Her right foot skin is not intact. She has callous right foot, right foot ingrown toenail and right heel is dry and cracked.  She has no right foot ulcer and no right foot blister.  Her left foot skin is not intact.She has callous left foot, left foot ingrown toenail and left heel dry and cracked. She has no left foot ulcer and no left foot blister..  Lymphadenopathy:     She has no cervical adenopathy.   Neurological: She is alert and oriented to person, place, and time. She has normal reflexes.   Skin: Skin is warm and dry. Capillary refill takes less than 2  "seconds. No rash noted. She is not diaphoretic. No erythema. No pallor.   Psychiatric: She has a normal mood and affect. Her speech is normal and behavior is normal. Judgment and thought content normal. Her affect is not inappropriate. Cognition and memory are normal.   Nursing note and vitals reviewed.      Vitals:    08/20/18 1138   BP: 120/80   Pulse: 103   Temp: 98.2 °F (36.8 °C)   TempSrc: Tympanic   SpO2: 96%   Weight: 117 kg (258 lb)   Height: 175.3 cm (69.02\")       Patient's Body mass index is 38.08 kg/m². BMI is above normal parameters. Recommendations include: exercise counseling and nutrition counseling.      Assessment/Plan   Patient Self-Management and Personalized Health Advice  The patient has been provided with information about: diet, exercise, weight management, prevention of cardiac or vascular disease, the relationship between weight and GERD, tobacco cessation, fall prevention, supplements and mental health concerns and preventive services including:   · Advance directive, Bone densitometry screening, Colorectal cancer screening, fecal occult blood test, Counseling for cardiovascular disease risk reduction, Exercise counseling provided, Fall Risk assessment done, Glaucoma screening recommended, Influenza vaccine, Nutrition counseling provided, Pneumococcal vaccine , Screening mammography, referral placed, Screening Pap smear and pelvic exam , Smoking cessation counseling completed, TdaP vaccine, Zostavax vaccine (Herpes Zoster).    Visit Diagnoses:    ICD-10-CM ICD-9-CM   1. Essential hypertension I10 401.9   2. Mixed hyperlipidemia E78.2 272.2   3. Controlled diabetes mellitus type 2 with complications, unspecified long term insulin use status (CMS/[facility]) E11.8 250.90   4. Acquired hypothyroidism E03.9 244.9   5. Type 2 diabetes mellitus with diabetic peripheral angiopathy without gangrene, with long-term current use of insulin (CMS/[facility]) E11.51 250.70    Z79.4 443.81     V58.67   6. Degeneration " of intervertebral disc of cervical region M50.30 722.4   7. Other diabetic neurological complication associated with type 2 diabetes mellitus (CMS/[facility]) E11.49 250.60   8. Encounter for preventive health examination Z00.00 V70.0   9. Gastroesophageal reflux disease without esophagitis K21.9 530.81   10. Old anterior myocardial infarction I25.2 412   11. Vitamin D deficiency E55.9 268.9   12. Medicare welcome exam Z00.00 V70.0   13. Screening for breast cancer Z12.31 V76.10       Orders Placed This Encounter   Procedures   • Mammo Screening Bilateral With CAD     Standing Status:   Future     Standing Expiration Date:   8/20/2019     Order Specific Question:   Reason for Exam:     Answer:   screen   • Urine Drug Screen - Urine, Clean Catch     Standing Status:   Future     Standing Expiration Date:   8/20/2019   • Comprehensive Metabolic Panel     Standing Status:   Future     Standing Expiration Date:   8/21/2019   • TSH     Standing Status:   Future     Standing Expiration Date:   8/20/2019   • Hemoglobin A1c     Standing Status:   Future     Standing Expiration Date:   8/20/2019   • Vitamin D 25 Hydroxy     Standing Status:   Future     Standing Expiration Date:   8/20/2019   • Lipid Panel     Standing Status:   Future     Standing Expiration Date:   8/20/2019   • MicroAlbumin, Urine, Random - Urine, Clean Catch     Standing Status:   Future     Standing Expiration Date:   8/20/2019   • Vitamin B12     Standing Status:   Future     Standing Expiration Date:   8/20/2019   • Ambulatory Referral to Podiatry     Referral Priority:   Routine     Referral Type:   Consultation     Referral Reason:   Specialty Services Required     Requested Specialty:   Podiatry     Number of Visits Requested:   1   • CBC & Differential     Standing Status:   Future     Standing Expiration Date:   8/20/2019     Order Specific Question:   Manual Differential     Answer:   No       Outpatient Encounter Prescriptions as of 8/20/2018    Medication Sig Dispense Refill   • Blood Glucose Monitoring Suppl kit 1 kit 2 (Two) Times a Day. 1 each 0   • glucose blood (COOL BLOOD GLUCOSE TEST STRIPS) test strip Check sugar 3x times daily. 100 each 12   • insulin degludec (TRESIBA FLEXTOUCH) 100 UNIT/ML solution pen-injector injection Inject 10 Units under the skin into the appropriate area as directed Every Night. 2 pen 5   • Insulin Pen Needle (BD PEN NEEDLE BROOKE U/F) 32G X 4 MM misc 1 Device Daily. 30 each 5   • Lancets (FREESTYLE) lancets Use as instructed 100 each 12   • levothyroxine (SYNTHROID, LEVOTHROID) 200 MCG tablet Take 1 tablet by mouth Daily. 30 tablet 3   • metFORMIN (GLUCOPHAGE) 500 MG tablet Take 1 tablet by mouth 2 (Two) Times a Day With Meals. 60 tablet 5   • promethazine (PHENERGAN) 25 MG tablet Take 1 tablet by mouth Every 8 (Eight) Hours As Needed for Nausea or Vomiting. 20 tablet 5   • raNITIdine (ZANTAC) 150 MG tablet Take 1 tablet by mouth 2 (Two) Times a Day. 60 tablet 5   • SITagliptin (JANUVIA) 100 MG tablet Take 1 tablet by mouth Daily. 30 tablet 5   • [DISCONTINUED] glucose blood (COOL BLOOD GLUCOSE TEST STRIPS) test strip Check sugar 3x times daily. 100 each 12   • [DISCONTINUED] insulin degludec (TRESIBA FLEXTOUCH) 100 UNIT/ML solution pen-injector injection Inject 10 Units under the skin Every Night. 2 pen 5   • [DISCONTINUED] Insulin Pen Needle (BD PEN NEEDLE BROOKE U/F) 32G X 4 MM misc 1 Device Daily. 30 each 5   • [DISCONTINUED] Lancets (FREESTYLE) lancets Use as instructed 100 each 12   • [DISCONTINUED] levothyroxine (SYNTHROID, LEVOTHROID) 200 MCG tablet Take 1 tablet by mouth Daily. 30 tablet 3   • [DISCONTINUED] metFORMIN (GLUCOPHAGE) 500 MG tablet Take 1 tablet by mouth 2 (Two) Times a Day With Meals. 60 tablet 3   • [DISCONTINUED] promethazine (PHENERGAN) 25 MG tablet Take 1 tablet by mouth Every 8 (Eight) Hours As Needed for Nausea or Vomiting. 20 tablet 5   • [DISCONTINUED] raNITIdine (ZANTAC) 150 MG tablet Take 1  tablet by mouth 2 (Two) Times a Day. 60 tablet 5   • [DISCONTINUED] SITagliptin (JANUVIA) 100 MG tablet Take 1 tablet by mouth Daily. 30 tablet 5   • aspirin 81 MG tablet Take 1 tablet by mouth Daily. 90 tablet 1   • icosapent ethyl (VASCEPA) 1 g capsule capsule 2 twice daily 120 capsule 5   • pregabalin (LYRICA) 75 MG capsule Take 1 capsule by mouth 2 (Two) Times a Day. 60 capsule 2   • simvastatin (ZOCOR) 40 MG tablet Take 1 tablet by mouth Every Night. 30 tablet 5   • [DISCONTINUED] atorvastatin (LIPITOR) 20 MG tablet Take 2 tablets by mouth Daily. 30 tablet 5   • [DISCONTINUED] clopidogrel (PLAVIX) 75 MG tablet Take 1 tablet by mouth Daily. 30 tablet 3   • [DISCONTINUED] fenofibrate (TRICOR) 48 MG tablet Take 3 tablets by mouth Daily. 30 tablet 3   • [DISCONTINUED] metoprolol succinate XL (TOPROL XL) 25 MG 24 hr tablet Take 1 tablet by mouth 2 (Two) Times a Day. 30 tablet 5   • [DISCONTINUED] omega-3 acid ethyl esters (LOVAZA) 1 g capsule Take 2 capsules by mouth Daily. 60 capsule 3   • [DISCONTINUED] vitamin D (ERGOCALCIFEROL) 31822 units capsule capsule Take 1 capsule by mouth 1 (One) Time Per Week. 4 capsule 5     No facility-administered encounter medications on file as of 8/20/2018.    I have requested that our patient navigator refer patient to [facility] to see if medication assistance can be obtained.  Samples of Januvia 100 mg ×2 months supply have been provided.  Samples of VASCEPA 1000 mg provided to pt x 2 months supply.   Sample of Tresiba 2 pens provided.  Order for lift chair provided.   Pt has been educated/instructed on diabetic care and protocols.  Diabetic diet instructions provided.  Medication regimen reviewed.  Discussed possible side effects and interactions of current medications.  Sick day rules reviewed. Continue to monitor blood sugar and report abnormal readings as discussed today. Understanding stated by patient.     Request Vijay and urine drug screen today.  Start Lyrica  75 mg twice daily #60 with 2 refills.  Discussed need to continue under care of podiatry and referral has been placed.  I have requested her most recent podiatry note which may be 1-2 years old.  Discussed importance of taking her medication.  Patient declines to go back on Plavix that is agreeable to a baby aspirin a day.  She understands the risk.  I have replaced the Lipitor which she had already stopped with Zocor which is on the $4 list at her pharmacy.  Medicare wellness exam has been performed today.  Medication list has been reviewed and discussed with patient.  Recommended preventative and screenings has been discussed with patient.      I have discussed diagnosis in detail today allowing time for questions and answers. Pt is aware of reasons to seek urgent or emergent medical care as well as reasons to return to the clinic for evaluation. Possible side effects, interactions and progression of symptoms discussed as well. Pt / family states understanding.   Emotional support and active listening provided.     Recommend patient return to podiatry.  Discussed diabetic foot care.  Reviewed use of high risk medication in the elderly: yes  Reviewed for potential of harmful drug interactions in the elderly: yes    Age appropriate education and safety instruction has been provided. Preventive education/recommendation > 15 minutes. Safety, accident prevention, vaccines, health maintenance concerns, nutrition, diet, exercise and social activity.     Follow up in 3 months. Routine labs fasting one week prior to next office visit. Return sooner if needed.       Follow Up:  Return in about 3 months (around 11/20/2018) for labs one week prior.      An After Visit Summary and PPPS with all of these plans were given to the patient.

## 2018-08-21 ENCOUNTER — TELEPHONE (OUTPATIENT)
Dept: FAMILY MEDICINE CLINIC | Facility: CLINIC | Age: 58
End: 2018-08-21

## 2018-08-21 NOTE — TELEPHONE ENCOUNTER
Staff have called her pharmacy with verification of patient had not filled her cholesterol medication or Synthroid since February 2018.  Patient has been educated on the need to continue her medications and that she will be on Synthroid for the remainder of her life.

## 2018-08-21 NOTE — TELEPHONE ENCOUNTER
----- Message from BRIAN Parker sent at 8/20/2018 11:46 AM EDT -----  Call pharmacy and get record of when last filled synthroid and cholesterol medication     [facility] said she had not filled those meds since Feb

## 2018-08-24 DIAGNOSIS — E11.49 OTHER DIABETIC NEUROLOGICAL COMPLICATION ASSOCIATED WITH TYPE 2 DIABETES MELLITUS (HCC): ICD-10-CM

## 2018-08-24 DIAGNOSIS — E11.8 CONTROLLED DIABETES MELLITUS TYPE 2 WITH COMPLICATIONS, UNSPECIFIED LONG TERM INSULIN USE STATUS: ICD-10-CM

## 2018-08-24 RX ORDER — LANCETS 28 GAUGE
EACH MISCELLANEOUS
Qty: 100 EACH | Refills: 12 | Status: SHIPPED | OUTPATIENT
Start: 2018-08-24 | End: 2019-05-07 | Stop reason: SDUPTHER

## 2018-09-11 ENCOUNTER — TRANSCRIBE ORDERS (OUTPATIENT)
Dept: ADMINISTRATIVE | Facility: HOSPITAL | Age: 58
End: 2018-09-11

## 2018-09-11 DIAGNOSIS — R42 DIZZINESS: Primary | ICD-10-CM

## 2018-09-17 ENCOUNTER — TELEPHONE (OUTPATIENT)
Dept: FAMILY MEDICINE CLINIC | Facility: CLINIC | Age: 58
End: 2018-09-17

## 2018-09-17 NOTE — TELEPHONE ENCOUNTER
----- Message from BRIAN Parker sent at 9/12/2018  2:06 PM EDT -----  Schedule spot compression of the right breast and ultrasound of bilateral breast at  [facility]    She has appt at [facility] sept 25 at 1 I have let her know of her appt

## 2018-09-21 ENCOUNTER — HOSPITAL ENCOUNTER (OUTPATIENT)
Dept: MRI IMAGING | Facility: HOSPITAL | Age: 58
Discharge: HOME | End: 2018-09-21
Admitting: PSYCHIATRY & NEUROLOGY

## 2018-09-21 DIAGNOSIS — R42 DIZZINESS: ICD-10-CM

## 2018-09-21 PROCEDURE — 70551 MRI BRAIN STEM W/O DYE: CPT | Performed by: RADIOLOGY

## 2018-09-21 PROCEDURE — 70551 MRI BRAIN STEM W/O DYE: CPT

## 2018-09-25 ENCOUNTER — APPOINTMENT (OUTPATIENT)
Dept: MRI IMAGING | Facility: HOSPITAL | Age: 58
End: 2018-09-25

## 2018-11-13 ENCOUNTER — TELEPHONE (OUTPATIENT)
Dept: FAMILY MEDICINE CLINIC | Facility: CLINIC | Age: 58
End: 2018-11-13

## 2018-11-13 ENCOUNTER — LAB (OUTPATIENT)
Dept: FAMILY MEDICINE CLINIC | Facility: CLINIC | Age: 58
End: 2018-11-13

## 2018-11-13 DIAGNOSIS — E55.9 VITAMIN D DEFICIENCY: ICD-10-CM

## 2018-11-13 DIAGNOSIS — M50.30 DEGENERATION OF INTERVERTEBRAL DISC OF CERVICAL REGION: ICD-10-CM

## 2018-11-13 DIAGNOSIS — K21.9 GASTROESOPHAGEAL REFLUX DISEASE WITHOUT ESOPHAGITIS: ICD-10-CM

## 2018-11-13 DIAGNOSIS — I10 ESSENTIAL HYPERTENSION: ICD-10-CM

## 2018-11-13 DIAGNOSIS — Z79.4 TYPE 2 DIABETES MELLITUS WITH DIABETIC PERIPHERAL ANGIOPATHY WITHOUT GANGRENE, WITH LONG-TERM CURRENT USE OF INSULIN (HCC): ICD-10-CM

## 2018-11-13 DIAGNOSIS — E11.51 TYPE 2 DIABETES MELLITUS WITH DIABETIC PERIPHERAL ANGIOPATHY WITHOUT GANGRENE, WITH LONG-TERM CURRENT USE OF INSULIN (HCC): ICD-10-CM

## 2018-11-13 DIAGNOSIS — E11.49 OTHER DIABETIC NEUROLOGICAL COMPLICATION ASSOCIATED WITH TYPE 2 DIABETES MELLITUS (HCC): ICD-10-CM

## 2018-11-13 DIAGNOSIS — E78.2 MIXED HYPERLIPIDEMIA: ICD-10-CM

## 2018-11-13 LAB
25(OH)D3 SERPL-MCNC: 23 NG/ML
ALBUMIN SERPL-MCNC: 4.4 G/DL (ref 3.5–5)
ALBUMIN UR-MCNC: 3.1 MG/L
ALBUMIN/GLOB SERPL: 1.8 G/DL (ref 1.5–2.5)
ALP SERPL-CCNC: 61 U/L (ref 35–104)
ALT SERPL W P-5'-P-CCNC: 28 U/L (ref 10–36)
ANION GAP SERPL CALCULATED.3IONS-SCNC: 1.8 MMOL/L (ref 3.6–11.2)
AST SERPL-CCNC: 25 U/L (ref 10–30)
BASOPHILS # BLD AUTO: 0.04 10*3/MM3 (ref 0–0.3)
BASOPHILS NFR BLD AUTO: 0.6 % (ref 0–2)
BILIRUB SERPL-MCNC: 0.4 MG/DL (ref 0.2–1.8)
BUN BLD-MCNC: 13 MG/DL (ref 7–21)
BUN/CREAT SERPL: 18.1 (ref 7–25)
CALCIUM SPEC-SCNC: 8.9 MG/DL (ref 7.7–10)
CHLORIDE SERPL-SCNC: 112 MMOL/L (ref 99–112)
CHOLEST SERPL-MCNC: 166 MG/DL (ref 0–200)
CO2 SERPL-SCNC: 27.2 MMOL/L (ref 24.3–31.9)
CREAT BLD-MCNC: 0.72 MG/DL (ref 0.43–1.29)
DEPRECATED RDW RBC AUTO: 42.2 FL (ref 37–54)
EOSINOPHIL # BLD AUTO: 0.21 10*3/MM3 (ref 0–0.7)
EOSINOPHIL NFR BLD AUTO: 3 % (ref 0–5)
ERYTHROCYTE [DISTWIDTH] IN BLOOD BY AUTOMATED COUNT: 12.5 % (ref 11.5–14.5)
GFR SERPL CREATININE-BSD FRML MDRD: 83 ML/MIN/1.73
GLOBULIN UR ELPH-MCNC: 2.5 GM/DL
GLUCOSE BLD-MCNC: 152 MG/DL (ref 70–110)
HBA1C MFR BLD: 6.9 % (ref 4.5–5.7)
HCT VFR BLD AUTO: 42.5 % (ref 37–47)
HDLC SERPL-MCNC: 32 MG/DL (ref 60–100)
HGB BLD-MCNC: 13.7 G/DL (ref 12–16)
IMM GRANULOCYTES # BLD: 0.02 10*3/MM3 (ref 0–0.03)
IMM GRANULOCYTES NFR BLD: 0.3 % (ref 0–0.5)
LDLC SERPL CALC-MCNC: 98 MG/DL (ref 0–100)
LDLC/HDLC SERPL: 3.06 {RATIO}
LYMPHOCYTES # BLD AUTO: 1.94 10*3/MM3 (ref 1–3)
LYMPHOCYTES NFR BLD AUTO: 28.2 % (ref 21–51)
MCH RBC QN AUTO: 30.6 PG (ref 27–33)
MCHC RBC AUTO-ENTMCNC: 32.2 G/DL (ref 33–37)
MCV RBC AUTO: 94.9 FL (ref 80–94)
MONOCYTES # BLD AUTO: 0.42 10*3/MM3 (ref 0.1–0.9)
MONOCYTES NFR BLD AUTO: 6.1 % (ref 0–10)
NEUTROPHILS # BLD AUTO: 4.26 10*3/MM3 (ref 1.4–6.5)
NEUTROPHILS NFR BLD AUTO: 61.8 % (ref 30–70)
OSMOLALITY SERPL CALC.SUM OF ELEC: 284.3 MOSM/KG (ref 273–305)
PLATELET # BLD AUTO: 223 10*3/MM3 (ref 130–400)
PMV BLD AUTO: 10.1 FL (ref 6–10)
POTASSIUM BLD-SCNC: 4.1 MMOL/L (ref 3.5–5.3)
PROT SERPL-MCNC: 6.9 G/DL (ref 6–8)
RBC # BLD AUTO: 4.48 10*6/MM3 (ref 4.2–5.4)
SODIUM BLD-SCNC: 141 MMOL/L (ref 135–153)
TRIGL SERPL-MCNC: 181 MG/DL (ref 0–150)
TSH SERPL DL<=0.05 MIU/L-ACNC: 6.65 MIU/ML (ref 0.55–4.78)
VIT B12 BLD-MCNC: 408 PG/ML (ref 211–911)
VLDLC SERPL-MCNC: 36.2 MG/DL
WBC NRBC COR # BLD: 6.89 10*3/MM3 (ref 4.5–12.5)

## 2018-11-13 PROCEDURE — 82607 VITAMIN B-12: CPT | Performed by: NURSE PRACTITIONER

## 2018-11-13 PROCEDURE — 85025 COMPLETE CBC W/AUTO DIFF WBC: CPT | Performed by: NURSE PRACTITIONER

## 2018-11-13 PROCEDURE — 84443 ASSAY THYROID STIM HORMONE: CPT | Performed by: NURSE PRACTITIONER

## 2018-11-13 PROCEDURE — 83036 HEMOGLOBIN GLYCOSYLATED A1C: CPT | Performed by: NURSE PRACTITIONER

## 2018-11-13 PROCEDURE — 80053 COMPREHEN METABOLIC PANEL: CPT | Performed by: NURSE PRACTITIONER

## 2018-11-13 PROCEDURE — 82043 UR ALBUMIN QUANTITATIVE: CPT | Performed by: NURSE PRACTITIONER

## 2018-11-13 PROCEDURE — 82306 VITAMIN D 25 HYDROXY: CPT | Performed by: NURSE PRACTITIONER

## 2018-11-13 PROCEDURE — 80061 LIPID PANEL: CPT | Performed by: NURSE PRACTITIONER

## 2018-11-13 NOTE — TELEPHONE ENCOUNTER
----- Message from BRIAN Parker sent at 11/13/2018  2:09 PM EST -----  B-12 and TSH as well as lipids are mildly improved.  Please encourage her to keep her appointment with me on 11/20/2018 to further review her labs.    Spoke with naveed and let her know about her labs and she said she will be here next week

## 2018-12-10 ENCOUNTER — OFFICE VISIT (OUTPATIENT)
Dept: FAMILY MEDICINE CLINIC | Facility: CLINIC | Age: 58
End: 2018-12-10

## 2018-12-10 VITALS
HEART RATE: 102 BPM | HEIGHT: 69 IN | SYSTOLIC BLOOD PRESSURE: 144 MMHG | WEIGHT: 254 LBS | BODY MASS INDEX: 37.62 KG/M2 | DIASTOLIC BLOOD PRESSURE: 90 MMHG | OXYGEN SATURATION: 97 % | TEMPERATURE: 97.8 F

## 2018-12-10 DIAGNOSIS — Z79.4 TYPE 2 DIABETES MELLITUS WITH DIABETIC PERIPHERAL ANGIOPATHY WITHOUT GANGRENE, WITH LONG-TERM CURRENT USE OF INSULIN (HCC): Primary | ICD-10-CM

## 2018-12-10 DIAGNOSIS — R05.9 COUGH: ICD-10-CM

## 2018-12-10 DIAGNOSIS — N18.2 TYPE 2 DIABETES MELLITUS WITH STAGE 2 CHRONIC KIDNEY DISEASE, WITHOUT LONG-TERM CURRENT USE OF INSULIN (HCC): ICD-10-CM

## 2018-12-10 DIAGNOSIS — K21.9 GASTROESOPHAGEAL REFLUX DISEASE WITHOUT ESOPHAGITIS: ICD-10-CM

## 2018-12-10 DIAGNOSIS — E11.22 TYPE 2 DIABETES MELLITUS WITH STAGE 2 CHRONIC KIDNEY DISEASE, WITHOUT LONG-TERM CURRENT USE OF INSULIN (HCC): ICD-10-CM

## 2018-12-10 DIAGNOSIS — R51.9 CHRONIC NONINTRACTABLE HEADACHE, UNSPECIFIED HEADACHE TYPE: ICD-10-CM

## 2018-12-10 DIAGNOSIS — G89.29 CHRONIC NONINTRACTABLE HEADACHE, UNSPECIFIED HEADACHE TYPE: ICD-10-CM

## 2018-12-10 DIAGNOSIS — E55.9 VITAMIN D DEFICIENCY: ICD-10-CM

## 2018-12-10 DIAGNOSIS — E78.2 MIXED HYPERLIPIDEMIA: ICD-10-CM

## 2018-12-10 DIAGNOSIS — E11.51 TYPE 2 DIABETES MELLITUS WITH DIABETIC PERIPHERAL ANGIOPATHY WITHOUT GANGRENE, WITH LONG-TERM CURRENT USE OF INSULIN (HCC): Primary | ICD-10-CM

## 2018-12-10 DIAGNOSIS — E03.9 ACQUIRED HYPOTHYROIDISM: ICD-10-CM

## 2018-12-10 PROCEDURE — 99214 OFFICE O/P EST MOD 30 MIN: CPT | Performed by: NURSE PRACTITIONER

## 2018-12-10 RX ORDER — PREGABALIN 75 MG/1
75 CAPSULE ORAL 2 TIMES DAILY
Qty: 60 CAPSULE | Refills: 2 | Status: SHIPPED | OUTPATIENT
Start: 2018-12-10 | End: 2019-05-07

## 2018-12-10 RX ORDER — RANITIDINE 150 MG/1
150 TABLET ORAL 2 TIMES DAILY
Qty: 60 TABLET | Refills: 5 | Status: SHIPPED | OUTPATIENT
Start: 2018-12-10 | End: 2019-05-07 | Stop reason: SDUPTHER
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
## 2018-12-17 ENCOUNTER — TELEPHONE (OUTPATIENT)
Dept: FAMILY MEDICINE CLINIC | Facility: CLINIC | Age: 58
End: 2018-12-17

## 2018-12-17 ENCOUNTER — LAB (OUTPATIENT)
Dept: FAMILY MEDICINE CLINIC | Facility: CLINIC | Age: 58
End: 2018-12-17
Payer: MEDICARE

## 2018-12-17 DIAGNOSIS — G89.29 CHRONIC NONINTRACTABLE HEADACHE, UNSPECIFIED HEADACHE TYPE: ICD-10-CM

## 2018-12-17 DIAGNOSIS — R51.9 CHRONIC NONINTRACTABLE HEADACHE, UNSPECIFIED HEADACHE TYPE: ICD-10-CM

## 2018-12-17 RX ORDER — PEN NEEDLE, DIABETIC 29 G X1/2"
NEEDLE, DISPOSABLE MISCELLANEOUS
Qty: 100 EACH | Refills: 5 | Status: SHIPPED | OUTPATIENT
Start: 2018-12-17 | End: 2019-05-07 | Stop reason: SDUPTHER

## 2018-12-17 NOTE — TELEPHONE ENCOUNTER
----- Message from BRIAN Parker sent at 12/10/2018  9:44 AM EST -----  Call pt in this week for a random uds.

## 2018-12-17 NOTE — TELEPHONE ENCOUNTER
-I have called her and can not get in touch with her-    --- Message from BRIAN Parker sent at 12/10/2018  9:44 AM EST -----  Call pt in this week for a random uds.         I

## 2018-12-17 NOTE — TELEPHONE ENCOUNTER
----- Message from BRIAN Parker sent at 12/10/2018  9:44 AM EST -----  Call pt in this week for a random uds.       I have spoke with duogie and she will be in today

## 2019-03-11 RX ORDER — FENOFIBRATE 145 MG/1
TABLET, COATED ORAL
Qty: 90 TABLET | Refills: 0 | OUTPATIENT
Start: 2019-03-11

## 2019-03-13 DIAGNOSIS — E11.49 OTHER DIABETIC NEUROLOGICAL COMPLICATION ASSOCIATED WITH TYPE 2 DIABETES MELLITUS (HCC): ICD-10-CM

## 2019-03-13 RX ORDER — INSULIN DEGLUDEC INJECTION 100 U/ML
INJECTION, SOLUTION SUBCUTANEOUS
Qty: 3 PEN | Refills: 5 | Status: SHIPPED | OUTPATIENT
Start: 2019-03-13 | End: 2019-05-07 | Stop reason: SDUPTHER

## 2019-05-02 ENCOUNTER — LAB (OUTPATIENT)
Dept: FAMILY MEDICINE CLINIC | Facility: CLINIC | Age: 59
End: 2019-05-02

## 2019-05-02 DIAGNOSIS — E03.9 ACQUIRED HYPOTHYROIDISM: ICD-10-CM

## 2019-05-02 DIAGNOSIS — K21.9 GASTROESOPHAGEAL REFLUX DISEASE WITHOUT ESOPHAGITIS: ICD-10-CM

## 2019-05-02 DIAGNOSIS — E55.9 VITAMIN D DEFICIENCY: ICD-10-CM

## 2019-05-02 DIAGNOSIS — Z79.4 TYPE 2 DIABETES MELLITUS WITH DIABETIC PERIPHERAL ANGIOPATHY WITHOUT GANGRENE, WITH LONG-TERM CURRENT USE OF INSULIN (HCC): ICD-10-CM

## 2019-05-02 DIAGNOSIS — E11.51 TYPE 2 DIABETES MELLITUS WITH DIABETIC PERIPHERAL ANGIOPATHY WITHOUT GANGRENE, WITH LONG-TERM CURRENT USE OF INSULIN (HCC): ICD-10-CM

## 2019-05-02 DIAGNOSIS — E11.22 TYPE 2 DIABETES MELLITUS WITH STAGE 2 CHRONIC KIDNEY DISEASE, WITHOUT LONG-TERM CURRENT USE OF INSULIN (HCC): ICD-10-CM

## 2019-05-02 DIAGNOSIS — N18.2 TYPE 2 DIABETES MELLITUS WITH STAGE 2 CHRONIC KIDNEY DISEASE, WITHOUT LONG-TERM CURRENT USE OF INSULIN (HCC): ICD-10-CM

## 2019-05-02 LAB
ALBUMIN SERPL-MCNC: 4.5 G/DL (ref 3.5–5.2)
ALBUMIN UR-MCNC: <1.2 MG/L
ALBUMIN/GLOB SERPL: 1.6 G/DL
ALP SERPL-CCNC: 56 U/L (ref 39–117)
ALT SERPL W P-5'-P-CCNC: 29 U/L (ref 1–33)
ANION GAP SERPL CALCULATED.3IONS-SCNC: 10.9 MMOL/L
AST SERPL-CCNC: 31 U/L (ref 1–32)
BASOPHILS # BLD AUTO: 0.08 10*3/MM3 (ref 0–0.2)
BASOPHILS NFR BLD AUTO: 1 % (ref 0–1.5)
BILIRUB SERPL-MCNC: 0.6 MG/DL (ref 0.2–1.2)
BUN BLD-MCNC: 10 MG/DL (ref 6–20)
BUN/CREAT SERPL: 11.2 (ref 7–25)
CALCIUM SPEC-SCNC: 9.1 MG/DL (ref 8.6–10.5)
CHLORIDE SERPL-SCNC: 100 MMOL/L (ref 98–107)
CHOLEST SERPL-MCNC: 176 MG/DL (ref 0–200)
CO2 SERPL-SCNC: 26.1 MMOL/L (ref 22–29)
CREAT BLD-MCNC: 0.89 MG/DL (ref 0.57–1)
CREAT UR-MCNC: 92.5 MG/DL
DEPRECATED RDW RBC AUTO: 50.8 FL (ref 37–54)
EOSINOPHIL # BLD AUTO: 0.16 10*3/MM3 (ref 0–0.4)
EOSINOPHIL NFR BLD AUTO: 2 % (ref 0.3–6.2)
ERYTHROCYTE [DISTWIDTH] IN BLOOD BY AUTOMATED COUNT: 13.2 % (ref 12.3–15.4)
GFR SERPL CREATININE-BSD FRML MDRD: 65 ML/MIN/1.73
GLOBULIN UR ELPH-MCNC: 2.8 GM/DL
GLUCOSE BLD-MCNC: 170 MG/DL (ref 65–99)
HBA1C MFR BLD: 7.31 % (ref 4.8–5.6)
HCT VFR BLD AUTO: 40.9 % (ref 34–46.6)
HCV AB SER DONR QL: NORMAL
HDLC SERPL-MCNC: 25 MG/DL (ref 40–60)
HGB BLD-MCNC: 13.2 G/DL (ref 12–15.9)
IMM GRANULOCYTES # BLD AUTO: 0.06 10*3/MM3 (ref 0–0.05)
IMM GRANULOCYTES NFR BLD AUTO: 0.8 % (ref 0–0.5)
LDLC SERPL CALC-MCNC: 72 MG/DL (ref 0–100)
LDLC/HDLC SERPL: 2.88 {RATIO}
LYMPHOCYTES # BLD AUTO: 1.76 10*3/MM3 (ref 0.7–3.1)
LYMPHOCYTES NFR BLD AUTO: 22.2 % (ref 19.6–45.3)
MCH RBC QN AUTO: 33.2 PG (ref 26.6–33)
MCHC RBC AUTO-ENTMCNC: 32.3 G/DL (ref 31.5–35.7)
MCV RBC AUTO: 103 FL (ref 79–97)
MICROALBUMIN/CREAT UR: NORMAL MG/G
MONOCYTES # BLD AUTO: 0.39 10*3/MM3 (ref 0.1–0.9)
MONOCYTES NFR BLD AUTO: 4.9 % (ref 5–12)
NEUTROPHILS # BLD AUTO: 5.47 10*3/MM3 (ref 1.7–7)
NEUTROPHILS NFR BLD AUTO: 69.1 % (ref 42.7–76)
NRBC BLD AUTO-RTO: 0 /100 WBC (ref 0–0.2)
PLATELET # BLD AUTO: 190 10*3/MM3 (ref 140–450)
PMV BLD AUTO: 10.4 FL (ref 6–12)
POTASSIUM BLD-SCNC: 4.4 MMOL/L (ref 3.5–5.2)
PROT SERPL-MCNC: 7.3 G/DL (ref 6–8.5)
RBC # BLD AUTO: 3.97 10*6/MM3 (ref 3.77–5.28)
SODIUM BLD-SCNC: 137 MMOL/L (ref 136–145)
TRIGL SERPL-MCNC: 395 MG/DL (ref 0–150)
TSH SERPL DL<=0.05 MIU/L-ACNC: >100 MIU/ML (ref 0.27–4.2)
VLDLC SERPL-MCNC: 79 MG/DL (ref 5–40)
WBC NRBC COR # BLD: 7.92 10*3/MM3 (ref 3.4–10.8)

## 2019-05-02 PROCEDURE — 80061 LIPID PANEL: CPT | Performed by: NURSE PRACTITIONER

## 2019-05-02 PROCEDURE — 83036 HEMOGLOBIN GLYCOSYLATED A1C: CPT | Performed by: NURSE PRACTITIONER

## 2019-05-02 PROCEDURE — 82043 UR ALBUMIN QUANTITATIVE: CPT | Performed by: NURSE PRACTITIONER

## 2019-05-02 PROCEDURE — 86803 HEPATITIS C AB TEST: CPT | Performed by: NURSE PRACTITIONER

## 2019-05-02 PROCEDURE — 85025 COMPLETE CBC W/AUTO DIFF WBC: CPT | Performed by: NURSE PRACTITIONER

## 2019-05-02 PROCEDURE — 80053 COMPREHEN METABOLIC PANEL: CPT | Performed by: NURSE PRACTITIONER

## 2019-05-02 PROCEDURE — 84443 ASSAY THYROID STIM HORMONE: CPT | Performed by: NURSE PRACTITIONER

## 2019-05-02 PROCEDURE — 82570 ASSAY OF URINE CREATININE: CPT | Performed by: NURSE PRACTITIONER

## 2019-05-07 ENCOUNTER — OFFICE VISIT (OUTPATIENT)
Dept: FAMILY MEDICINE CLINIC | Facility: CLINIC | Age: 59
End: 2019-05-07

## 2019-05-07 VITALS
TEMPERATURE: 98.4 F | HEIGHT: 69 IN | HEART RATE: 86 BPM | SYSTOLIC BLOOD PRESSURE: 140 MMHG | OXYGEN SATURATION: 97 % | BODY MASS INDEX: 39.4 KG/M2 | WEIGHT: 266 LBS | DIASTOLIC BLOOD PRESSURE: 86 MMHG

## 2019-05-07 DIAGNOSIS — Z79.4 TYPE 2 DIABETES MELLITUS WITH DIABETIC PERIPHERAL ANGIOPATHY WITHOUT GANGRENE, WITH LONG-TERM CURRENT USE OF INSULIN (HCC): ICD-10-CM

## 2019-05-07 DIAGNOSIS — E11.8 CONTROLLED DIABETES MELLITUS TYPE 2 WITH COMPLICATIONS (HCC): ICD-10-CM

## 2019-05-07 DIAGNOSIS — E78.2 MIXED HYPERLIPIDEMIA: ICD-10-CM

## 2019-05-07 DIAGNOSIS — E03.9 ACQUIRED HYPOTHYROIDISM: ICD-10-CM

## 2019-05-07 DIAGNOSIS — E11.49 OTHER DIABETIC NEUROLOGICAL COMPLICATION ASSOCIATED WITH TYPE 2 DIABETES MELLITUS (HCC): ICD-10-CM

## 2019-05-07 DIAGNOSIS — E11.51 TYPE 2 DIABETES MELLITUS WITH DIABETIC PERIPHERAL ANGIOPATHY WITHOUT GANGRENE, WITH LONG-TERM CURRENT USE OF INSULIN (HCC): ICD-10-CM

## 2019-05-07 DIAGNOSIS — R05.9 COUGH: Primary | ICD-10-CM

## 2019-05-07 DIAGNOSIS — I10 ESSENTIAL HYPERTENSION: ICD-10-CM

## 2019-05-07 DIAGNOSIS — M50.30 DEGENERATION OF INTERVERTEBRAL DISC OF CERVICAL REGION: ICD-10-CM

## 2019-05-07 DIAGNOSIS — K21.9 GASTROESOPHAGEAL REFLUX DISEASE WITHOUT ESOPHAGITIS: ICD-10-CM

## 2019-05-07 PROCEDURE — 99214 OFFICE O/P EST MOD 30 MIN: CPT | Performed by: NURSE PRACTITIONER

## 2019-05-07 PROCEDURE — 99406 BEHAV CHNG SMOKING 3-10 MIN: CPT | Performed by: NURSE PRACTITIONER

## 2019-05-07 RX ORDER — RANITIDINE 150 MG/1
150 TABLET ORAL 2 TIMES DAILY
Qty: 60 TABLET | Refills: 5 | Status: SHIPPED | OUTPATIENT
Start: 2019-05-07 | End: 2019-09-24 | Stop reason: SDUPTHER

## 2019-05-07 RX ORDER — LANCETS 28 GAUGE
EACH MISCELLANEOUS
Qty: 100 EACH | Refills: 12 | Status: SHIPPED | OUTPATIENT
Start: 2019-05-07 | End: 2019-09-24 | Stop reason: SDUPTHER

## 2019-05-07 RX ORDER — LEVOTHYROXINE SODIUM 0.2 MG/1
200 TABLET ORAL DAILY
Qty: 30 TABLET | Refills: 3 | Status: SHIPPED | OUTPATIENT
Start: 2019-05-07 | End: 2019-09-24 | Stop reason: SDUPTHER

## 2019-05-07 RX ORDER — ICOSAPENT ETHYL 1000 MG/1
CAPSULE ORAL
Qty: 120 CAPSULE | Refills: 5 | COMMUNITY
Start: 2019-05-07 | End: 2019-09-24 | Stop reason: SDUPTHER

## 2019-05-07 RX ORDER — SIMVASTATIN 40 MG
40 TABLET ORAL NIGHTLY
Qty: 30 TABLET | Refills: 5 | Status: SHIPPED | OUTPATIENT
Start: 2019-05-07 | End: 2019-09-24

## 2019-05-07 NOTE — ASSESSMENT & PLAN NOTE
Better diabetes control discussed.  Stop Lyrica as patient has not been taking for several months.

## 2019-05-07 NOTE — PROGRESS NOTES
"Subjective   Donna Israel is a 59 y.o. female.     Chief Complaint   Patient presents with   • Diabetes       History of Present Illness:    Pt states that she has not taken any of her medications in two months due to the expense of co-pay.     Hypothyroidism- TSH over 100.  Patient is to take Synthroid 200 mg daily.  She has not had any Synthroid in over 2 months.    Hyperlipidemia-triglycerides greater than 300.  Patient states she has not been taking any of her cholesterol medications or watching her diet.    Type 2 diabetes with stage II chronic kidney disease and long-term insulin use-patient states she has not taken her insulin in 2 months.  She has not been watching her glucose.  Patient reports that she has not been watching her diet.    Cough-patient reports she has had a cough for 2 weeks.  She has a history of pneumonia.  She does smoke around a pack a day.    Smoker-patient smokes 1 pack a day.  She states she cannot afford her medication but she cannot afford $5 a day for a pack cigarettes.      The following portions of the patient's history were reviewed and updated as appropriate:  Allergies, current medications, past family history, past medical history, past social history, past surgical history and problem list.    Review of Systems   Constitutional: Positive for fatigue.   HENT: Negative.    Eyes: Negative.    Respiratory: Positive for cough.    Cardiovascular: Negative.    Gastrointestinal: Negative.    Endocrine: Negative.    Genitourinary: Negative.    Musculoskeletal: Positive for arthralgias, joint swelling and neck pain.   Skin: Negative.    Allergic/Immunologic: Positive for environmental allergies.   Neurological: Negative.    Psychiatric/Behavioral: Negative for dysphoric mood, self-injury and suicidal ideas.       Objective     /86   Pulse 86   Temp 98.4 °F (36.9 °C) (Oral)   Ht 175.3 cm (69.02\")   Wt 121 kg (266 lb)   SpO2 97%   BMI 39.26 kg/m²   Lab on 05/02/2019 "   Component Date Value Ref Range Status   • Hepatitis C Ab 05/02/2019 Non-Reactive  Non-Reactive Final   • Microalbumin/Creatinine Ratio 05/02/2019   mg/g Final   • Creatinine, Urine 05/02/2019 92.5  mg/dL Final   • Microalbumin, Urine 05/02/2019 <1.2  mg/L Final   • Glucose 05/02/2019 170* 65 - 99 mg/dL Final   • BUN 05/02/2019 10  6 - 20 mg/dL Final   • Creatinine 05/02/2019 0.89  0.57 - 1.00 mg/dL Final   • Sodium 05/02/2019 137  136 - 145 mmol/L Final   • Potassium 05/02/2019 4.4  3.5 - 5.2 mmol/L Final   • Chloride 05/02/2019 100  98 - 107 mmol/L Final   • CO2 05/02/2019 26.1  22.0 - 29.0 mmol/L Final   • Calcium 05/02/2019 9.1  8.6 - 10.5 mg/dL Final   • Total Protein 05/02/2019 7.3  6.0 - 8.5 g/dL Final   • Albumin 05/02/2019 4.50  3.50 - 5.20 g/dL Final   • ALT (SGPT) 05/02/2019 29  1 - 33 U/L Final   • AST (SGOT) 05/02/2019 31  1 - 32 U/L Final   • Alkaline Phosphatase 05/02/2019 56  39 - 117 U/L Final   • Total Bilirubin 05/02/2019 0.6  0.2 - 1.2 mg/dL Final   • eGFR Non African Amer 05/02/2019 65  >60 mL/min/1.73 Final   • Globulin 05/02/2019 2.8  gm/dL Final   • A/G Ratio 05/02/2019 1.6  g/dL Final   • BUN/Creatinine Ratio 05/02/2019 11.2  7.0 - 25.0 Final   • Anion Gap 05/02/2019 10.9  mmol/L Final   • TSH 05/02/2019 >100.000* 0.270 - 4.200 mIU/mL Final   • Hemoglobin A1C 05/02/2019 7.31* 4.80 - 5.60 % Final   • Total Cholesterol 05/02/2019 176  0 - 200 mg/dL Final   • Triglycerides 05/02/2019 395* 0 - 150 mg/dL Final   • HDL Cholesterol 05/02/2019 25* 40 - 60 mg/dL Final   • LDL Cholesterol  05/02/2019 72  0 - 100 mg/dL Final   • VLDL Cholesterol 05/02/2019 79* 5 - 40 mg/dL Final   • LDL/HDL Ratio 05/02/2019 2.88   Final   • WBC 05/02/2019 7.92  3.40 - 10.80 10*3/mm3 Final   • RBC 05/02/2019 3.97  3.77 - 5.28 10*6/mm3 Final   • Hemoglobin 05/02/2019 13.2  12.0 - 15.9 g/dL Final   • Hematocrit 05/02/2019 40.9  34.0 - 46.6 % Final   • MCV 05/02/2019 103.0* 79.0 - 97.0 fL Final   • MCH 05/02/2019 33.2*  26.6 - 33.0 pg Final   • MCHC 05/02/2019 32.3  31.5 - 35.7 g/dL Final   • RDW 05/02/2019 13.2  12.3 - 15.4 % Final   • RDW-SD 05/02/2019 50.8  37.0 - 54.0 fl Final   • MPV 05/02/2019 10.4  6.0 - 12.0 fL Final   • Platelets 05/02/2019 190  140 - 450 10*3/mm3 Final   • Neutrophil % 05/02/2019 69.1  42.7 - 76.0 % Final   • Lymphocyte % 05/02/2019 22.2  19.6 - 45.3 % Final   • Monocyte % 05/02/2019 4.9* 5.0 - 12.0 % Final   • Eosinophil % 05/02/2019 2.0  0.3 - 6.2 % Final   • Basophil % 05/02/2019 1.0  0.0 - 1.5 % Final   • Immature Grans % 05/02/2019 0.8* 0.0 - 0.5 % Final   • Neutrophils, Absolute 05/02/2019 5.47  1.70 - 7.00 10*3/mm3 Final   • Lymphocytes, Absolute 05/02/2019 1.76  0.70 - 3.10 10*3/mm3 Final   • Monocytes, Absolute 05/02/2019 0.39  0.10 - 0.90 10*3/mm3 Final   • Eosinophils, Absolute 05/02/2019 0.16  0.00 - 0.40 10*3/mm3 Final   • Basophils, Absolute 05/02/2019 0.08  0.00 - 0.20 10*3/mm3 Final   • Immature Grans, Absolute 05/02/2019 0.06* 0.00 - 0.05 10*3/mm3 Final   • nRBC 05/02/2019 0.0  0.0 - 0.2 /100 WBC Final       Physical Exam   Constitutional: She is oriented to person, place, and time. Vital signs are normal. She appears well-developed and well-nourished. No distress.   HENT:   Head: Normocephalic and atraumatic.   Right Ear: Tympanic membrane, external ear and ear canal normal.   Left Ear: Tympanic membrane, external ear and ear canal normal.   Nose: Nose normal.   Mouth/Throat: Oropharynx is clear and moist and mucous membranes are normal. No oropharyngeal exudate.   Eyes: Conjunctivae, EOM and lids are normal. Pupils are equal, round, and reactive to light. Right eye exhibits no discharge. Left eye exhibits no discharge.   Neck: Normal range of motion. Neck supple. No tracheal deviation present. No thyromegaly present.   Cardiovascular: Normal rate, regular rhythm, normal heart sounds and intact distal pulses. Exam reveals no gallop and no friction rub.   No murmur  heard.  Pulmonary/Chest: Effort normal and breath sounds normal. No respiratory distress. She has no wheezes. She has no rales. She exhibits no tenderness.   Abdominal: Soft. Normal appearance and bowel sounds are normal. She exhibits no distension and no mass. There is no tenderness. There is no rebound and no guarding.   Musculoskeletal: Normal range of motion.        Lumbar back: She exhibits tenderness, pain and spasm.   Decreased lumbar curvature, there is pain with forward flexion. DTR's +2. No edema.    Lymphadenopathy:     She has no cervical adenopathy.   Neurological: She is alert and oriented to person, place, and time. She has normal reflexes.   CN 2-12 grossly intact    Skin: Skin is warm and dry. Capillary refill takes less than 2 seconds. No rash noted. She is not diaphoretic. No erythema. No pallor.   Psychiatric: She has a normal mood and affect. Her speech is normal and behavior is normal. Judgment and thought content normal. Her affect is not inappropriate. Cognition and memory are normal.   Nursing note and vitals reviewed.      Assessment/Plan     Problem List Items Addressed This Visit        Cardiovascular and Mediastinum    Type 2 diabetes mellitus with circulatory disorder, with long-term current use of insulin (CMS/[facility])    Current Assessment & Plan     Diabetes is worsening.   Reminded to bring in blood sugar diary at next visit.  Dietary recommendations for ADA diet.  Regular aerobic exercise.  Discussed ways to avoid symptomatic hypoglycemia.  Discussed sick day management.  Discussed foot care.  discussed compliance. pt will start back on her insulin and metformin and be compliant.   Diabetes will be reassessed in 3 months.         Relevant Medications    icosapent ethyl (VASCEPA) 1 g capsule capsule    Insulin Pen Needle (BD PEN NEEDLE BROOKE U/F) 32G X 4 MM misc    Lancets (FREESTYLE) lancets    metFORMIN (GLUCOPHAGE) 500 MG tablet    Insulin Pen Needle (RELION PEN NEEDLE 31G/8MM) 31G X  8 MM misc    insulin degludec (TRESIBA FLEXTOUCH) 100 UNIT/ML solution pen-injector injection    Hyperlipidemia    Current Assessment & Plan     Again we have discussed her recent labs.  Patient had not been taking any of her cholesterol medications or watching her diet.  I have encouraged patient to take her Zocor and omega-3 supplement.  Weight loss is recommended.  Discussed diet options for elevated triglycerides.         Relevant Medications    icosapent ethyl (VASCEPA) 1 g capsule capsule    simvastatin (ZOCOR) 40 MG tablet    Essential hypertension    Current Assessment & Plan     Hypertension is unchanged.  Continue current treatment regimen.  Blood pressure will be reassessed at the next regular appointment.  Discussed compliance.  Weight loss is recommended.  Stop smoking.  Monitor blood pressure at home or community and report any readings greater than 150/90 or less than 100/60.         Relevant Medications    icosapent ethyl (VASCEPA) 1 g capsule capsule       Respiratory    Cough - Primary    Relevant Orders    XR Chest 2 View       Digestive    Gastroesophageal reflux disease without esophagitis    Current Assessment & Plan     Patient had not been taking her Zantac for some time.  I have reordered her Zantac.  We discussed GERD diet and the need for weight loss.         Relevant Medications    icosapent ethyl (VASCEPA) 1 g capsule capsule    raNITIdine (ZANTAC) 150 MG tablet       Endocrine    Acquired hypothyroidism    Current Assessment & Plan     Patient has been instructed to start back on Synthroid 200 mcg daily.  I have explained thyroid function and its responsibility for body metabolism.  Patient's TSH is over 100 as she has not taken any Synthroid in over 2 months.         Relevant Medications    levothyroxine (SYNTHROID, LEVOTHROID) 200 MCG tablet    Diabetic neuropathy associated with type 2 diabetes mellitus (CMS/[facility])    Current Assessment & Plan     Better diabetes control  discussed.  Stop Lyrica as patient has not been taking for several months.         Relevant Medications    icosapent ethyl (VASCEPA) 1 g capsule capsule    Insulin Pen Needle (BD PEN NEEDLE BROOKE U/F) 32G X 4 MM misc    Lancets (FREESTYLE) lancets    metFORMIN (GLUCOPHAGE) 500 MG tablet    insulin degludec (TRESIBA FLEXTOUCH) 100 UNIT/ML solution pen-injector injection       Musculoskeletal and Integument    Degeneration of intervertebral disc of cervical region    Current Assessment & Plan     Weight loss recommended.  Body mechanics reviewed         Relevant Medications    icosapent ethyl (VASCEPA) 1 g capsule capsule      Other Visit Diagnoses     Controlled diabetes mellitus type 2 with complications (CMS/[facility])        Relevant Medications    aspirin 81 MG tablet    glucose blood (COOL BLOOD GLUCOSE TEST STRIPS) test strip    metFORMIN (GLUCOPHAGE) 500 MG tablet    insulin degludec (TRESIBA FLEXTOUCH) 100 UNIT/ML solution pen-injector injection                   Patient's Body mass index is 39.26 kg/m². BMI is above normal parameters. Recommendations include: exercise counseling and nutrition counseling.    I advised Donna of the risks of continuing to use tobacco, and I provided her with tobacco cessation educational materials in the After Visit Summary.     During this visit, I spent 6 minutes counseling the patient regarding tobacco cessation.  I have advised the patient that if she would stop smoking she could take that $5 a day and apply that toward her co-pay with medication.  We discussed methods to help her stop smoking such as Chantix, patches and other medications such as Wellbutrin or even group sessions.  Patient declines at this time.    I have discussed diagnosis in detail today allowing time for questions and answers. Patient is aware of reasons to seek urgent or emergent medical care as well as reasons to return to the clinic for evaluation. Possible side effects, interactions and progression of  symptoms discussed as well. Patient / family states understanding.   Emotional support and active listening provided.     I have given her an order for chest x-ray which she will have performed at the [facility] today.    Again I have reviewed all of her recent lab results and emphasized lifestyle changes and compliance with her medication regimen as well as weight loss.    Follow up in 3 months. Routine labs fasting one week prior to next office visit. Return sooner if needed.     Dictated utilizing Dragon dictation        This document has been electronically signed by:  BRIAN Arrieta, NP-C

## 2019-05-07 NOTE — ASSESSMENT & PLAN NOTE
Patient had not been taking her Zantac for some time.  I have reordered her Zantac.  We discussed GERD diet and the need for weight loss.

## 2019-05-07 NOTE — ASSESSMENT & PLAN NOTE
Diabetes is worsening.   Reminded to bring in blood sugar diary at next visit.  Dietary recommendations for ADA diet.  Regular aerobic exercise.  Discussed ways to avoid symptomatic hypoglycemia.  Discussed sick day management.  Discussed foot care.  discussed compliance. pt will start back on her insulin and metformin and be compliant.   Diabetes will be reassessed in 3 months.

## 2019-05-07 NOTE — ASSESSMENT & PLAN NOTE
Again we have discussed her recent labs.  Patient had not been taking any of her cholesterol medications or watching her diet.  I have encouraged patient to take her Zocor and omega-3 supplement.  Weight loss is recommended.  Discussed diet options for elevated triglycerides.

## 2019-05-07 NOTE — ASSESSMENT & PLAN NOTE
Hypertension is unchanged.  Continue current treatment regimen.  Blood pressure will be reassessed at the next regular appointment.  Discussed compliance.  Weight loss is recommended.  Stop smoking.  Monitor blood pressure at home or community and report any readings greater than 150/90 or less than 100/60.

## 2019-05-07 NOTE — ASSESSMENT & PLAN NOTE
Patient has been instructed to start back on Synthroid 200 mcg daily.  I have explained thyroid function and its responsibility for body metabolism.  Patient's TSH is over 100 as she has not taken any Synthroid in over 2 months.

## 2019-05-13 ENCOUNTER — TELEPHONE (OUTPATIENT)
Dept: FAMILY MEDICINE CLINIC | Facility: CLINIC | Age: 59
End: 2019-05-13

## 2019-05-13 NOTE — TELEPHONE ENCOUNTER
Pt is aware of this information.   ----- Message from BRIAN Parker sent at 5/13/2019 11:37 AM EDT -----  It is in the chart under imaging and has been signed.  You may discuss the findings with her.  No acute changes found.      ----- Message -----  From: Jami Mayberry MA  Sent: 5/9/2019   3:58 PM  To: BRIAN Parker    Pt wants chest x-ray results.

## 2019-08-29 ENCOUNTER — LAB (OUTPATIENT)
Dept: FAMILY MEDICINE CLINIC | Facility: CLINIC | Age: 59
End: 2019-08-29

## 2019-08-29 DIAGNOSIS — E55.9 VITAMIN D DEFICIENCY: ICD-10-CM

## 2019-08-29 DIAGNOSIS — Z72.0 TOBACCO ABUSE: ICD-10-CM

## 2019-08-29 DIAGNOSIS — M50.30 DEGENERATION OF INTERVERTEBRAL DISC OF CERVICAL REGION: ICD-10-CM

## 2019-08-29 DIAGNOSIS — I10 ESSENTIAL HYPERTENSION: ICD-10-CM

## 2019-08-29 DIAGNOSIS — E78.5 DYSLIPIDEMIA: ICD-10-CM

## 2019-08-29 DIAGNOSIS — N18.2 TYPE 2 DIABETES MELLITUS WITH STAGE 2 CHRONIC KIDNEY DISEASE, WITHOUT LONG-TERM CURRENT USE OF INSULIN (HCC): ICD-10-CM

## 2019-08-29 DIAGNOSIS — E11.22 TYPE 2 DIABETES MELLITUS WITH STAGE 2 CHRONIC KIDNEY DISEASE, WITHOUT LONG-TERM CURRENT USE OF INSULIN (HCC): ICD-10-CM

## 2019-08-29 DIAGNOSIS — R42 VERTIGO: ICD-10-CM

## 2019-08-29 DIAGNOSIS — Z79.4 TYPE 2 DIABETES MELLITUS WITH DIABETIC PERIPHERAL ANGIOPATHY WITHOUT GANGRENE, WITH LONG-TERM CURRENT USE OF INSULIN (HCC): ICD-10-CM

## 2019-08-29 DIAGNOSIS — R05.9 COUGH: ICD-10-CM

## 2019-08-29 DIAGNOSIS — E11.51 TYPE 2 DIABETES MELLITUS WITH DIABETIC PERIPHERAL ANGIOPATHY WITHOUT GANGRENE, WITH LONG-TERM CURRENT USE OF INSULIN (HCC): ICD-10-CM

## 2019-08-29 DIAGNOSIS — G89.29 CHRONIC NONINTRACTABLE HEADACHE, UNSPECIFIED HEADACHE TYPE: ICD-10-CM

## 2019-08-29 DIAGNOSIS — E03.9 HYPOTHYROIDISM, UNSPECIFIED TYPE: ICD-10-CM

## 2019-08-29 DIAGNOSIS — R79.89 ABNORMAL LFTS: ICD-10-CM

## 2019-08-29 DIAGNOSIS — E11.49 OTHER DIABETIC NEUROLOGICAL COMPLICATION ASSOCIATED WITH TYPE 2 DIABETES MELLITUS (HCC): ICD-10-CM

## 2019-08-29 DIAGNOSIS — E11.8 CONTROLLED DIABETES MELLITUS TYPE 2 WITH COMPLICATIONS, UNSPECIFIED WHETHER LONG TERM INSULIN USE (HCC): ICD-10-CM

## 2019-08-29 DIAGNOSIS — E03.9 ACQUIRED HYPOTHYROIDISM: Primary | ICD-10-CM

## 2019-08-29 DIAGNOSIS — K21.9 GASTROESOPHAGEAL REFLUX DISEASE WITHOUT ESOPHAGITIS: ICD-10-CM

## 2019-08-29 DIAGNOSIS — E78.2 MIXED HYPERLIPIDEMIA: ICD-10-CM

## 2019-08-29 DIAGNOSIS — R51.9 CHRONIC NONINTRACTABLE HEADACHE, UNSPECIFIED HEADACHE TYPE: ICD-10-CM

## 2019-08-29 LAB
25(OH)D3 SERPL-MCNC: 22.3 NG/ML (ref 30–100)
ALBUMIN SERPL-MCNC: 5.1 G/DL (ref 3.5–5.2)
ALBUMIN UR-MCNC: <1.2 MG/DL
ALBUMIN/GLOB SERPL: 2 G/DL
ALP SERPL-CCNC: 63 U/L (ref 39–117)
ALT SERPL W P-5'-P-CCNC: 32 U/L (ref 1–33)
ANION GAP SERPL CALCULATED.3IONS-SCNC: 13.2 MMOL/L (ref 5–15)
ARTICHOKE IGE QN: 101 MG/DL (ref 0–100)
AST SERPL-CCNC: 37 U/L (ref 1–32)
BASOPHILS # BLD AUTO: 0.08 10*3/MM3 (ref 0–0.2)
BASOPHILS NFR BLD AUTO: 0.9 % (ref 0–1.5)
BILIRUB SERPL-MCNC: 0.6 MG/DL (ref 0.2–1.2)
BUN BLD-MCNC: 9 MG/DL (ref 6–20)
BUN/CREAT SERPL: 9.5 (ref 7–25)
CALCIUM SPEC-SCNC: 9.4 MG/DL (ref 8.6–10.5)
CHLORIDE SERPL-SCNC: 96 MMOL/L (ref 98–107)
CHOLEST SERPL-MCNC: 248 MG/DL (ref 0–200)
CO2 SERPL-SCNC: 24.8 MMOL/L (ref 22–29)
CREAT BLD-MCNC: 0.95 MG/DL (ref 0.57–1)
CREAT UR-MCNC: 86.5 MG/DL
DEPRECATED RDW RBC AUTO: 54.2 FL (ref 37–54)
EOSINOPHIL # BLD AUTO: 0.17 10*3/MM3 (ref 0–0.4)
EOSINOPHIL NFR BLD AUTO: 2 % (ref 0.3–6.2)
ERYTHROCYTE [DISTWIDTH] IN BLOOD BY AUTOMATED COUNT: 14.9 % (ref 12.3–15.4)
GFR SERPL CREATININE-BSD FRML MDRD: 60 ML/MIN/1.73
GLOBULIN UR ELPH-MCNC: 2.5 GM/DL
GLUCOSE BLD-MCNC: 168 MG/DL (ref 65–99)
HBA1C MFR BLD: 8.19 % (ref 4.8–5.6)
HCT VFR BLD AUTO: 46.5 % (ref 34–46.6)
HDLC SERPL-MCNC: 30 MG/DL (ref 40–60)
HGB BLD-MCNC: 15.1 G/DL (ref 12–15.9)
IMM GRANULOCYTES # BLD AUTO: 0.02 10*3/MM3 (ref 0–0.05)
IMM GRANULOCYTES NFR BLD AUTO: 0.2 % (ref 0–0.5)
LDLC SERPL CALC-MCNC: ABNORMAL MG/DL
LDLC/HDLC SERPL: ABNORMAL {RATIO}
LYMPHOCYTES # BLD AUTO: 2.2 10*3/MM3 (ref 0.7–3.1)
LYMPHOCYTES NFR BLD AUTO: 25.5 % (ref 19.6–45.3)
MCH RBC QN AUTO: 31.7 PG (ref 26.6–33)
MCHC RBC AUTO-ENTMCNC: 32.5 G/DL (ref 31.5–35.7)
MCV RBC AUTO: 97.7 FL (ref 79–97)
MICROALBUMIN/CREAT UR: NORMAL MG/G{CREAT}
MONOCYTES # BLD AUTO: 0.33 10*3/MM3 (ref 0.1–0.9)
MONOCYTES NFR BLD AUTO: 3.8 % (ref 5–12)
NEUTROPHILS # BLD AUTO: 5.83 10*3/MM3 (ref 1.7–7)
NEUTROPHILS NFR BLD AUTO: 67.6 % (ref 42.7–76)
NRBC BLD AUTO-RTO: 0 /100 WBC (ref 0–0.2)
PLATELET # BLD AUTO: 188 10*3/MM3 (ref 140–450)
PMV BLD AUTO: 11.1 FL (ref 6–12)
POTASSIUM BLD-SCNC: 4.2 MMOL/L (ref 3.5–5.2)
PROT SERPL-MCNC: 7.6 G/DL (ref 6–8.5)
RBC # BLD AUTO: 4.76 10*6/MM3 (ref 3.77–5.28)
SODIUM BLD-SCNC: 134 MMOL/L (ref 136–145)
TRIGL SERPL-MCNC: 925 MG/DL (ref 0–150)
TSH SERPL DL<=0.05 MIU/L-ACNC: 494 UIU/ML (ref 0.27–4.2)
VIT B12 BLD-MCNC: 533 PG/ML (ref 211–946)
VLDLC SERPL-MCNC: ABNORMAL MG/DL
WBC NRBC COR # BLD: 8.63 10*3/MM3 (ref 3.4–10.8)

## 2019-08-29 PROCEDURE — 82570 ASSAY OF URINE CREATININE: CPT | Performed by: NURSE PRACTITIONER

## 2019-08-29 PROCEDURE — 85025 COMPLETE CBC W/AUTO DIFF WBC: CPT | Performed by: NURSE PRACTITIONER

## 2019-08-29 PROCEDURE — 80061 LIPID PANEL: CPT | Performed by: NURSE PRACTITIONER

## 2019-08-29 PROCEDURE — 82607 VITAMIN B-12: CPT | Performed by: NURSE PRACTITIONER

## 2019-08-29 PROCEDURE — 82043 UR ALBUMIN QUANTITATIVE: CPT | Performed by: NURSE PRACTITIONER

## 2019-08-29 PROCEDURE — 84443 ASSAY THYROID STIM HORMONE: CPT | Performed by: NURSE PRACTITIONER

## 2019-08-29 PROCEDURE — 83721 ASSAY OF BLOOD LIPOPROTEIN: CPT

## 2019-08-29 PROCEDURE — 83036 HEMOGLOBIN GLYCOSYLATED A1C: CPT | Performed by: NURSE PRACTITIONER

## 2019-08-29 PROCEDURE — 80053 COMPREHEN METABOLIC PANEL: CPT | Performed by: NURSE PRACTITIONER

## 2019-08-29 PROCEDURE — 82306 VITAMIN D 25 HYDROXY: CPT | Performed by: NURSE PRACTITIONER

## 2019-09-03 ENCOUNTER — TELEPHONE (OUTPATIENT)
Dept: FAMILY MEDICINE CLINIC | Facility: CLINIC | Age: 59
End: 2019-09-03

## 2019-09-03 RX ORDER — LEVOTHYROXINE SODIUM 0.05 MG/1
50 TABLET ORAL DAILY
Qty: 30 TABLET | Refills: 5 | Status: SHIPPED | OUTPATIENT
Start: 2019-09-03 | End: 2019-09-03

## 2019-09-03 NOTE — TELEPHONE ENCOUNTER
----- Message from BRIAN Parker sent at 8/29/2019  8:13 PM EDT -----  Please call the pt and see if she is taking synthroid everyday. If she is than lets increase her dose to 250 mcg daily with one month supply and 3 refills. If not then she needs to take her synthroid daily and repeat tsh in 12 weeks. Call pharmacy to check and see when the last fill date was. See me next week to review labs.

## 2019-09-24 ENCOUNTER — OFFICE VISIT (OUTPATIENT)
Dept: FAMILY MEDICINE CLINIC | Facility: CLINIC | Age: 59
End: 2019-09-24

## 2019-09-24 VITALS
OXYGEN SATURATION: 97 % | SYSTOLIC BLOOD PRESSURE: 130 MMHG | HEART RATE: 107 BPM | TEMPERATURE: 98.1 F | DIASTOLIC BLOOD PRESSURE: 70 MMHG | HEIGHT: 69 IN | WEIGHT: 255 LBS | BODY MASS INDEX: 37.77 KG/M2

## 2019-09-24 DIAGNOSIS — E78.2 MIXED HYPERLIPIDEMIA: ICD-10-CM

## 2019-09-24 DIAGNOSIS — Z12.11 ENCOUNTER FOR SCREENING COLONOSCOPY: ICD-10-CM

## 2019-09-24 DIAGNOSIS — E03.9 ACQUIRED HYPOTHYROIDISM: ICD-10-CM

## 2019-09-24 DIAGNOSIS — Z79.4 TYPE 2 DIABETES MELLITUS WITH DIABETIC PERIPHERAL ANGIOPATHY WITHOUT GANGRENE, WITH LONG-TERM CURRENT USE OF INSULIN (HCC): ICD-10-CM

## 2019-09-24 DIAGNOSIS — E55.9 VITAMIN D DEFICIENCY: ICD-10-CM

## 2019-09-24 DIAGNOSIS — K21.9 GASTROESOPHAGEAL REFLUX DISEASE WITHOUT ESOPHAGITIS: ICD-10-CM

## 2019-09-24 DIAGNOSIS — I10 ESSENTIAL HYPERTENSION: ICD-10-CM

## 2019-09-24 DIAGNOSIS — M50.30 DEGENERATION OF INTERVERTEBRAL DISC OF CERVICAL REGION: ICD-10-CM

## 2019-09-24 DIAGNOSIS — E11.8 CONTROLLED DIABETES MELLITUS TYPE 2 WITH COMPLICATIONS (HCC): ICD-10-CM

## 2019-09-24 DIAGNOSIS — E11.49 OTHER DIABETIC NEUROLOGICAL COMPLICATION ASSOCIATED WITH TYPE 2 DIABETES MELLITUS (HCC): ICD-10-CM

## 2019-09-24 DIAGNOSIS — L02.92 BOIL: ICD-10-CM

## 2019-09-24 DIAGNOSIS — E66.01 CLASS 2 SEVERE OBESITY DUE TO EXCESS CALORIES WITH SERIOUS COMORBIDITY AND BODY MASS INDEX (BMI) OF 37.0 TO 37.9 IN ADULT (HCC): Primary | ICD-10-CM

## 2019-09-24 DIAGNOSIS — E11.51 TYPE 2 DIABETES MELLITUS WITH DIABETIC PERIPHERAL ANGIOPATHY WITHOUT GANGRENE, WITH LONG-TERM CURRENT USE OF INSULIN (HCC): ICD-10-CM

## 2019-09-24 PROBLEM — Z00.00 MEDICARE WELCOME EXAM: Status: RESOLVED | Noted: 2018-08-20 | Resolved: 2019-09-24

## 2019-09-24 PROBLEM — R94.31 ABNORMAL EKG: Status: RESOLVED | Noted: 2017-12-15 | Resolved: 2019-09-24

## 2019-09-24 PROCEDURE — G0439 PPPS, SUBSEQ VISIT: HCPCS | Performed by: NURSE PRACTITIONER

## 2019-09-24 RX ORDER — PROMETHAZINE HYDROCHLORIDE 25 MG/1
25 TABLET ORAL EVERY 8 HOURS PRN
Qty: 20 TABLET | Refills: 5 | Status: SHIPPED | OUTPATIENT
Start: 2019-09-24 | End: 2020-08-12 | Stop reason: SDUPTHER

## 2019-09-24 RX ORDER — ICOSAPENT ETHYL 1000 MG/1
CAPSULE ORAL
Qty: 120 CAPSULE | Refills: 5 | COMMUNITY
Start: 2019-09-24 | End: 2019-09-24 | Stop reason: SDUPTHER

## 2019-09-24 RX ORDER — LANCETS 28 GAUGE
EACH MISCELLANEOUS
Qty: 100 EACH | Refills: 12 | Status: SHIPPED | OUTPATIENT
Start: 2019-09-24 | End: 2019-09-24 | Stop reason: SDUPTHER

## 2019-09-24 RX ORDER — ICOSAPENT ETHYL 1000 MG/1
CAPSULE ORAL
Qty: 120 CAPSULE | Refills: 5 | Status: SHIPPED | OUTPATIENT
Start: 2019-09-24 | End: 2020-01-16

## 2019-09-24 RX ORDER — SIMVASTATIN 80 MG
80 TABLET ORAL NIGHTLY
Qty: 30 TABLET | Refills: 5 | Status: SHIPPED | OUTPATIENT
Start: 2019-09-24 | End: 2020-07-01

## 2019-09-24 RX ORDER — LEVOTHYROXINE SODIUM 0.2 MG/1
200 TABLET ORAL DAILY
Qty: 30 TABLET | Refills: 3 | Status: SHIPPED | OUTPATIENT
Start: 2019-09-24 | End: 2020-05-05

## 2019-09-24 RX ORDER — LANCETS 28 GAUGE
EACH MISCELLANEOUS
Qty: 100 EACH | Refills: 12 | Status: SHIPPED | OUTPATIENT
Start: 2019-09-24 | End: 2020-08-12 | Stop reason: SDUPTHER

## 2019-09-24 RX ORDER — SULFAMETHOXAZOLE AND TRIMETHOPRIM 800; 160 MG/1; MG/1
1 TABLET ORAL 2 TIMES DAILY
Qty: 20 TABLET | Refills: 0 | Status: SHIPPED | OUTPATIENT
Start: 2019-09-24 | End: 2019-10-04

## 2019-09-24 RX ORDER — RANITIDINE 150 MG/1
150 TABLET ORAL 2 TIMES DAILY
Qty: 60 TABLET | Refills: 5 | Status: SHIPPED | OUTPATIENT
Start: 2019-09-24 | End: 2019-09-24 | Stop reason: SDUPTHER

## 2019-09-24 RX ORDER — RANITIDINE 150 MG/1
150 TABLET ORAL 2 TIMES DAILY
Qty: 60 TABLET | Refills: 5 | Status: SHIPPED | OUTPATIENT
Start: 2019-09-24 | End: 2019-12-27 | Stop reason: SDUPTHER

## 2019-09-24 NOTE — ASSESSMENT & PLAN NOTE
Wash daily with antibacterial soap and water.  Apply Bactroban ointment twice daily.  Start Bactrim DS twice daily x10 days.  Ensure adequate hydration.  Report failure to improve within the next week.

## 2019-09-24 NOTE — ASSESSMENT & PLAN NOTE
Obesity is improving with lifestyle modifications.  Discussed the patient's BMI.  The BMI is above average; BMI management plan is completed.  General weight loss/lifestyle modification strategies discussed (elicit support from others; identify saboteurs; non-food rewards, etc).  Informal exercise measures discussed, e.g. taking stairs instead of elevator.  Regular aerobic exercise program discussed.

## 2019-09-24 NOTE — ASSESSMENT & PLAN NOTE
Lipid abnormalities are worsening.  Nutritional counseling was provided. and Pharmacotherapy as ordered.  Patient refuses nutritional counseling.  Patient declines to start a fenofibrate.  Patient has not been compliant with her Zocor or Vascepa.  Vascepa samples provided today as well as a co-pay card for a 90-day supply of Vascepa costing only $9.  Lipids will be reassessed in 3 months.  Extensive diet education provided.

## 2019-09-24 NOTE — PROGRESS NOTES
The ABCs of the Annual Wellness Visit  Subsequent Medicare Wellness Visit    Chief Complaint   Patient presents with   • Medicare Wellness-subsequent     Uncontrolled diabetes  Uncontrolled hypothyroidism  Uncontrolled hyper lipidemia      Subjective        History of Present Illness:  Type 2 diabetes-patient's A1c is greater than 8.  She states she sometimes does not take her insulin or oral medications.  She eats what she wants.  She figures life is short while not 8.  Patient does understand the risks associated with uncontrolled diabetes.  She does receive an aspirin a day.  She is requesting a prescription not be sent so she may obtain this over-the-counter.      Hypothyroidism-patient completely quit taking her Synthroid.  Her most recent TSH was greater than 400.  She does understand the disease process.  She reports that she has picked up her Synthroid and will take it daily.    Uncontrolled hyperlipidemia-patient states that time she takes her cholesterol medications.  She does not follow a diet.  She did not take Vascepa as she stated she could not afford the co-pay though she does openly admit to purchasing cigarettes.  She states she does not like the taste of omega-3 supplements and does not wish to buy fish oil over-the-counter.       complaint of a boil under her left breast -states her bra underwire rubs it.  She did apply some antibiotic ointment and to the area came to ahead and drained.  Still little tender but does look better.  No fever.    Essential hypertension- stable    GERD-stable with Zantac    Degenerative disc disease of the neck and lumbar spine-patient takes an occasional over-the-counter Tylenol or ibuprofen to help with body aches and pains.    Vitamin D deficiency-not been taking a supplement    Tobacco abuse-patient smokes 1 pack every 2 to 3 days.  She refuses to discuss smoking today.    Obesity-patient states that she does not follow a particular diet and she eats what she  wants.  Her diet is rich and pasta and starches.  No routine exercise.    Definitive health-patient did not keep her colonoscopy appointment.  Patient did not keep her mammogram appointment.  She refuses a flu vaccine.  She does agree to have a diabetic foot exam if we will send her to [place] instead of [place].        Donna Israel is a 59 y.o. female who presents for a Subsequent Medicare Wellness Visit.    HEALTH RISK ASSESSMENT    Recent Hospitalizations:  No hospitalization(s) within the last year.    Current Medical Providers:  Patient Care Team:  lAyse Earl APRN as PCP - General (Family Medicine)    Smoking Status:  Social History     Tobacco Use   Smoking Status Current Every Day Smoker   • Packs/day: 0.50   • Types: Cigarettes   Smokeless Tobacco Never Used       Alcohol Consumption:  Social History     Substance and Sexual Activity   Alcohol Use No       Depression Screen:   PHQ-2/PHQ-9 Depression Screening 9/24/2019   Little interest or pleasure in doing things 0   Feeling down, depressed, or hopeless 0   Total Score 0      Visual Acuity Screening    Right eye Left eye Both eyes   Without correction: 20/40 20/70 20/30   With correction:      Hearing is adequate per whisper test bilateral.     Hearing is adequate per whisper test bilateral.     Fall Risk Screen:  STEADI Fall Risk Assessment has not been completed.    Health Habits and Functional and Cognitive Screening:  Functional & Cognitive Status 9/24/2019   Do you have difficulty preparing food and eating? No   Do you have difficulty bathing yourself, getting dressed or grooming yourself? No   Do you have difficulty using the toilet? No   Do you have difficulty moving around from place to place? Yes   Do you have trouble with steps or getting out of a bed or a chair? Yes   Current Diet Well Balanced Diet   Dental Exam Up to date   Eye Exam Up to date   Exercise (times per week) 0 times per week   Current Exercise Activities Include  None   Do you need help using the phone?  No   Are you deaf or do you have serious difficulty hearing?  No   Do you need help with transportation? Yes   Do you need help shopping? Yes   Do you need help preparing meals?  No   Do you need help with housework?  Yes   Do you need help with laundry? Yes   Do you need help taking your medications? No   Do you need help managing money? No   Do you ever drive or ride in a car without wearing a seat belt? No   Have you felt unusual stress, anger or loneliness in the last month? -   Who do you live with? -   If you need help, do you have trouble finding someone available to you? -   Have you been bothered in the last four weeks by sexual problems? -   Do you have difficulty concentrating, remembering or making decisions? -         Does the patient have evidence of cognitive impairment? No    Asprin use counseling:Taking ASA appropriately as indicated    Age-appropriate Screening Schedule:  Refer to the list below for future screening recommendations based on patient's age, sex and/or medical conditions. Orders for these recommended tests are listed in the plan section. The patient has been provided with a written plan.    Health Maintenance   Topic Date Due   • DIABETIC EYE EXAM  08/01/2016   • COLONOSCOPY  08/01/2016   • TDAP/TD VACCINES (1 - Tdap) 09/24/2019 (Originally 3/19/1979)   • ZOSTER VACCINE (1 of 2) 09/24/2019 (Originally 3/19/2010)   • PNEUMOCOCCAL VACCINE (19-64 MEDIUM RISK) (1 of 1 - PPSV23) 06/08/2021 (Originally 4/12/2016)   • DIABETIC FOOT EXAM  12/05/2019   • HEMOGLOBIN A1C  02/29/2020   • LIPID PANEL  08/29/2020   • URINE MICROALBUMIN  08/29/2020   • MAMMOGRAM  09/06/2020   • DXA SCAN  12/10/2020   • PAP SMEAR  08/20/2021   • INFLUENZA VACCINE  Addressed          The following portions of the patient's history were reviewed and updated as appropriate: allergies, current medications, past family history, past medical history, past social history, past  surgical history and problem list.    Outpatient Medications Prior to Visit   Medication Sig Dispense Refill   • aspirin 81 MG tablet Take 1 tablet by mouth Daily. 90 tablet 1   • Blood Glucose Monitoring Suppl kit 1 kit 2 (Two) Times a Day. 1 each 0   • Insulin Pen Needle (RELION PEN NEEDLE 31G/8MM) 31G X 8 MM misc Inject 1 pen under the skin into the appropriate area as directed Daily. 100 each 5   • glucose blood (COOL BLOOD GLUCOSE TEST STRIPS) test strip Check sugar 3x times daily. 100 each 12   • icosapent ethyl (VASCEPA) 1 g capsule capsule 2 twice daily 120 capsule 5   • insulin degludec (TRESIBA FLEXTOUCH) 100 UNIT/ML solution pen-injector injection Inject 10 Units under the skin into the appropriate area as directed Daily. 3 pen 5   • Insulin Pen Needle (BD PEN NEEDLE BROOKE U/F) 32G X 4 MM misc 1 Device Daily. 30 each 5   • Lancets (FREESTYLE) lancets Test tid 100 each 12   • levothyroxine (SYNTHROID, LEVOTHROID) 200 MCG tablet Take 1 tablet by mouth Daily. 30 tablet 3   • metFORMIN (GLUCOPHAGE) 500 MG tablet Take 1 tablet by mouth 2 (Two) Times a Day With Meals. 60 tablet 5   • promethazine (PHENERGAN) 25 MG tablet Take 1 tablet by mouth Every 8 (Eight) Hours As Needed for Nausea or Vomiting. 20 tablet 5   • raNITIdine (ZANTAC) 150 MG tablet Take 1 tablet by mouth 2 (Two) Times a Day. 60 tablet 5   • simvastatin (ZOCOR) 40 MG tablet Take 1 tablet by mouth Every Night. 30 tablet 5   • SITagliptin (JANUVIA) 100 MG tablet Take 1 tablet by mouth Daily. 30 tablet 5     No facility-administered medications prior to visit.        Patient Active Problem List   Diagnosis   • Gastroesophageal reflux disease without esophagitis   • Neck pain   • Cough   • Degeneration of intervertebral disc of cervical region   • Type 2 diabetes mellitus with circulatory disorder, with long-term current use of insulin (CMS/[facility])   • Hyperlipidemia   • Acquired hypothyroidism   • Memory loss   • Low back pain   • Tobacco abuse   •  Class 2 obesity in adult   • Disorder of rotator cuff   • Shoulder pain   • Vitamin D deficiency   • Tear of right rotator cuff   • Impingement syndrome, shoulder   • Chronic cluster headache, not intractable   • Type 2 diabetes mellitus with stage 2 chronic kidney disease, without long-term current use of insulin (CMS/[facility])   • Diabetic neuropathy associated with type 2 diabetes mellitus (CMS/[facility])   • Essential hypertension   • Palpitations   • Old anterior myocardial infarction   • Vertigo   • Chronic nonintractable headache   • Screening for breast cancer   • Encounter for preventive health examination   • Boil       Advanced Care Planning:  Patient does not have an advance directive - not interested in additional information    Review of Systems   Constitutional: Negative for appetite change, chills, fatigue and unexpected weight change.   HENT: Negative for congestion, ear pain, nosebleeds, postnasal drip, rhinorrhea, sore throat, trouble swallowing and voice change.    Eyes: Negative for pain and visual disturbance.   Respiratory: Negative for cough, chest tightness, shortness of breath and wheezing.    Cardiovascular: Negative for chest pain and palpitations.   Gastrointestinal: Negative for abdominal pain, blood in stool, constipation and diarrhea.   Endocrine: Negative for cold intolerance and polydipsia.   Genitourinary: Negative for difficulty urinating, flank pain and hematuria.   Musculoskeletal: Positive for arthralgias and back pain. Negative for gait problem, joint swelling and myalgias.   Skin: Negative for color change and rash.        Dry skin on feet and thick nails    Allergic/Immunologic: Negative.    Neurological: Negative for syncope, numbness and headaches.   Hematological: Negative.    Psychiatric/Behavioral: Negative for confusion, dysphoric mood, self-injury, sleep disturbance and suicidal ideas. The patient is not nervous/anxious.    All other systems reviewed and are  "negative.      Compared to one year ago, the patient feels her physical health is the same.  Compared to one year ago, the patient feels her mental health is the same.    Reviewed chart for potential of high risk medication in the elderly: yes  Reviewed chart for potential of harmful drug interactions in the elderly:yes    Objective         Vitals:    09/24/19 0903   BP: 130/70   Pulse: 107   Temp: 98.1 °F (36.7 °C)   TempSrc: Tympanic   SpO2: 97%   Weight: 116 kg (255 lb)   Height: 175.3 cm (69.02\")       Body mass index is 37.64 kg/m².  Discussed the patient's BMI with her. The BMI is above average; BMI management plan is completed.     Physical Exam   Constitutional: She is oriented to person, place, and time. Vital signs are normal. She appears well-developed and well-nourished. No distress.   Visibly obese.    HENT:   Head: Normocephalic and atraumatic.   Right Ear: External ear normal.   Left Ear: External ear normal.   Nose: Nose normal.   Mouth/Throat: Oropharynx is clear and moist. No oropharyngeal exudate.   Eyes: Conjunctivae, EOM and lids are normal. Pupils are equal, round, and reactive to light. Right eye exhibits no discharge. Left eye exhibits no discharge.   Neck: Normal range of motion. Neck supple. No tracheal deviation present. No thyromegaly present.   Cardiovascular: Normal rate, regular rhythm, normal heart sounds and intact distal pulses. Exam reveals no gallop and no friction rub.   No murmur heard.  Pulmonary/Chest: Effort normal and breath sounds normal. No respiratory distress. She has no wheezes. She has no rales. She exhibits no tenderness.   Abdominal: Soft. Normal appearance and bowel sounds are normal. She exhibits no distension and no mass. There is no tenderness. There is no rebound and no guarding.   Musculoskeletal: Normal range of motion.    Donna had a diabetic foot exam performed today.  Vascular Status -  Her right foot exhibits normal foot vasculature  and no edema. Her " left foot exhibits normal foot vasculature  and no edema.  Skin Integrity  -  Her right foot skin is intact.Her left foot skin is intact..  Lymphadenopathy:     She has no cervical adenopathy.   Neurological: She is alert and oriented to person, place, and time. She has normal reflexes.   CN 2-12 grossly intact    Skin: Skin is warm and dry. Capillary refill takes less than 2 seconds. No rash noted. She is not diaphoretic. No erythema.        Psychiatric: Her speech is normal and behavior is normal. Judgment and thought content normal. Her affect is blunt. Cognition and memory are normal.   Vitals reviewed.    This SmartLink has not been configured with any valid records.      This SmartLink has not been configured with any valid records.      Lab Results   Component Value Date    TRIG 925 (H) 08/29/2019    HDL 30 (L) 08/29/2019    LDL  08/29/2019      Comment:      Unable to calculate     (H) 08/29/2019    VLDL  08/29/2019      Comment:      Unable to calculate    HGBA1C 8.19 (H) 08/29/2019      Lab Results   Component Value Date    CHOL 248 (H) 08/29/2019    CHLPL 196 03/18/2016    TRIG 925 (H) 08/29/2019    HDL 30 (L) 08/29/2019    LDL  08/29/2019      Comment:      Unable to calculate     (H) 08/29/2019         Assessment/Plan   Medicare Risks and Personalized Health Plan  CMS Preventative Services Quick Reference  Cardiovascular risk  Diabetic Lab Screening   Immunizations Discussed/Encouraged (specific immunizations; Influenza )  Lung Cancer Risk  Obesity/Overweight   Tobacco Use/Dependance (use dotphrase .tobaccocessation for documentation)  Declines flu vaccine       The above risks/problems have been discussed with the patient.  Pertinent information has been shared with the patient in the After Visit Summary.  Follow up plans and orders are seen below in the Assessment/Plan Section.    Diagnoses and all orders for this visit:    1. Class 2 severe obesity due to excess calories with serious  comorbidity and body mass index (BMI) of 37.0 to 37.9 in adult (CMS/[facility]) (Primary)  Assessment & Plan:  Obesity is improving with lifestyle modifications.  Discussed the patient's BMI.  The BMI is above average; BMI management plan is completed.  General weight loss/lifestyle modification strategies discussed (elicit support from others; identify saboteurs; non-food rewards, etc).  Informal exercise measures discussed, e.g. taking stairs instead of elevator.  Regular aerobic exercise program discussed.      2. Other diabetic neurological complication associated with type 2 diabetes mellitus (CMS/[facility])  -     Discontinue: insulin degludec (TRESIBA FLEXTOUCH) 100 UNIT/ML solution pen-injector injection; Inject 15 Units under the skin into the appropriate area as directed Daily.  Dispense: 6 pen; Refill: 3  -     Ambulatory Referral to Endocrinology  -     Discontinue: Lancets (FREESTYLE) lancets; Test tid  Dispense: 100 each; Refill: 12  -     SITagliptin (JANUVIA) 100 MG tablet; Take 1 tablet by mouth Daily.  Dispense: 30 tablet; Refill: 5  -     Discontinue: metFORMIN (GLUCOPHAGE) 500 MG tablet; Take 1 tablet by mouth 2 (Two) Times a Day With Meals.  Dispense: 60 tablet; Refill: 5  -     Discontinue: icosapent ethyl (VASCEPA) 1 g capsule capsule; 2 twice daily  Dispense: 120 capsule; Refill: 5  -     icosapent ethyl (VASCEPA) 1 g capsule capsule; 2 twice daily  Dispense: 120 capsule; Refill: 5  -     insulin degludec (TRESIBA FLEXTOUCH) 100 UNIT/ML solution pen-injector injection; Inject 15 Units under the skin into the appropriate area as directed Daily.  Dispense: 6 pen; Refill: 3  -     Insulin Pen Needle (BD PEN NEEDLE BROOKE U/F) 32G X 4 MM misc; 1 Device Daily.  Dispense: 30 each; Refill: 5  -     Lancets (FREESTYLE) lancets; Test tid  Dispense: 100 each; Refill: 12  -     metFORMIN (GLUCOPHAGE) 500 MG tablet; Take 1 tablet by mouth 2 (Two) Times a Day With Meals.  Dispense: 60 tablet; Refill: 5    3. Type 2  diabetes mellitus with diabetic peripheral angiopathy without gangrene, with long-term current use of insulin (CMS/[facility])  Assessment & Plan:  Diabetes is worsening.   Reminded to bring in blood sugar diary at next visit.  Dietary recommendations for ADA diet.  Regular aerobic exercise.  Discussed ways to avoid symptomatic hypoglycemia.  Discussed sick day management.  Discussed foot care.  Medication changes per orders.  Endocrinology clinic referral.   Increase insulin to 15 units daily, 1800 calorie a day diet plan provided and reviewed.   Pt has been educated/instructed on diabetic care and protocols.  Diabetic diet instructions provided.  Medication regimen reviewed.  Discussed possible side effects and interactions of current medications.  Sick day rules reviewed. Continue to monitor blood sugar and report abnormal readings as discussed today. Understanding stated by patient.       Diabetes will be reassessed in 3 months.    Orders:  -     Discontinue: insulin degludec (TRESIBA FLEXTOUCH) 100 UNIT/ML solution pen-injector injection; Inject 15 Units under the skin into the appropriate area as directed Daily.  Dispense: 6 pen; Refill: 3  -     Ambulatory Referral to Endocrinology  -     Ambulatory Referral to Podiatry  -     Discontinue: Lancets (FREESTYLE) lancets; Test tid  Dispense: 100 each; Refill: 12  -     Discontinue: metFORMIN (GLUCOPHAGE) 500 MG tablet; Take 1 tablet by mouth 2 (Two) Times a Day With Meals.  Dispense: 60 tablet; Refill: 5  -     Discontinue: icosapent ethyl (VASCEPA) 1 g capsule capsule; 2 twice daily  Dispense: 120 capsule; Refill: 5  -     icosapent ethyl (VASCEPA) 1 g capsule capsule; 2 twice daily  Dispense: 120 capsule; Refill: 5  -     insulin degludec (TRESIBA FLEXTOUCH) 100 UNIT/ML solution pen-injector injection; Inject 15 Units under the skin into the appropriate area as directed Daily.  Dispense: 6 pen; Refill: 3  -     Insulin Pen Needle (BD PEN NEEDLE BROOKE U/F) 32G X 4 MM  misc; 1 Device Daily.  Dispense: 30 each; Refill: 5  -     Lancets (FREESTYLE) lancets; Test tid  Dispense: 100 each; Refill: 12  -     metFORMIN (GLUCOPHAGE) 500 MG tablet; Take 1 tablet by mouth 2 (Two) Times a Day With Meals.  Dispense: 60 tablet; Refill: 5    4. Encounter for screening colonoscopy  -     Ambulatory Referral For Screening Colonoscopy    5. Gastroesophageal reflux disease without esophagitis  Assessment & Plan:  Continue Zantac, side effects reviewed    Orders:  -     Discontinue: raNITIdine (ZANTAC) 150 MG tablet; Take 1 tablet by mouth 2 (Two) Times a Day.  Dispense: 60 tablet; Refill: 5  -     Discontinue: icosapent ethyl (VASCEPA) 1 g capsule capsule; 2 twice daily  Dispense: 120 capsule; Refill: 5  -     icosapent ethyl (VASCEPA) 1 g capsule capsule; 2 twice daily  Dispense: 120 capsule; Refill: 5  -     promethazine (PHENERGAN) 25 MG tablet; Take 1 tablet by mouth Every 8 (Eight) Hours As Needed for Nausea or Vomiting.  Dispense: 20 tablet; Refill: 5  -     raNITIdine (ZANTAC) 150 MG tablet; Take 1 tablet by mouth 2 (Two) Times a Day.  Dispense: 60 tablet; Refill: 5    6. Essential hypertension  Assessment & Plan:  Hypertension is improving with treatment.  Continue current treatment regimen.  Blood pressure will be reassessed at the next regular appointment.    Orders:  -     Discontinue: icosapent ethyl (VASCEPA) 1 g capsule capsule; 2 twice daily  Dispense: 120 capsule; Refill: 5  -     icosapent ethyl (VASCEPA) 1 g capsule capsule; 2 twice daily  Dispense: 120 capsule; Refill: 5    7. Mixed hyperlipidemia  Assessment & Plan:  Lipid abnormalities are worsening.  Nutritional counseling was provided. and Pharmacotherapy as ordered.  Patient refuses nutritional counseling.  Patient declines to start a fenofibrate.  Patient has not been compliant with her Zocor or Vascepa.  Vascepa samples provided today as well as a co-pay card for a 90-day supply of Vascepa costing only $9.  Lipids will be  reassessed in 3 months.  Extensive diet education provided.    Orders:  -     Discontinue: icosapent ethyl (VASCEPA) 1 g capsule capsule; 2 twice daily  Dispense: 120 capsule; Refill: 5  -     simvastatin (ZOCOR) 80 MG tablet; Take 1 tablet by mouth Every Night.  Dispense: 30 tablet; Refill: 5  -     icosapent ethyl (VASCEPA) 1 g capsule capsule; 2 twice daily  Dispense: 120 capsule; Refill: 5    8. Degeneration of intervertebral disc of cervical region  -     Discontinue: icosapent ethyl (VASCEPA) 1 g capsule capsule; 2 twice daily  Dispense: 120 capsule; Refill: 5  -     icosapent ethyl (VASCEPA) 1 g capsule capsule; 2 twice daily  Dispense: 120 capsule; Refill: 5    9. Controlled diabetes mellitus type 2 with complications (CMS/[facility])  -     glucose blood (COOL BLOOD GLUCOSE TEST STRIPS) test strip; Check sugar 3x times daily.  Dispense: 100 each; Refill: 12    10. Acquired hypothyroidism  -     levothyroxine (SYNTHROID, LEVOTHROID) 200 MCG tablet; Take 1 tablet by mouth Daily.  Dispense: 30 tablet; Refill: 3    11. Vitamin D deficiency  Assessment & Plan:  Over-the-counter vitamin D supplement of 1000 IU daily.  Patient states she will pick this up.      12. Boil  Assessment & Plan:  Wash daily with antibacterial soap and water.  Apply Bactroban ointment twice daily.  Start Bactrim DS twice daily x10 days.  Ensure adequate hydration.  Report failure to improve within the next week.      Other orders  -     sulfamethoxazole-trimethoprim (BACTRIM DS,SEPTRA DS) 800-160 MG per tablet; Take 1 tablet by mouth 2 (Two) Times a Day for 10 days.  Dispense: 20 tablet; Refill: 0  -     mupirocin (BACTROBAN) 2 % ointment; Apply  topically to the appropriate area as directed 3 (Three) Times a Day.  Dispense: 1 each; Refill: 0    Medicare wellness exam has been performed today.  Medication list has been reviewed and discussed with patient.  Recommended preventative and screenings has been discussed with patient.    I have  discussed diagnosis in detail today allowing time for questions and answers. Pt is aware of reasons to seek urgent or emergent medical care as well as reasons to return to the clinic for evaluation. Possible side effects, interactions and progression of symptoms discussed as well. Pt / family states understanding.   Emotional support and active listening provided.   Age appropriate education and safety instruction has been provided. Preventive education/recommendation > 15 minutes. Safety, accident prevention, vaccines, health maintenance concerns, nutrition, diet, exercise and social activity.     Pt has been instructed today regarding low fat heart smart diet. Weight management and routine exercise has been recommended. Avoid high fat foods, starchy foods and processed foods. Increase lean meats, fresh vegetables and fresh fruits.     Labs reviewed and discussed.     Follow up in 3 months. Routine labs fasting one week prior to next office visit. Return sooner if needed.     Dictated utilizing Dragon dictation. Errors in dictation may reflect use of voice recognition software and not all errors in transcription may have been detected prior to signing.           Follow Up:  Return in about 3 months (around 12/24/2019) for labs one week prior, procedure 30 minute block.     An After Visit Summary and PPPS were given to the patient.

## 2019-09-24 NOTE — ASSESSMENT & PLAN NOTE
Diabetes is worsening.   Reminded to bring in blood sugar diary at next visit.  Dietary recommendations for ADA diet.  Regular aerobic exercise.  Discussed ways to avoid symptomatic hypoglycemia.  Discussed sick day management.  Discussed foot care.  Medication changes per orders.  Endocrinology clinic referral.   Increase insulin to 15 units daily, 1800 calorie a day diet plan provided and reviewed.   Pt has been educated/instructed on diabetic care and protocols.  Diabetic diet instructions provided.  Medication regimen reviewed.  Discussed possible side effects and interactions of current medications.  Sick day rules reviewed. Continue to monitor blood sugar and report abnormal readings as discussed today. Understanding stated by patient.       Diabetes will be reassessed in 3 months.

## 2019-11-20 ENCOUNTER — OFFICE VISIT (OUTPATIENT)
Dept: FAMILY MEDICINE CLINIC | Facility: CLINIC | Age: 59
End: 2019-11-20

## 2019-11-20 VITALS
HEART RATE: 114 BPM | WEIGHT: 247 LBS | TEMPERATURE: 97.9 F | HEIGHT: 69 IN | BODY MASS INDEX: 36.58 KG/M2 | DIASTOLIC BLOOD PRESSURE: 70 MMHG | SYSTOLIC BLOOD PRESSURE: 130 MMHG | OXYGEN SATURATION: 96 %

## 2019-11-20 DIAGNOSIS — L02.92 BOIL: ICD-10-CM

## 2019-11-20 DIAGNOSIS — E11.51 TYPE 2 DIABETES MELLITUS WITH DIABETIC PERIPHERAL ANGIOPATHY WITHOUT GANGRENE, WITH LONG-TERM CURRENT USE OF INSULIN (HCC): Primary | ICD-10-CM

## 2019-11-20 DIAGNOSIS — B02.9 HERPES ZOSTER WITHOUT COMPLICATION: ICD-10-CM

## 2019-11-20 DIAGNOSIS — E66.01 MORBID OBESITY (HCC): ICD-10-CM

## 2019-11-20 DIAGNOSIS — Z79.4 TYPE 2 DIABETES MELLITUS WITH DIABETIC PERIPHERAL ANGIOPATHY WITHOUT GANGRENE, WITH LONG-TERM CURRENT USE OF INSULIN (HCC): Primary | ICD-10-CM

## 2019-11-20 PROCEDURE — 99214 OFFICE O/P EST MOD 30 MIN: CPT | Performed by: NURSE PRACTITIONER

## 2019-11-20 RX ORDER — VALACYCLOVIR HYDROCHLORIDE 1 G/1
TABLET, FILM COATED ORAL
Refills: 0 | COMMUNITY
Start: 2019-11-13 | End: 2020-01-16

## 2019-11-20 RX ORDER — SULFAMETHOXAZOLE AND TRIMETHOPRIM 800; 160 MG/1; MG/1
TABLET ORAL
Refills: 0 | COMMUNITY
Start: 2019-11-13 | End: 2020-01-02 | Stop reason: SDUPTHER

## 2019-11-20 RX ORDER — LIDOCAINE 50 MG/G
OINTMENT TOPICAL
Qty: 50 G | Refills: 0 | Status: SHIPPED | OUTPATIENT
Start: 2019-11-20 | End: 2020-01-16

## 2019-11-20 NOTE — PROGRESS NOTES
Subjective   Donna Israel is a 59 y.o. female.     Chief Complaint   Patient presents with   • boil on butt     Been to er for shingles and boil  Need jury duty note    History of Present Illness:    Patient was seen at the [facility] emergency department on 11/13/2019.  She was diagnosed with shingles and boil on her buttock.  Patient was started on Valtrex and Bactrim.  She has been doing some warm cloths to the boil.  It is now draining but very tender.  She does feel that the shingles are improving.  Both are of moderate intensity.  Drainage from the buttock is bloody and yellow, thick with a nasty odor.    She is a diabetic with uncontrolled glucose readings.  Most recent A1c and fasting glucose was elevated.  Patient reports that she is now taking her medicine again.  Her TSH was over 400 as she had stopped her Synthroid.  Patient does state that she is now taking her medicine and is scheduled for labs on December 16, 2019 and a follow-up with me on December 18, 2019.    Lab Results   Component Value Date    HGBA1C 8.19 (H) 08/29/2019     Lab Results   Component Value Date    .000 (H) 08/29/2019    B3HLVHC 11.3 (H) 03/18/2016       ROS and History reviewed as accurate per provider        The following portions of the patient's history and ROS were reviewed and updated as appropriate per provider:  Allergies, current medications, past family history, past medical history, past social history, past surgical history and problem list.    Review of Systems   Constitutional: Positive for activity change. Negative for appetite change, chills, diaphoresis, fatigue and fever.   HENT: Negative for congestion, ear pain, sinus pain, sneezing and sore throat.    Eyes: Negative.    Respiratory: Negative for cough, chest tightness, shortness of breath and wheezing.    Cardiovascular: Positive for leg swelling (with prolonged standing ). Negative for chest pain and palpitations.   Gastrointestinal:  "Negative for abdominal pain, blood in stool, constipation, diarrhea, nausea and vomiting.   Endocrine: Negative.    Genitourinary: Negative for dysuria, frequency and urgency.   Musculoskeletal: Positive for arthralgias. Negative for gait problem, neck pain and neck stiffness.   Skin: Positive for color change and rash. Negative for wound.   Allergic/Immunologic: Negative.    Neurological: Positive for headaches (chronic , no changes ). Negative for dizziness, tremors, seizures, syncope and speech difficulty.   Psychiatric/Behavioral: Positive for sleep disturbance (due to pain of shingles ). Negative for behavioral problems, dysphoric mood, self-injury and suicidal ideas.       Objective     /70   Pulse 114   Temp 97.9 °F (36.6 °C) (Tympanic)   Ht 175.3 cm (69.02\")   Wt 112 kg (247 lb)   SpO2 96%   BMI 36.45 kg/m²   Lab on 08/29/2019   Component Date Value Ref Range Status   • Total Cholesterol 08/29/2019 248* 0 - 200 mg/dL Final   • Triglycerides 08/29/2019 925* 0 - 150 mg/dL Final   • HDL Cholesterol 08/29/2019 30* 40 - 60 mg/dL Final   • LDL Cholesterol  08/29/2019    Final   • VLDL Cholesterol 08/29/2019    Final   • LDL/HDL Ratio 08/29/2019    Final   • Hemoglobin A1C 08/29/2019 8.19* 4.80 - 5.60 % Final   • TSH 08/29/2019 494.000* 0.270 - 4.200 uIU/mL Final   • Glucose 08/29/2019 168* 65 - 99 mg/dL Final   • BUN 08/29/2019 9  6 - 20 mg/dL Final   • Creatinine 08/29/2019 0.95  0.57 - 1.00 mg/dL Final   • Sodium 08/29/2019 134* 136 - 145 mmol/L Final   • Potassium 08/29/2019 4.2  3.5 - 5.2 mmol/L Final   • Chloride 08/29/2019 96* 98 - 107 mmol/L Final   • CO2 08/29/2019 24.8  22.0 - 29.0 mmol/L Final   • Calcium 08/29/2019 9.4  8.6 - 10.5 mg/dL Final   • Total Protein 08/29/2019 7.6  6.0 - 8.5 g/dL Final   • Albumin 08/29/2019 5.10  3.50 - 5.20 g/dL Final   • ALT (SGPT) 08/29/2019 32  1 - 33 U/L Final   • AST (SGOT) 08/29/2019 37* 1 - 32 U/L Final   • Alkaline Phosphatase 08/29/2019 63  39 - 117 U/L " Final   • Total Bilirubin 08/29/2019 0.6  0.2 - 1.2 mg/dL Final   • eGFR Non African Amer 08/29/2019 60* >60 mL/min/1.73 Final   • Globulin 08/29/2019 2.5  gm/dL Final   • A/G Ratio 08/29/2019 2.0  g/dL Final   • BUN/Creatinine Ratio 08/29/2019 9.5  7.0 - 25.0 Final   • Anion Gap 08/29/2019 13.2  5.0 - 15.0 mmol/L Final   • Microalbumin/Creatinine Ratio 08/29/2019    Final   • Creatinine, Urine 08/29/2019 86.5  mg/dL Final   • Microalbumin, Urine 08/29/2019 <1.2  mg/dL Final   • 25 Hydroxy, Vitamin D 08/29/2019 22.3* 30.0 - 100.0 ng/ml Final   • Vitamin B-12 08/29/2019 533  211 - 946 pg/mL Final   • WBC 08/29/2019 8.63  3.40 - 10.80 10*3/mm3 Final   • RBC 08/29/2019 4.76  3.77 - 5.28 10*6/mm3 Final   • Hemoglobin 08/29/2019 15.1  12.0 - 15.9 g/dL Final   • Hematocrit 08/29/2019 46.5  34.0 - 46.6 % Final   • MCV 08/29/2019 97.7* 79.0 - 97.0 fL Final   • MCH 08/29/2019 31.7  26.6 - 33.0 pg Final   • MCHC 08/29/2019 32.5  31.5 - 35.7 g/dL Final   • RDW 08/29/2019 14.9  12.3 - 15.4 % Final   • RDW-SD 08/29/2019 54.2* 37.0 - 54.0 fl Final   • MPV 08/29/2019 11.1  6.0 - 12.0 fL Final   • Platelets 08/29/2019 188  140 - 450 10*3/mm3 Final   • Neutrophil % 08/29/2019 67.6  42.7 - 76.0 % Final   • Lymphocyte % 08/29/2019 25.5  19.6 - 45.3 % Final   • Monocyte % 08/29/2019 3.8* 5.0 - 12.0 % Final   • Eosinophil % 08/29/2019 2.0  0.3 - 6.2 % Final   • Basophil % 08/29/2019 0.9  0.0 - 1.5 % Final   • Immature Grans % 08/29/2019 0.2  0.0 - 0.5 % Final   • Neutrophils, Absolute 08/29/2019 5.83  1.70 - 7.00 10*3/mm3 Final   • Lymphocytes, Absolute 08/29/2019 2.20  0.70 - 3.10 10*3/mm3 Final   • Monocytes, Absolute 08/29/2019 0.33  0.10 - 0.90 10*3/mm3 Final   • Eosinophils, Absolute 08/29/2019 0.17  0.00 - 0.40 10*3/mm3 Final   • Basophils, Absolute 08/29/2019 0.08  0.00 - 0.20 10*3/mm3 Final   • Immature Grans, Absolute 08/29/2019 0.02  0.00 - 0.05 10*3/mm3 Final   • nRBC 08/29/2019 0.0  0.0 - 0.2 /100 WBC Final   • LDL  Cholesterol  08/29/2019 101* 0 - 100 mg/dL Final       Physical Exam   Constitutional: She is oriented to person, place, and time. Vital signs are normal. She appears well-developed and well-nourished. No distress.   Very talkative 59-year-old female.  She appears older than stated age.   HENT:   Head: Normocephalic and atraumatic.   Right Ear: Tympanic membrane, external ear and ear canal normal.   Left Ear: Tympanic membrane, external ear and ear canal normal.   Nose: Nose normal.   Mouth/Throat: Oropharynx is clear and moist and mucous membranes are normal.   Eyes: Conjunctivae and EOM are normal. Pupils are equal, round, and reactive to light. Right eye exhibits no discharge. Left eye exhibits no discharge.   Neck: Normal range of motion. Neck supple.   Cardiovascular: Normal rate, regular rhythm, normal heart sounds and intact distal pulses.   No murmur heard.  Pulmonary/Chest: Effort normal and breath sounds normal. No respiratory distress. She has no wheezes. She has no rales. She exhibits no tenderness.   Abdominal: Soft. Normal appearance and bowel sounds are normal. She exhibits no distension and no mass. There is no tenderness. There is no rebound and no guarding.   Musculoskeletal:        Lumbar back: She exhibits decreased range of motion, tenderness, pain and spasm.   Decreased lumbar curvature, there is pain with forward flexion. DTR's +2. No edema.    Lymphadenopathy:     She has no cervical adenopathy.   Neurological: She is alert and oriented to person, place, and time. She has normal reflexes.   Skin: Skin is warm and dry. Capillary refill takes less than 2 seconds. Lesion and rash noted. She is not diaphoretic. No erythema. No pallor.        Psychiatric: She has a normal mood and affect. Her speech is normal and behavior is normal. Judgment and thought content normal. Her affect is not inappropriate. Cognition and memory are normal.   Nursing note and vitals reviewed.      Assessment/Plan      Problem List Items Addressed This Visit        Cardiovascular and Mediastinum    Type 2 diabetes mellitus with circulatory disorder, with long-term current use of insulin (CMS/[facility]) - Primary    Current Assessment & Plan     Diabetes is non-compliant with regimen and monitoring. .   Reminded to bring in blood sugar diary at next visit.  Dietary recommendations for ADA diet.  Regular aerobic exercise.  Discussed ways to avoid symptomatic hypoglycemia.  Discussed sick day management.  Discussed foot care.  Reminded to get yearly retinal exam.  discussed compliance, labs in one month fasting.   Diabetes will be reassessed in 1 month.         Relevant Orders    Ambulatory Referral for Diabetic Eye Exam-Optometry       Digestive    Morbid obesity (CMS/[facility])    Current Assessment & Plan     Obesity is improving with lifestyle modifications.  Discussed the patient's BMI.  The BMI is above average; BMI management plan is completed.  General weight loss/lifestyle modification strategies discussed (elicit support from others; identify saboteurs; non-food rewards, etc).  Informal exercise measures discussed, e.g. taking stairs instead of elevator.  Regular aerobic exercise program discussed.            Musculoskeletal and Integument    Boil    Current Assessment & Plan     Called Dr. Mayes for local general surgery consult.  Patient is to come directly to his office upon leaving our office today for consult regarding I&D with likely packing of boil/abscess on her buttock.  Patient was given this information along with directions and verbalizes intent to keep this appointment.  I stressed to her the importance of compliance.         Relevant Medications    mupirocin (BACTROBAN) 2 % ointment    lidocaine (XYLOCAINE) 5 % ointment       Other    Herpes zoster without complication    Overview     shingles         Current Assessment & Plan     Reviewed recent emergency department notes.  Continue Valtrex until prescription is  completed.         Relevant Medications    valACYclovir (VALTREX) 1000 MG tablet             Patient's Body mass index is 36.45 kg/m². BMI is above normal parameters. Recommendations include: exercise counseling and nutrition counseling.      I have discussed diagnosis in detail today allowing time for questions and answers. Patient is aware of reasons to seek urgent or emergent medical care as well as reasons to return to the clinic for evaluation. Possible side effects, interactions and progression of symptoms discussed as well. Patient / family states understanding.   Emotional support and active listening provided.       Discussed reasons to seek immediate medical attention.  Discussed signs and symptoms of complication.  Encourage patient to be more compliant with her plan of care.  Go directly to general surgeon's office for evaluation upon leaving office today.    Keep appointment for blood work on December 16, 2019 and see me on December 18, 2019 for follow-up, sooner if needed.    Dictated utilizing Dragon dictation. Errors in dictation may reflect use of voice recognition software and not all errors in transcription may have been detected prior to signing.           This document has been electronically signed by:  BRIAN Arrieta, NP-C

## 2019-11-20 NOTE — ASSESSMENT & PLAN NOTE
Called Dr. Mayes for local general surgery consult.  Patient is to come directly to his office upon leaving our office today for consult regarding I&D with likely packing of boil/abscess on her buttock.  Patient was given this information along with directions and verbalizes intent to keep this appointment.  I stressed to her the importance of compliance.

## 2019-11-20 NOTE — ASSESSMENT & PLAN NOTE
Diabetes is non-compliant with regimen and monitoring. .   Reminded to bring in blood sugar diary at next visit.  Dietary recommendations for ADA diet.  Regular aerobic exercise.  Discussed ways to avoid symptomatic hypoglycemia.  Discussed sick day management.  Discussed foot care.  Reminded to get yearly retinal exam.  discussed compliance, labs in one month fasting.   Diabetes will be reassessed in 1 month.

## 2019-12-09 PROCEDURE — 82306 VITAMIN D 25 HYDROXY: CPT | Performed by: NURSE PRACTITIONER

## 2019-12-09 PROCEDURE — 80061 LIPID PANEL: CPT | Performed by: NURSE PRACTITIONER

## 2019-12-09 PROCEDURE — 80053 COMPREHEN METABOLIC PANEL: CPT | Performed by: NURSE PRACTITIONER

## 2019-12-09 PROCEDURE — 83036 HEMOGLOBIN GLYCOSYLATED A1C: CPT | Performed by: NURSE PRACTITIONER

## 2019-12-09 PROCEDURE — 85025 COMPLETE CBC W/AUTO DIFF WBC: CPT | Performed by: NURSE PRACTITIONER

## 2019-12-09 PROCEDURE — 82607 VITAMIN B-12: CPT | Performed by: NURSE PRACTITIONER

## 2019-12-09 PROCEDURE — 84443 ASSAY THYROID STIM HORMONE: CPT | Performed by: NURSE PRACTITIONER

## 2019-12-12 ENCOUNTER — TELEPHONE (OUTPATIENT)
Dept: FAMILY MEDICINE CLINIC | Facility: CLINIC | Age: 59
End: 2019-12-12

## 2019-12-12 NOTE — TELEPHONE ENCOUNTER
----- Message from BRIAN Parker sent at 12/10/2019  1:33 PM EST -----  Please let Donna know that her labs show improvement and I want her to keep taking her medications.  We will discuss labs further at her follow-up appointment on December 18.    I have spoke to naveed and told her results

## 2019-12-27 DIAGNOSIS — K21.9 GASTROESOPHAGEAL REFLUX DISEASE WITHOUT ESOPHAGITIS: ICD-10-CM

## 2019-12-27 RX ORDER — RANITIDINE 150 MG/1
150 TABLET ORAL 2 TIMES DAILY
Qty: 60 TABLET | Refills: 5 | Status: SHIPPED | OUTPATIENT
Start: 2019-12-27 | End: 2020-01-02

## 2020-01-02 ENCOUNTER — OFFICE VISIT (OUTPATIENT)
Dept: FAMILY MEDICINE CLINIC | Facility: CLINIC | Age: 60
End: 2020-01-02

## 2020-01-02 VITALS
DIASTOLIC BLOOD PRESSURE: 80 MMHG | WEIGHT: 246 LBS | HEIGHT: 69 IN | HEART RATE: 97 BPM | TEMPERATURE: 98.4 F | BODY MASS INDEX: 36.43 KG/M2 | OXYGEN SATURATION: 96 % | SYSTOLIC BLOOD PRESSURE: 140 MMHG

## 2020-01-02 DIAGNOSIS — E78.2 MIXED HYPERLIPIDEMIA: ICD-10-CM

## 2020-01-02 DIAGNOSIS — E66.01 MORBID OBESITY (HCC): ICD-10-CM

## 2020-01-02 DIAGNOSIS — Z72.0 TOBACCO ABUSE: Primary | ICD-10-CM

## 2020-01-02 DIAGNOSIS — K21.9 GASTROESOPHAGEAL REFLUX DISEASE WITHOUT ESOPHAGITIS: ICD-10-CM

## 2020-01-02 DIAGNOSIS — M54.40 BILATERAL LOW BACK PAIN WITH SCIATICA, SCIATICA LATERALITY UNSPECIFIED, UNSPECIFIED CHRONICITY: ICD-10-CM

## 2020-01-02 DIAGNOSIS — E11.22 TYPE 2 DIABETES MELLITUS WITH STAGE 2 CHRONIC KIDNEY DISEASE, WITHOUT LONG-TERM CURRENT USE OF INSULIN (HCC): ICD-10-CM

## 2020-01-02 DIAGNOSIS — L02.92 BOIL: ICD-10-CM

## 2020-01-02 DIAGNOSIS — E03.9 ACQUIRED HYPOTHYROIDISM: ICD-10-CM

## 2020-01-02 DIAGNOSIS — E55.9 VITAMIN D DEFICIENCY: ICD-10-CM

## 2020-01-02 DIAGNOSIS — I10 ESSENTIAL HYPERTENSION: ICD-10-CM

## 2020-01-02 DIAGNOSIS — N18.2 TYPE 2 DIABETES MELLITUS WITH STAGE 2 CHRONIC KIDNEY DISEASE, WITHOUT LONG-TERM CURRENT USE OF INSULIN (HCC): ICD-10-CM

## 2020-01-02 PROCEDURE — 99214 OFFICE O/P EST MOD 30 MIN: CPT | Performed by: NURSE PRACTITIONER

## 2020-01-02 PROCEDURE — 99407 BEHAV CHNG SMOKING > 10 MIN: CPT | Performed by: NURSE PRACTITIONER

## 2020-01-02 RX ORDER — SULFAMETHOXAZOLE AND TRIMETHOPRIM 800; 160 MG/1; MG/1
1 TABLET ORAL 2 TIMES DAILY
Qty: 20 TABLET | Refills: 0 | Status: SHIPPED | OUTPATIENT
Start: 2020-01-02 | End: 2020-01-16

## 2020-01-02 RX ORDER — OMEPRAZOLE 40 MG/1
40 CAPSULE, DELAYED RELEASE ORAL DAILY
Qty: 30 CAPSULE | Refills: 5 | Status: SHIPPED | OUTPATIENT
Start: 2020-01-02 | End: 2020-05-19 | Stop reason: SDUPTHER

## 2020-01-02 NOTE — ASSESSMENT & PLAN NOTE
Start on Bactrim.  Warm soaks.  Continue Bactroban ointment which she has at home to the site of boil.  Patient agrees to return in the next 1-2 weeks if symptoms worsening or not improving.  She declines surgical consult at this time.  Patient declines I&D in office today.  Infection control measures.

## 2020-01-02 NOTE — PROGRESS NOTES
Subjective   Donna Israel is a 59 y.o. female.     Chief Complaint   Patient presents with   • go over labs     Lab reviews  Got  A new boil    History of Present Illness:    Tobacco abuse-patient states she would like to quit smoking.  Her father  in his mid 50s from lung cancer.    Morbid obesity-patient states she does not exercise and she does not really watch her diet.    Type 2 diabetes with stage II chronic kidney disease without long-term insulin use.  She has stopped her metformin.  She states she is not going back on metformin because her neighbor told her that it might be bad for her.  She does report that her glucose readings are somewhat better.    Hypothyroidism-patient is taking Synthroid 200 mcg daily.  She reports feeling better and having more energy.    GERD- patient states her Zantac has been taken off the market.  She has been drinking Mylanta with a straw because she has been having heartburn.        The following portions of the patient's history, chief complaint and ROS were reviewed and updated as appropriate per provider:  Allergies, current medications, past family history, past medical history, past social history, past surgical history and problem list.    Review of Systems   Constitutional: Negative for activity change, appetite change, chills, fatigue, fever and unexpected weight change.   HENT: Negative for congestion, ear pain, nosebleeds, postnasal drip, rhinorrhea, sore throat, trouble swallowing and voice change.    Eyes: Negative for pain and visual disturbance.   Respiratory: Negative for cough, shortness of breath and wheezing.    Cardiovascular: Negative for chest pain, palpitations and leg swelling.   Gastrointestinal: Negative for abdominal pain, blood in stool, constipation and diarrhea.   Endocrine: Negative for cold intolerance, heat intolerance, polydipsia, polyphagia and polyuria.   Genitourinary: Negative for difficulty urinating, dysuria, flank pain, frequency,  "hematuria and urgency.   Musculoskeletal: Positive for arthralgias and back pain. Negative for gait problem, joint swelling, myalgias, neck pain and neck stiffness.   Skin: Positive for wound. Negative for color change, pallor and rash.   Allergic/Immunologic: Negative.  Negative for environmental allergies, food allergies and immunocompromised state.   Neurological: Negative for dizziness, seizures, syncope, facial asymmetry, numbness and headaches.   Hematological: Negative.    Psychiatric/Behavioral: Negative for confusion, dysphoric mood, self-injury, sleep disturbance and suicidal ideas. The patient is not nervous/anxious.    All other systems reviewed and are negative.      Objective     /80   Pulse 97   Temp 98.4 °F (36.9 °C) (Tympanic)   Ht 175.3 cm (69.02\")   Wt 112 kg (246 lb)   SpO2 96%   BMI 36.31 kg/m²   Appointment on 12/09/2019   Component Date Value Ref Range Status   • Glucose 12/09/2019 153* 65 - 99 mg/dL Final   • BUN 12/09/2019 8  6 - 20 mg/dL Final   • Creatinine 12/09/2019 0.69  0.57 - 1.00 mg/dL Final   • Sodium 12/09/2019 141  136 - 145 mmol/L Final   • Potassium 12/09/2019 3.8  3.5 - 5.2 mmol/L Final   • Chloride 12/09/2019 104  98 - 107 mmol/L Final   • CO2 12/09/2019 22.1  22.0 - 29.0 mmol/L Final   • Calcium 12/09/2019 8.9  8.6 - 10.5 mg/dL Final   • Total Protein 12/09/2019 7.3  6.0 - 8.5 g/dL Final   • Albumin 12/09/2019 3.80  3.50 - 5.20 g/dL Final   • ALT (SGPT) 12/09/2019 20  1 - 33 U/L Final   • AST (SGOT) 12/09/2019 25  1 - 32 U/L Final   • Alkaline Phosphatase 12/09/2019 65  39 - 117 U/L Final   • Total Bilirubin 12/09/2019 0.4  0.2 - 1.2 mg/dL Final   • eGFR Non African Amer 12/09/2019 87  >60 mL/min/1.73 Final   • Globulin 12/09/2019 3.5  gm/dL Final   • A/G Ratio 12/09/2019 1.1  g/dL Final   • BUN/Creatinine Ratio 12/09/2019 11.6  7.0 - 25.0 Final   • Anion Gap 12/09/2019 14.9  5.0 - 15.0 mmol/L Final   • Hemoglobin A1C 12/09/2019 7.61* 4.80 - 5.60 % Final   • Total " Cholesterol 12/09/2019 172  0 - 200 mg/dL Final   • Triglycerides 12/09/2019 380* 0 - 150 mg/dL Final   • HDL Cholesterol 12/09/2019 24* 40 - 60 mg/dL Final   • LDL Cholesterol  12/09/2019 72  0 - 100 mg/dL Final   • VLDL Cholesterol 12/09/2019 76* 5 - 40 mg/dL Final   • LDL/HDL Ratio 12/09/2019 3.00   Final   • TSH 12/09/2019 1.100  0.270 - 4.200 uIU/mL Final   • Vitamin B-12 12/09/2019 385  211 - 946 pg/mL Final   • 25 Hydroxy, Vitamin D 12/09/2019 20.6* 30.0 - 100.0 ng/ml Final   • WBC 12/09/2019 6.77  3.40 - 10.80 10*3/mm3 Final   • RBC 12/09/2019 4.37  3.77 - 5.28 10*6/mm3 Final   • Hemoglobin 12/09/2019 13.6  12.0 - 15.9 g/dL Final   • Hematocrit 12/09/2019 39.8  34.0 - 46.6 % Final   • MCV 12/09/2019 91.1  79.0 - 97.0 fL Final   • MCH 12/09/2019 31.1  26.6 - 33.0 pg Final   • MCHC 12/09/2019 34.2  31.5 - 35.7 g/dL Final   • RDW 12/09/2019 12.8  12.3 - 15.4 % Final   • RDW-SD 12/09/2019 42.7  37.0 - 54.0 fl Final   • MPV 12/09/2019 10.0  6.0 - 12.0 fL Final   • Platelets 12/09/2019 210  140 - 450 10*3/mm3 Final   • Neutrophil % 12/09/2019 66.3  42.7 - 76.0 % Final   • Lymphocyte % 12/09/2019 24.4  19.6 - 45.3 % Final   • Monocyte % 12/09/2019 5.5  5.0 - 12.0 % Final   • Eosinophil % 12/09/2019 2.7  0.3 - 6.2 % Final   • Basophil % 12/09/2019 0.7  0.0 - 1.5 % Final   • Immature Grans % 12/09/2019 0.4  0.0 - 0.5 % Final   • Neutrophils, Absolute 12/09/2019 4.49  1.70 - 7.00 10*3/mm3 Final   • Lymphocytes, Absolute 12/09/2019 1.65  0.70 - 3.10 10*3/mm3 Final   • Monocytes, Absolute 12/09/2019 0.37  0.10 - 0.90 10*3/mm3 Final   • Eosinophils, Absolute 12/09/2019 0.18  0.00 - 0.40 10*3/mm3 Final   • Basophils, Absolute 12/09/2019 0.05  0.00 - 0.20 10*3/mm3 Final   • Immature Grans, Absolute 12/09/2019 0.03  0.00 - 0.05 10*3/mm3 Final   • nRBC 12/09/2019 0.0  0.0 - 0.2 /100 WBC Final       Physical Exam   Constitutional: She is oriented to person, place, and time. Vital signs are normal. She appears well-developed  and well-nourished. No distress.   HENT:   Head: Normocephalic.   Right Ear: Tympanic membrane, external ear and ear canal normal.   Left Ear: Tympanic membrane, external ear and ear canal normal.   Nose: Nose normal. Right sinus exhibits no maxillary sinus tenderness and no frontal sinus tenderness. Left sinus exhibits no maxillary sinus tenderness and no frontal sinus tenderness.   Mouth/Throat: Uvula is midline, oropharynx is clear and moist and mucous membranes are normal. No oropharyngeal exudate or posterior oropharyngeal erythema.   Eyes: Pupils are equal, round, and reactive to light. Conjunctivae, EOM and lids are normal. Right eye exhibits no discharge. Left eye exhibits no discharge.   Neck: Normal range of motion. Neck supple. No tracheal deviation present. No thyromegaly present.   Cardiovascular: Normal rate, regular rhythm and normal heart sounds. Exam reveals no gallop and no friction rub.   No murmur heard.  Pulmonary/Chest: Effort normal and breath sounds normal. No respiratory distress. She has no wheezes. She has no rales. She exhibits no tenderness.   Abdominal: Soft. Normal appearance and bowel sounds are normal. She exhibits no distension and no mass. There is no tenderness. There is no rebound and no guarding.   Musculoskeletal: Normal range of motion.   Lymphadenopathy:     She has no cervical adenopathy.   Neurological: She is alert and oriented to person, place, and time. She has normal reflexes.   CN 2-12 grossly intact    Skin: Skin is warm and dry. Capillary refill takes less than 2 seconds. Lesion noted. No rash noted. She is not diaphoretic. No erythema.        Psychiatric: She has a normal mood and affect. Her speech is normal and behavior is normal. Judgment and thought content normal. Cognition and memory are normal.   Vitals reviewed.      Assessment/Plan     Problem List Items Addressed This Visit        Cardiovascular and Mediastinum    Hyperlipidemia    Current Assessment &  Plan     Lipid abnormalities are improving with lifestyle modifications and treatment.  Nutritional counseling was provided. and Pharmacotherapy as ordered.  Discussed compliance with current medication and diet regimen.  Continue Zocor.  Lipids will be reassessed in 3 months.    Lab Results   Component Value Date    CHOL 172 12/09/2019    CHLPL 196 03/18/2016    TRIG 380 (H) 12/09/2019    HDL 24 (L) 12/09/2019    LDL 72 12/09/2019              Relevant Orders    CBC & Differential    Comprehensive Metabolic Panel    TSH    Hemoglobin A1c    Vitamin B12    Lipid Panel    Essential hypertension    Current Assessment & Plan     Hypertension is improving with lifestyle modifications.  Continue current treatment regimen.  Dietary sodium restriction.  Weight loss.  Stop smoking.  Blood pressure will be reassessed in 3 months.         Relevant Orders    CBC & Differential    Comprehensive Metabolic Panel    TSH    Hemoglobin A1c    Vitamin B12    Lipid Panel       Digestive    Gastroesophageal reflux disease without esophagitis    Current Assessment & Plan     Stop Zantac due to recall.  Start Prilosec.  GERD diet and weight loss recommended.         Relevant Medications    omeprazole (priLOSEC) 40 MG capsule    Morbid obesity (CMS/HCC)    Current Assessment & Plan     Obesity is improving with lifestyle modifications.  Discussed the patient's BMI.  The BMI is above average; BMI management plan is completed.  General weight loss/lifestyle modification strategies discussed (elicit support from others; identify saboteurs; non-food rewards, etc).         Vitamin D deficiency       Endocrine    Acquired hypothyroidism    Relevant Orders    CBC & Differential    Comprehensive Metabolic Panel    TSH    Hemoglobin A1c    Vitamin B12    Lipid Panel    Type 2 diabetes mellitus with stage 2 chronic kidney disease, without long-term current use of insulin (CMS/HCC)    Current Assessment & Plan     Renal condition is improving with  lifestyle modifications and treatment.  Continue current treatment regimen.  Weight loss.  Monitor daily weight.  Stop smoking.  Renal condition will be reassessed in 3 months.         Relevant Orders    CBC & Differential    Comprehensive Metabolic Panel    TSH    Hemoglobin A1c    Vitamin B12    Lipid Panel       Nervous and Auditory    Low back pain    Current Assessment & Plan     weight loss and reviewed body mechanics            Musculoskeletal and Integument    Boil    Current Assessment & Plan     Start on Bactrim.  Warm soaks.  Continue Bactroban ointment which she has at home to the site of boil.  Patient agrees to return in the next 1-2 weeks if symptoms worsening or not improving.  She declines surgical consult at this time.  Patient declines I&D in office today.  Infection control measures.         Relevant Medications    sulfamethoxazole-trimethoprim (BACTRIM DS,SEPTRA DS) 800-160 MG per tablet       Other    Tobacco abuse - Primary    Current Assessment & Plan     Smoke cessation education provided.  We have established a quit date. Reviewed the adverse effects of smoking. Discussed community programs as well as medical options to assist with cessation. Total time today spent on smoke cessation education 11 minutes. We have discussed the risk versus benefits of nicotine patches, Wellbutrin and Chantix.  We have discussed methods to distract from the habit of having a cigarette in hand such as carrying a strong chewing gum.  Individualized plan of care has been developed.                  Labs reviewed.  Discussed diabetic compliance.  Start bactrim and warm soaks for her recurrent boils, declines referral for lancing / surgical consult at this time. States she will come back in a week if not resolved.  Stop zantac and start Prilosec.   Continue synthroid.         Patient's Body mass index is 36.31 kg/m². BMI is above normal parameters. Recommendations include: exercise counseling and nutrition  counseling.    Donna Israel  reports that she has been smoking cigarettes. She has been smoking about 0.50 packs per day. She has never used smokeless tobacco.. I have educated her on the risk of diseases from using tobacco products such as cancer, COPD and heart diease. Father  of lung cancer in his mid 50's.     I advised her to quit and she is willing to quit. We have discussed the following method/s for tobacco cessation:  Counseling [place].  Together we have set a quit date for 1 month from today.  She will follow up with me in a few weeks or sooner to check on her progress.    I spent >10 minutes counseling the patient.       I have discussed diagnosis in detail today allowing time for questions and answers. Patient is aware of reasons to seek urgent or emergent medical care as well as reasons to return to the clinic for evaluation. Possible side effects, interactions and progression of symptoms discussed as well. Patient / family states understanding.   Emotional support and active listening provided.     See me in 1-2 weeks for further evaluation.     Follow up in 3 months for routine care. Routine labs fasting one week prior to next office visit. Return sooner if needed.         This document has been electronically signed by:  BRIAN Arrieta, NP-C

## 2020-01-02 NOTE — ASSESSMENT & PLAN NOTE
Lipid abnormalities are improving with lifestyle modifications and treatment.  Nutritional counseling was provided. and Pharmacotherapy as ordered.  Discussed compliance with current medication and diet regimen.  Continue Zocor.  Lipids will be reassessed in 3 months.    Lab Results   Component Value Date    CHOL 172 12/09/2019    CHLPL 196 03/18/2016    TRIG 380 (H) 12/09/2019    HDL 24 (L) 12/09/2019    LDL 72 12/09/2019

## 2020-01-02 NOTE — ASSESSMENT & PLAN NOTE
Renal condition is improving with lifestyle modifications and treatment.  Continue current treatment regimen.  Weight loss.  Monitor daily weight.  Stop smoking.  Renal condition will be reassessed in 3 months.

## 2020-01-16 ENCOUNTER — OFFICE VISIT (OUTPATIENT)
Dept: FAMILY MEDICINE CLINIC | Facility: CLINIC | Age: 60
End: 2020-01-16

## 2020-01-16 VITALS
SYSTOLIC BLOOD PRESSURE: 120 MMHG | DIASTOLIC BLOOD PRESSURE: 80 MMHG | TEMPERATURE: 98.6 F | HEART RATE: 89 BPM | OXYGEN SATURATION: 96 % | BODY MASS INDEX: 36.43 KG/M2 | WEIGHT: 246 LBS | HEIGHT: 69 IN

## 2020-01-16 DIAGNOSIS — E66.01 MORBID OBESITY (HCC): ICD-10-CM

## 2020-01-16 DIAGNOSIS — N18.2 TYPE 2 DIABETES MELLITUS WITH STAGE 2 CHRONIC KIDNEY DISEASE, WITHOUT LONG-TERM CURRENT USE OF INSULIN (HCC): ICD-10-CM

## 2020-01-16 DIAGNOSIS — L02.92 BOIL: Primary | ICD-10-CM

## 2020-01-16 DIAGNOSIS — Z72.0 TOBACCO ABUSE: ICD-10-CM

## 2020-01-16 DIAGNOSIS — E11.22 TYPE 2 DIABETES MELLITUS WITH STAGE 2 CHRONIC KIDNEY DISEASE, WITHOUT LONG-TERM CURRENT USE OF INSULIN (HCC): ICD-10-CM

## 2020-01-16 PROCEDURE — 99214 OFFICE O/P EST MOD 30 MIN: CPT | Performed by: NURSE PRACTITIONER

## 2020-01-16 NOTE — ASSESSMENT & PLAN NOTE
Improving.  Bactrim DS has been completed.  No longer draining.  Size greatly decreased.  Have encouraged patient to keep area open to air when at home.  Avoid sitting on the area for prolonged periods.  Recommend she rest on her stomach while watching television or sleeping.  New order today for Bactroban ointment which I would like her to apply 3 times daily.  Continue to wash area with antibacterial soap and water, rinse well and pat dry daily.

## 2020-01-16 NOTE — PROGRESS NOTES
Subjective   Donna Israel is a 59 y.o. female.     Chief Complaint   Patient presents with   • Skin Lesion     Boils on my butt    History of Present Illness:    Recurrent boils on  Buttock - has been taking Bactrim DS.  Area is getting better.  No drainage.  Remains tender.  She does feel as if it is getting better.  She is cleaning it daily and try to keep it open to air as possible.    Type 2 diabetes with stage II chronic kidney disease-patient is working on diet and try to get her blood sugar under better control.  She reports compliance with her medications and better compliance with her diet.    Obesity-patient reports she is working on a better diet and lifestyle.    Tobacco abuse- patient states she does continue to smoke but she wishes she could quit.      The following portions of the patient's history, chief complaint and ROS were reviewed and updated as appropriate per provider:  Allergies, current medications, past family history, past medical history, past social history, past surgical history and problem list.    Review of Systems   Constitutional: Negative for activity change, appetite change, chills, diaphoresis, fatigue and fever.   HENT: Negative.    Eyes: Negative.    Respiratory: Negative.  Negative for cough, chest tightness, shortness of breath and wheezing.    Cardiovascular: Negative.  Negative for chest pain, palpitations and leg swelling.   Gastrointestinal: Negative.  Negative for abdominal pain, diarrhea, nausea and vomiting.   Endocrine: Negative.    Genitourinary: Negative.  Negative for dysuria, frequency and urgency.   Musculoskeletal: Positive for back pain and joint swelling.   Skin: Positive for wound. Negative for color change, pallor and rash.   Allergic/Immunologic: Negative.    Neurological: Negative.  Negative for syncope, facial asymmetry, speech difficulty and numbness.   Hematological: Negative.    Psychiatric/Behavioral: Negative for behavioral problems, dysphoric  "mood, self-injury and suicidal ideas. The patient is not nervous/anxious.        Objective     /80   Pulse 89   Temp 98.6 °F (37 °C) (Oral)   Ht 175.3 cm (69.02\")   Wt 112 kg (246 lb)   SpO2 96%   BMI 36.31 kg/m²   Appointment on 12/09/2019   Component Date Value Ref Range Status   • Glucose 12/09/2019 153* 65 - 99 mg/dL Final   • BUN 12/09/2019 8  6 - 20 mg/dL Final   • Creatinine 12/09/2019 0.69  0.57 - 1.00 mg/dL Final   • Sodium 12/09/2019 141  136 - 145 mmol/L Final   • Potassium 12/09/2019 3.8  3.5 - 5.2 mmol/L Final   • Chloride 12/09/2019 104  98 - 107 mmol/L Final   • CO2 12/09/2019 22.1  22.0 - 29.0 mmol/L Final   • Calcium 12/09/2019 8.9  8.6 - 10.5 mg/dL Final   • Total Protein 12/09/2019 7.3  6.0 - 8.5 g/dL Final   • Albumin 12/09/2019 3.80  3.50 - 5.20 g/dL Final   • ALT (SGPT) 12/09/2019 20  1 - 33 U/L Final   • AST (SGOT) 12/09/2019 25  1 - 32 U/L Final   • Alkaline Phosphatase 12/09/2019 65  39 - 117 U/L Final   • Total Bilirubin 12/09/2019 0.4  0.2 - 1.2 mg/dL Final   • eGFR Non African Amer 12/09/2019 87  >60 mL/min/1.73 Final   • Globulin 12/09/2019 3.5  gm/dL Final   • A/G Ratio 12/09/2019 1.1  g/dL Final   • BUN/Creatinine Ratio 12/09/2019 11.6  7.0 - 25.0 Final   • Anion Gap 12/09/2019 14.9  5.0 - 15.0 mmol/L Final   • Hemoglobin A1C 12/09/2019 7.61* 4.80 - 5.60 % Final   • Total Cholesterol 12/09/2019 172  0 - 200 mg/dL Final   • Triglycerides 12/09/2019 380* 0 - 150 mg/dL Final   • HDL Cholesterol 12/09/2019 24* 40 - 60 mg/dL Final   • LDL Cholesterol  12/09/2019 72  0 - 100 mg/dL Final   • VLDL Cholesterol 12/09/2019 76* 5 - 40 mg/dL Final   • LDL/HDL Ratio 12/09/2019 3.00   Final   • TSH 12/09/2019 1.100  0.270 - 4.200 uIU/mL Final   • Vitamin B-12 12/09/2019 385  211 - 946 pg/mL Final   • 25 Hydroxy, Vitamin D 12/09/2019 20.6* 30.0 - 100.0 ng/ml Final   • WBC 12/09/2019 6.77  3.40 - 10.80 10*3/mm3 Final   • RBC 12/09/2019 4.37  3.77 - 5.28 10*6/mm3 Final   • Hemoglobin " 12/09/2019 13.6  12.0 - 15.9 g/dL Final   • Hematocrit 12/09/2019 39.8  34.0 - 46.6 % Final   • MCV 12/09/2019 91.1  79.0 - 97.0 fL Final   • MCH 12/09/2019 31.1  26.6 - 33.0 pg Final   • MCHC 12/09/2019 34.2  31.5 - 35.7 g/dL Final   • RDW 12/09/2019 12.8  12.3 - 15.4 % Final   • RDW-SD 12/09/2019 42.7  37.0 - 54.0 fl Final   • MPV 12/09/2019 10.0  6.0 - 12.0 fL Final   • Platelets 12/09/2019 210  140 - 450 10*3/mm3 Final   • Neutrophil % 12/09/2019 66.3  42.7 - 76.0 % Final   • Lymphocyte % 12/09/2019 24.4  19.6 - 45.3 % Final   • Monocyte % 12/09/2019 5.5  5.0 - 12.0 % Final   • Eosinophil % 12/09/2019 2.7  0.3 - 6.2 % Final   • Basophil % 12/09/2019 0.7  0.0 - 1.5 % Final   • Immature Grans % 12/09/2019 0.4  0.0 - 0.5 % Final   • Neutrophils, Absolute 12/09/2019 4.49  1.70 - 7.00 10*3/mm3 Final   • Lymphocytes, Absolute 12/09/2019 1.65  0.70 - 3.10 10*3/mm3 Final   • Monocytes, Absolute 12/09/2019 0.37  0.10 - 0.90 10*3/mm3 Final   • Eosinophils, Absolute 12/09/2019 0.18  0.00 - 0.40 10*3/mm3 Final   • Basophils, Absolute 12/09/2019 0.05  0.00 - 0.20 10*3/mm3 Final   • Immature Grans, Absolute 12/09/2019 0.03  0.00 - 0.05 10*3/mm3 Final   • nRBC 12/09/2019 0.0  0.0 - 0.2 /100 WBC Final       Physical Exam   Constitutional: She is oriented to person, place, and time. She appears well-developed and well-nourished. No distress.   HENT:   Head: Atraumatic.   Left Ear: External ear normal.   Nose: Nose normal.   Mouth/Throat: Oropharynx is clear and moist. No oropharyngeal exudate.   Eyes: Pupils are equal, round, and reactive to light. Right eye exhibits no discharge. No scleral icterus.   Neck: Neck supple. No thyromegaly present.   Cardiovascular: Normal rate, regular rhythm, normal heart sounds and intact distal pulses.   Pulmonary/Chest: Effort normal and breath sounds normal. No respiratory distress. She has no wheezes. She exhibits no tenderness.   Abdominal: Soft. Bowel sounds are normal. She exhibits no  distension. There is no tenderness. There is no guarding.   Musculoskeletal: Normal range of motion. She exhibits no edema, tenderness or deformity.   Lymphadenopathy:     She has no cervical adenopathy.     She has no axillary adenopathy.   Neurological: She is alert and oriented to person, place, and time. No cranial nerve deficit. Coordination normal.   Skin: Skin is warm. Capillary refill takes less than 2 seconds. No rash noted. She is not diaphoretic. No pallor.        Psychiatric: She has a normal mood and affect. Her behavior is normal. Judgment and thought content normal.   Vitals reviewed.      Assessment/Plan     Problem List Items Addressed This Visit        Digestive    Morbid obesity (CMS/[facility])    Current Assessment & Plan     Obesity is unchanged.  Discussed the patient's BMI.  The BMI is above average; BMI management plan is completed.  General weight loss/lifestyle modification strategies discussed (elicit support from others; identify saboteurs; non-food rewards, etc).            Endocrine    Type 2 diabetes mellitus with stage 2 chronic kidney disease, without long-term current use of insulin (CMS/[facility])    Current Assessment & Plan     Renal condition is improving with lifestyle modifications.  Continue current treatment regimen.  Renal condition will be reassessed next Scheduled visit.            Musculoskeletal and Integument    Boil - Primary    Current Assessment & Plan     Improving.  Bactrim DS has been completed.  No longer draining.  Size greatly decreased.  Have encouraged patient to keep area open to air when at home.  Avoid sitting on the area for prolonged periods.  Recommend she rest on her stomach while watching television or sleeping.  New order today for Bactroban ointment which I would like her to apply 3 times daily.  Continue to wash area with antibacterial soap and water, rinse well and pat dry daily.         Relevant Medications    mupirocin (BACTROBAN) 2 % ointment       Other      Tobacco abuse    Current Assessment & Plan     Strongly recommended patient stop smoking                        Patient's Body mass index is 36.31 kg/m². BMI is above normal parameters. Recommendations include: exercise counseling and nutrition counseling.    Donna Israel  reports that she has been smoking cigarettes. She has been smoking about 0.50 packs per day. She has never used smokeless tobacco.. I have educated her on the risk of diseases from using tobacco products such as cancer, COPD and heart diease.     I advised her to quit and she is willing to quit. We have discussed the following method/s for tobacco cessation:  Education Material.  Together we have set a quit date for /not ready to set a quit date/wants to gradually quit .  She will follow up with me in a few weeks or sooner to check on her progress.    I spent 3  minutes counseling the patient.       I have discussed diagnosis in detail today allowing time for questions and answers. Patient is aware of reasons to seek urgent or emergent medical care as well as reasons to return to the clinic for evaluation. Possible side effects, interactions and progression of symptoms discussed as well. Patient / family states understanding.   Emotional support and active listening provided.       Diabetes education discussed and reviewed with patient.  Sick day rules reviewed.  Discussed slow skin healing due to diabetes.  Strongly encouraged to quit smoking.  Have discussed a heart healthy diet and encouraged weight loss.  Patient will return in about 2 weeks for further evaluation of her recurrent boils.  She will follow-up sooner if needed.          This document has been electronically signed by:  BRIAN Arrieta, NP-C

## 2020-04-11 ENCOUNTER — APPOINTMENT (OUTPATIENT)
Dept: GENERAL RADIOLOGY | Facility: HOSPITAL | Age: 60
End: 2020-04-11

## 2020-04-11 ENCOUNTER — HOSPITAL ENCOUNTER (EMERGENCY)
Facility: HOSPITAL | Age: 60
Discharge: HOME | End: 2020-04-11
Attending: EMERGENCY MEDICINE | Admitting: FAMILY MEDICINE

## 2020-04-11 VITALS
RESPIRATION RATE: 18 BRPM | DIASTOLIC BLOOD PRESSURE: 75 MMHG | WEIGHT: 270 LBS | TEMPERATURE: 98 F | OXYGEN SATURATION: 98 % | SYSTOLIC BLOOD PRESSURE: 142 MMHG | HEART RATE: 75 BPM | BODY MASS INDEX: 39.99 KG/M2 | HEIGHT: 69 IN

## 2020-04-11 DIAGNOSIS — S62.101A CLOSED FRACTURE OF RIGHT WRIST, INITIAL ENCOUNTER: Primary | ICD-10-CM

## 2020-04-11 PROCEDURE — 73110 X-RAY EXAM OF WRIST: CPT | Performed by: RADIOLOGY

## 2020-04-11 PROCEDURE — 73110 X-RAY EXAM OF WRIST: CPT

## 2020-04-11 PROCEDURE — 99282 EMERGENCY DEPT VISIT SF MDM: CPT

## 2020-04-11 RX ORDER — HYDROCODONE BITARTRATE AND ACETAMINOPHEN 7.5; 325 MG/1; MG/1
1 TABLET ORAL EVERY 6 HOURS PRN
Qty: 10 TABLET | Refills: 0 | Status: SHIPPED | OUTPATIENT
Start: 2020-04-11 | End: 2020-05-01

## 2020-04-11 NOTE — ED NOTES
Pt reports that she was walking from the [place] and stepped down and fell landed on her knees and on her right wrist and is having increased right wrist/hand pain and swelling.       Lali Landrum RN  04/11/20 [id number]

## 2020-04-11 NOTE — ED NOTES
Pt requested sling, AVINASH Caldwell, informed to give one, sling applied at this time.     Lali Landrum, RN  04/11/20 [id number]

## 2020-04-11 NOTE — ED PROVIDER NOTES
Subjective     History provided by:  Patient   used: No    Fall   Mechanism of injury: fall    Injury location:  Hand  Hand injury location:  R wrist  Incident location:  Home  Time since incident:  1 day  Fall:     Fall occurred:  Tripped    Point of impact:  Outstretched arms    Entrapped after fall: no    Protective equipment: none    Associated symptoms: no abdominal pain, no difficulty breathing, no headaches, no loss of consciousness, no nausea, no seizures and no vomiting    Risk factors: no AICD, no anticoagulation therapy, no asthma, no CABG, no CHF, no COPD, no dialysis, no pacemaker, no past MI and no steroid use        Review of Systems   Constitutional: Negative.    HENT: Negative.    Eyes: Negative.    Respiratory: Negative.    Cardiovascular: Negative.    Gastrointestinal: Negative.  Negative for abdominal pain, nausea and vomiting.   Endocrine: Negative.    Genitourinary: Negative.    Musculoskeletal: Negative.    Skin: Negative.    Allergic/Immunologic: Negative.    Neurological: Negative.  Negative for seizures, loss of consciousness and headaches.   Hematological: Negative.    Psychiatric/Behavioral: Negative.        Past Medical History:   Diagnosis Date   • Arthritis     osteo   • Edema    • GERD (gastroesophageal reflux disease)    • Hyperlipidemia    • Hypertension    • Hypothyroidism    • Inj musc/tend the rotator cuff of right shoulder, subs    • Lumbago    • Memory loss    • Obesity    • Osteopenia    • Pain in right shoulder    • Stiffness of right shoulder joint    • Tobacco use disorder    • Type 2 diabetes mellitus (CMS/HCC)    • Vitamin D deficiency        Allergies   Allergen Reactions   • Ibuprofen Urinary Retention       Past Surgical History:   Procedure Laterality Date   • ROTATOR CUFF REPAIR Right    • TUBAL ABDOMINAL LIGATION         Family History   Problem Relation Age of Onset   • Arthritis Mother    • COPD Mother    • Hyperlipidemia Mother    •  Hypertension Mother    • Thyroid disease Mother    • Alcohol abuse Father    • Arthritis Father    • Cancer Father    • Diabetes Father    • Hyperlipidemia Father    • Arthritis Sister    • COPD Sister    • Diabetes Sister    • Heart disease Sister    • Hyperlipidemia Sister    • Hypertension Sister    • Thyroid disease Sister    • Diabetes Brother    • Hyperlipidemia Brother    • Hypertension Brother    • Heart disease Maternal Grandmother    • Arthritis Sister    • Diabetes Sister    • Hyperlipidemia Sister    • Thyroid disease Sister    • Thyroid disease Sister        Social History     Socioeconomic History   • Marital status:      Spouse name: Not on file   • Number of children: 3   • Years of education: Not on file   • Highest education level: GED or equivalent   Social Needs   • Financial resource strain: Somewhat hard   • Food insecurity:     Worry: Never true     Inability: Never true   • Transportation needs:     Medical: No     Non-medical: No   Tobacco Use   • Smoking status: Current Every Day Smoker     Packs/day: 0.50     Types: Cigarettes   • Smokeless tobacco: Never Used   Substance and Sexual Activity   • Alcohol use: No   • Drug use: No   • Sexual activity: Not Currently     Partners: Male     Birth control/protection: None   Lifestyle   • Physical activity:     Days per week: 0 days     Minutes per session: 0 min   • Stress: To some extent   Relationships   • Social connections:     Talks on phone: Once a week     Gets together: Once a week     Attends Oriental orthodox service: Never     Active member of club or organization: No     Attends meetings of clubs or organizations: Never     Relationship status:            Objective   Physical Exam   Constitutional: She is oriented to person, place, and time. She appears well-developed and well-nourished.   HENT:   Head: Normocephalic.   Right Ear: External ear normal.   Left Ear: External ear normal.   Mouth/Throat: Oropharynx is clear and  moist.   Eyes: Pupils are equal, round, and reactive to light. Conjunctivae and EOM are normal.   Neck: Normal range of motion. Neck supple.   Cardiovascular: Normal rate, regular rhythm, normal heart sounds and intact distal pulses.   Pulmonary/Chest: Effort normal and breath sounds normal.   Abdominal: Soft. Bowel sounds are normal.   Musculoskeletal:        Right wrist: She exhibits decreased range of motion, tenderness and deformity.   Neurological: She is alert and oriented to person, place, and time.   Skin: Skin is warm and dry. Capillary refill takes less than 2 seconds.   Psychiatric: She has a normal mood and affect. Her behavior is normal. Thought content normal.   Nursing note and vitals reviewed.      Splint - Cast - Strapping  Date/Time: 4/11/2020 3:49 PM  Performed by: Jose Ortega APRN  Authorized by: Fanta Grant DO     Consent:     Consent obtained:  Verbal    Consent given by:  Patient    Risks discussed:  Discoloration    Alternatives discussed:  Alternative treatment  Pre-procedure details:     Sensation:  Normal  Procedure details:     Laterality:  Right    Location:  Wrist    Wrist:  R wrist    Splint type:  Volar short arm    Supplies:  Ortho-Glass  Post-procedure details:     Pain:  Improved    Sensation:  Normal    Patient tolerance of procedure:  Tolerated well, no immediate complications               ED Course  ED Course as of Apr 11 2009   Sat Apr 11, 2020   1543 Imaging reviewed per DR Vaughn. Advises to splint and have patient come to office Tuesday AM.    [EMERITA]      ED Course User Index  [EMERITA] Jose Ortega APRN                                           [facility]    Final diagnoses:   Closed fracture of right wrist, initial encounter            Jose Ortega APRN  04/11/20 2009

## 2020-04-11 NOTE — ED NOTES
Pt resting quietly on stretcher with continuing complaint of left wrist pain.  Pt AAOx4 with no resp distress noted, respirations even and unlabored.  Pt denies any needs at this time.  Skin PWD.  Will continue to monitor and follow plan of care.  Bed rails up x2, bed in lowest position, call light in reach.       Lali Landrum, RN  04/11/20 7566

## 2020-04-11 NOTE — ED NOTES
Pt resting quietly on stretcher with no new complaints.  Pt AAOx4 with no resp distress noted, respirations even and unlabored.  Pt denies any needs at this time.  Skin PWD.  Will continue to monitor and follow plan of care.  Bed rails up x2, bed in lowest position, call light in reach.       Lali Landrum, RN  04/11/20 0359

## 2020-04-13 ENCOUNTER — EPISODE CHANGES (OUTPATIENT)
Dept: CASE MANAGEMENT | Facility: OTHER | Age: 60
End: 2020-04-13

## 2020-04-16 ENCOUNTER — PATIENT OUTREACH (OUTPATIENT)
Dept: CASE MANAGEMENT | Facility: OTHER | Age: 60
End: 2020-04-16

## 2020-04-16 NOTE — OUTREACH NOTE
Care Coordination Assessment    Documented/Reviewed By:  Maryjaen Busby RN Date/time:  4/16/2020 12:06 PM   Assessment completed with:  patient  Enrolled in care management program:  No  Support system:  children, family  Type of residence:  private residence  Home care services:  No  Medication adherence problem:  No  History of fall(s) in last 6 months:  Yes

## 2020-04-16 NOTE — OUTREACH NOTE
Care Plan Note      Responses   Lifestyle Goals  Decrease falls risk, Eat a healthy diet, Self monitor blood sugar, Routine foot care, Routine follow-up with doctor(s), Medication management   Self Management  Get flu/pneumonia shot, Medication Adherence, Home Glucose Monitoring   Suggested Appointments  Other (See Comment) [Follow with ortho as scheduled.]   Annual Wellness Visit:   Patient Has Completed   Care Gaps Addressed  Mammogram, Diabetic A1C, Flu Shot   HbA1c Status  Up to Date-within defined limits   HbA1c Completion at [facility] or Other  [facility]   Flu Shot Status  Refused   Mammogram Status  Up to Date   Mammogram Completion at [facility] or Other  [facility]   Advanced Directives:  Not Interested At This Time        The main concerns and/or symptoms the patient would like to address are: RN-ACM outreach to patient following ED visit at [facility] 04/11/20. Patient presented with c/o wrist pain after a trip/n/fall. Patient was treated and discharged to home to follow with Ortho/Dr. Lo as outpatient.      Education/instruction provided by Care Coordinator:  AVS reviewed, assessment & care plan initiated.  Patient feels well managed and not interested in additional HRCM outreach.  Patient stated compliance with medications and having f/u appointment scheduled with Dr. Lo 04/30/2020.  MyChart active, AWV current, and patient not interested in ACP. Healthy at Home precautions reviewed.  Patient stated compliance.  Patient denied needs related to transportation, food integrity, and medications.     Follow Up Outreach Due: Contact information provided, patient encouraged to call as needed.    Maryjane Busby RN  Ambulatory     4/16/2020, 12:10

## 2020-05-01 ENCOUNTER — OFFICE VISIT (OUTPATIENT)
Dept: FAMILY MEDICINE CLINIC | Facility: CLINIC | Age: 60
End: 2020-05-01

## 2020-05-01 DIAGNOSIS — S62.101A CLOSED FRACTURE OF RIGHT WRIST, INITIAL ENCOUNTER: Primary | ICD-10-CM

## 2020-05-01 DIAGNOSIS — S52.501G CLOSED FRACTURE OF DISTAL END OF RIGHT RADIUS WITH DELAYED HEALING, UNSPECIFIED FRACTURE MORPHOLOGY, SUBSEQUENT ENCOUNTER: Primary | ICD-10-CM

## 2020-05-01 PROCEDURE — G2025 DIS SITE TELE SVCS RHC/FQHC: HCPCS | Performed by: PHYSICIAN ASSISTANT

## 2020-05-01 RX ORDER — ACETAMINOPHEN AND CODEINE PHOSPHATE 60; 300 MG/1; MG/1
1 TABLET ORAL EVERY 6 HOURS PRN
Qty: 20 TABLET | Refills: 0 | Status: SHIPPED | OUTPATIENT
Start: 2020-05-01 | End: 2020-05-19 | Stop reason: SDUPTHER

## 2020-05-05 DIAGNOSIS — E03.9 ACQUIRED HYPOTHYROIDISM: ICD-10-CM

## 2020-05-05 RX ORDER — LEVOTHYROXINE SODIUM 0.2 MG/1
TABLET ORAL
Qty: 90 TABLET | Refills: 0 | Status: SHIPPED | OUTPATIENT
Start: 2020-05-05 | End: 2020-08-12 | Stop reason: SDUPTHER

## 2020-05-06 DIAGNOSIS — Z79.4 TYPE 2 DIABETES MELLITUS WITH DIABETIC PERIPHERAL ANGIOPATHY WITHOUT GANGRENE, WITH LONG-TERM CURRENT USE OF INSULIN (HCC): ICD-10-CM

## 2020-05-06 DIAGNOSIS — E11.51 TYPE 2 DIABETES MELLITUS WITH DIABETIC PERIPHERAL ANGIOPATHY WITHOUT GANGRENE, WITH LONG-TERM CURRENT USE OF INSULIN (HCC): ICD-10-CM

## 2020-05-06 PROBLEM — S52.501G: Status: ACTIVE | Noted: 2020-05-06

## 2020-05-06 RX ORDER — PEN NEEDLE, DIABETIC 31 GX5/16"
NEEDLE, DISPOSABLE MISCELLANEOUS
Qty: 100 EACH | Refills: 0 | Status: SHIPPED | OUTPATIENT
Start: 2020-05-06 | End: 2020-10-19

## 2020-05-06 NOTE — PROGRESS NOTES
Subjective   Donna Israel is a 60 y.o. female.     Telephone Visit    You have chosen to receive care through a telephone visit. Do you consent to use a telephone visit for your medical care today? Yes    This visit has been rescheduled as a phone visit to comply with patient safety concerns in accordance with CDC recommendations. Total time of discussion was 21 minutes.      Chief Complaint -wrist pain    History of Present Illness -      Wrist pain-  Patient had a traumatic injury when she fell on 4/11/2020 fracturing her wrist and receiving severe contusion to her right rib.  She underwent surgical correction of the right wrist on 4/15/2020.  After surgery she states that a splint was placed and left on for 2 weeks.  Patient states that when the splint was taken off yesterday that she had some tingling in her fingers and was told that it may have been placed too tightly.  She complains of localized swelling in her fingers of the right hand.  The right wrist area was placed in a cast yesterday.  Patient states that she has moderate to severe achy pain in the right wrist that is preventing her from sleeping at night.  She reports that Dr. Paget had written her a prescription for Tylenol 3 with codeine but this is not available at her pharmacy or local pharmacy.  When she contacted the orthopedic office back her orthopedist was not available and was informed to contact her primary care provider.    4/11/2020 x-ray of the right wrist revealed angulated but not displaced multipart fracture of the distal radius.  Extension to articular surface is noted.  Joint effusion is present.  Dorsal regulation of distal fracture fragment.    The following portions of the patient's history were reviewed and updated as appropriate: allergies, current medications, past family history, past medical history, past social history, past surgical history and problem list.    Review of Systems   Constitutional: Negative for activity  change, appetite change, fatigue and fever.   HENT: Negative for ear pain, sinus pressure and sore throat.    Eyes: Negative for pain and visual disturbance.   Respiratory: Negative for cough and chest tightness.    Cardiovascular: Negative for chest pain and palpitations.   Gastrointestinal: Negative for abdominal pain, constipation, diarrhea, nausea and vomiting.   Endocrine: Negative for polydipsia and polyuria.   Genitourinary: Negative for dysuria and frequency.   Musculoskeletal: Positive for arthralgias, joint swelling and myalgias. Negative for back pain.        Right hand/wrist pain and swelling   Skin: Negative for color change and rash.   Allergic/Immunologic: Negative for food allergies and immunocompromised state.   Neurological: Negative for dizziness, syncope and headaches.   Hematological: Negative for adenopathy. Does not bruise/bleed easily.   Psychiatric/Behavioral: Positive for sleep disturbance. Negative for hallucinations and suicidal ideas. The patient is not nervous/anxious.        There were no vitals taken for this visit.  Appointment on 12/09/2019   Component Date Value Ref Range Status   • Glucose 12/09/2019 153* 65 - 99 mg/dL Final   • BUN 12/09/2019 8  6 - 20 mg/dL Final   • Creatinine 12/09/2019 0.69  0.57 - 1.00 mg/dL Final   • Sodium 12/09/2019 141  136 - 145 mmol/L Final   • Potassium 12/09/2019 3.8  3.5 - 5.2 mmol/L Final   • Chloride 12/09/2019 104  98 - 107 mmol/L Final   • CO2 12/09/2019 22.1  22.0 - 29.0 mmol/L Final   • Calcium 12/09/2019 8.9  8.6 - 10.5 mg/dL Final   • Total Protein 12/09/2019 7.3  6.0 - 8.5 g/dL Final   • Albumin 12/09/2019 3.80  3.50 - 5.20 g/dL Final   • ALT (SGPT) 12/09/2019 20  1 - 33 U/L Final   • AST (SGOT) 12/09/2019 25  1 - 32 U/L Final   • Alkaline Phosphatase 12/09/2019 65  39 - 117 U/L Final   • Total Bilirubin 12/09/2019 0.4  0.2 - 1.2 mg/dL Final   • eGFR Non African Amer 12/09/2019 87  >60 mL/min/1.73 Final   • Globulin 12/09/2019 3.5  gm/dL  Final   • A/G Ratio 12/09/2019 1.1  g/dL Final   • BUN/Creatinine Ratio 12/09/2019 11.6  7.0 - 25.0 Final   • Anion Gap 12/09/2019 14.9  5.0 - 15.0 mmol/L Final   • Hemoglobin A1C 12/09/2019 7.61* 4.80 - 5.60 % Final   • Total Cholesterol 12/09/2019 172  0 - 200 mg/dL Final   • Triglycerides 12/09/2019 380* 0 - 150 mg/dL Final   • HDL Cholesterol 12/09/2019 24* 40 - 60 mg/dL Final   • LDL Cholesterol  12/09/2019 72  0 - 100 mg/dL Final   • VLDL Cholesterol 12/09/2019 76* 5 - 40 mg/dL Final   • LDL/HDL Ratio 12/09/2019 3.00   Final   • TSH 12/09/2019 1.100  0.270 - 4.200 uIU/mL Final   • Vitamin B-12 12/09/2019 385  211 - 946 pg/mL Final   • 25 Hydroxy, Vitamin D 12/09/2019 20.6* 30.0 - 100.0 ng/ml Final   • WBC 12/09/2019 6.77  3.40 - 10.80 10*3/mm3 Final   • RBC 12/09/2019 4.37  3.77 - 5.28 10*6/mm3 Final   • Hemoglobin 12/09/2019 13.6  12.0 - 15.9 g/dL Final   • Hematocrit 12/09/2019 39.8  34.0 - 46.6 % Final   • MCV 12/09/2019 91.1  79.0 - 97.0 fL Final   • MCH 12/09/2019 31.1  26.6 - 33.0 pg Final   • MCHC 12/09/2019 34.2  31.5 - 35.7 g/dL Final   • RDW 12/09/2019 12.8  12.3 - 15.4 % Final   • RDW-SD 12/09/2019 42.7  37.0 - 54.0 fl Final   • MPV 12/09/2019 10.0  6.0 - 12.0 fL Final   • Platelets 12/09/2019 210  140 - 450 10*3/mm3 Final   • Neutrophil % 12/09/2019 66.3  42.7 - 76.0 % Final   • Lymphocyte % 12/09/2019 24.4  19.6 - 45.3 % Final   • Monocyte % 12/09/2019 5.5  5.0 - 12.0 % Final   • Eosinophil % 12/09/2019 2.7  0.3 - 6.2 % Final   • Basophil % 12/09/2019 0.7  0.0 - 1.5 % Final   • Immature Grans % 12/09/2019 0.4  0.0 - 0.5 % Final   • Neutrophils, Absolute 12/09/2019 4.49  1.70 - 7.00 10*3/mm3 Final   • Lymphocytes, Absolute 12/09/2019 1.65  0.70 - 3.10 10*3/mm3 Final   • Monocytes, Absolute 12/09/2019 0.37  0.10 - 0.90 10*3/mm3 Final   • Eosinophils, Absolute 12/09/2019 0.18  0.00 - 0.40 10*3/mm3 Final   • Basophils, Absolute 12/09/2019 0.05  0.00 - 0.20 10*3/mm3 Final   • Immature Grans, Absolute  12/09/2019 0.03  0.00 - 0.05 10*3/mm3 Final   • nRBC 12/09/2019 0.0  0.0 - 0.2 /100 WBC Final       Physical Exam   Psychiatric: She has a normal mood and affect. Her behavior is normal. Thought content normal.       Assessment/Plan     Diagnoses and all orders for this visit:    Closed fracture of distal end of right radius with delayed healing, unspecified fracture morphology, subsequent encounter  Comments:  Prescription written for Tylenol No. 4 quantity 20  [facility] [id number] reviewed and consistent  Patient advised to destroy Tylenol 3 prescription from orthopedist    We had a long discussion regarding her symptoms and conservative measures.  Her biggest problem is intermittent pain that causes her problem going to sleep and the Tylenol No. 4 should help with this issue.  She was advised to keep follow-up appointment with the orthopedic specialist especially regarding her swelling of the fingers.  She was advised that if symptoms should persist or worsen that she should call back to this office or her orthopedic specialist for further instruction.  At this time she states that she does have good circulation in her fingers although they are swelling after the splint was taken off.        This document has been electronically signed by:  Luisa Hernandez PA-C

## 2020-05-19 DIAGNOSIS — K21.9 GASTROESOPHAGEAL REFLUX DISEASE WITHOUT ESOPHAGITIS: ICD-10-CM

## 2020-05-19 DIAGNOSIS — S62.101A CLOSED FRACTURE OF RIGHT WRIST, INITIAL ENCOUNTER: ICD-10-CM

## 2020-05-19 RX ORDER — ACETAMINOPHEN AND CODEINE PHOSPHATE 60; 300 MG/1; MG/1
1 TABLET ORAL EVERY 6 HOURS PRN
Qty: 20 TABLET | Refills: 0 | Status: SHIPPED | OUTPATIENT
Start: 2020-05-19 | End: 2020-06-05 | Stop reason: SDUPTHER

## 2020-05-19 NOTE — TELEPHONE ENCOUNTER
Patient scheduled for Ortho appointment due to close fracture of the right wrist.  Pain rated 7 on pain scale rating 0-10 during her weight she is requesting a refill on Tylenol with codeine.  I will go ahead and send her in a prescription.

## 2020-05-20 RX ORDER — OMEPRAZOLE 40 MG/1
40 CAPSULE, DELAYED RELEASE ORAL DAILY
Qty: 30 CAPSULE | Refills: 5 | Status: SHIPPED | OUTPATIENT
Start: 2020-05-20 | End: 2020-08-12 | Stop reason: SDUPTHER

## 2020-06-05 ENCOUNTER — OFFICE VISIT (OUTPATIENT)
Dept: FAMILY MEDICINE CLINIC | Facility: CLINIC | Age: 60
End: 2020-06-05

## 2020-06-05 DIAGNOSIS — S62.101D CLOSED FRACTURE OF RIGHT WRIST WITH ROUTINE HEALING, SUBSEQUENT ENCOUNTER: Primary | ICD-10-CM

## 2020-06-05 DIAGNOSIS — S62.101A CLOSED FRACTURE OF RIGHT WRIST, INITIAL ENCOUNTER: ICD-10-CM

## 2020-06-05 PROCEDURE — G2025 DIS SITE TELE SVCS RHC/FQHC: HCPCS | Performed by: PHYSICIAN ASSISTANT

## 2020-06-05 RX ORDER — ACETAMINOPHEN AND CODEINE PHOSPHATE 60; 300 MG/1; MG/1
1 TABLET ORAL EVERY 6 HOURS PRN
Qty: 20 TABLET | Refills: 0 | Status: SHIPPED | OUTPATIENT
Start: 2020-06-05 | End: 2020-07-01

## 2020-06-05 NOTE — PROGRESS NOTES
Subjective   Donna Israel is a 60 y.o. female.     Telephone visit    You have chosen to receive care through a telephone visit. Do you consent to use a telephone visit for your medical care today? Yes    This visit has been rescheduled as a phone visit to comply with patient safety concerns in accordance with [facility] recommendations. Total time of discussion was 11 minutes.      Chief Complaint -wrist pain    History of Present Illness -      Wrist pain-  Patient complains of right wrist pain.  She did have a traumatic injury when she fell on 4/11/2020 fracturing her wrist.  She underwent surgical correction of the right wrist on 4/15/2020.  She states that she is now undergoing physical therapy.  She reports that while the physical therapy is tolerable her fingers swell causing significant pain after finishing physical therapy.  She states that she also has pain in the wrist that prevents her from sleeping well at night.  She is unable to tolerate NSAIDs due to renal function.  Some relief with Tylenol No. 4.    4/11/2020 x-ray of the right wrist revealed angulated but not displaced multipart fracture of the distal radius.  Extension to articular surface is noted.  Joint effusion is present.  Dorsal regulation of the distal fracture fragment.    The following portions of the patient's history were reviewed and updated as appropriate: allergies, current medications, past family history, past medical history, past social history, past surgical history and problem list.    Review of Systems   Constitutional: Negative for activity change, appetite change, fatigue and fever.   HENT: Negative for ear pain, sinus pressure and sore throat.    Eyes: Negative for pain and visual disturbance.   Respiratory: Negative for cough and chest tightness.    Cardiovascular: Negative for chest pain and palpitations.   Gastrointestinal: Negative for abdominal pain, constipation, diarrhea, nausea and vomiting.   Endocrine: Negative for  polydipsia and polyuria.   Genitourinary: Negative for dysuria and frequency.   Musculoskeletal: Positive for arthralgias (wrist pain and swelling) and joint swelling. Negative for back pain and myalgias.   Skin: Negative for color change and rash.   Allergic/Immunologic: Negative for food allergies and immunocompromised state.   Neurological: Negative for dizziness, syncope and headaches.   Hematological: Negative for adenopathy. Does not bruise/bleed easily.   Psychiatric/Behavioral: Negative for hallucinations and suicidal ideas. The patient is not nervous/anxious.        There were no vitals taken for this visit.  Appointment on 12/09/2019   Component Date Value Ref Range Status   • Glucose 12/09/2019 153* 65 - 99 mg/dL Final   • BUN 12/09/2019 8  6 - 20 mg/dL Final   • Creatinine 12/09/2019 0.69  0.57 - 1.00 mg/dL Final   • Sodium 12/09/2019 141  136 - 145 mmol/L Final   • Potassium 12/09/2019 3.8  3.5 - 5.2 mmol/L Final   • Chloride 12/09/2019 104  98 - 107 mmol/L Final   • CO2 12/09/2019 22.1  22.0 - 29.0 mmol/L Final   • Calcium 12/09/2019 8.9  8.6 - 10.5 mg/dL Final   • Total Protein 12/09/2019 7.3  6.0 - 8.5 g/dL Final   • Albumin 12/09/2019 3.80  3.50 - 5.20 g/dL Final   • ALT (SGPT) 12/09/2019 20  1 - 33 U/L Final   • AST (SGOT) 12/09/2019 25  1 - 32 U/L Final   • Alkaline Phosphatase 12/09/2019 65  39 - 117 U/L Final   • Total Bilirubin 12/09/2019 0.4  0.2 - 1.2 mg/dL Final   • eGFR Non African Amer 12/09/2019 87  >60 mL/min/1.73 Final   • Globulin 12/09/2019 3.5  gm/dL Final   • A/G Ratio 12/09/2019 1.1  g/dL Final   • BUN/Creatinine Ratio 12/09/2019 11.6  7.0 - 25.0 Final   • Anion Gap 12/09/2019 14.9  5.0 - 15.0 mmol/L Final   • Hemoglobin A1C 12/09/2019 7.61* 4.80 - 5.60 % Final   • Total Cholesterol 12/09/2019 172  0 - 200 mg/dL Final   • Triglycerides 12/09/2019 380* 0 - 150 mg/dL Final   • HDL Cholesterol 12/09/2019 24* 40 - 60 mg/dL Final   • LDL Cholesterol  12/09/2019 72  0 - 100 mg/dL Final    • VLDL Cholesterol 12/09/2019 76* 5 - 40 mg/dL Final   • LDL/HDL Ratio 12/09/2019 3.00   Final   • TSH 12/09/2019 1.100  0.270 - 4.200 uIU/mL Final   • Vitamin B-12 12/09/2019 385  211 - 946 pg/mL Final   • 25 Hydroxy, Vitamin D 12/09/2019 20.6* 30.0 - 100.0 ng/ml Final   • WBC 12/09/2019 6.77  3.40 - 10.80 10*3/mm3 Final   • RBC 12/09/2019 4.37  3.77 - 5.28 10*6/mm3 Final   • Hemoglobin 12/09/2019 13.6  12.0 - 15.9 g/dL Final   • Hematocrit 12/09/2019 39.8  34.0 - 46.6 % Final   • MCV 12/09/2019 91.1  79.0 - 97.0 fL Final   • MCH 12/09/2019 31.1  26.6 - 33.0 pg Final   • MCHC 12/09/2019 34.2  31.5 - 35.7 g/dL Final   • RDW 12/09/2019 12.8  12.3 - 15.4 % Final   • RDW-SD 12/09/2019 42.7  37.0 - 54.0 fl Final   • MPV 12/09/2019 10.0  6.0 - 12.0 fL Final   • Platelets 12/09/2019 210  140 - 450 10*3/mm3 Final   • Neutrophil % 12/09/2019 66.3  42.7 - 76.0 % Final   • Lymphocyte % 12/09/2019 24.4  19.6 - 45.3 % Final   • Monocyte % 12/09/2019 5.5  5.0 - 12.0 % Final   • Eosinophil % 12/09/2019 2.7  0.3 - 6.2 % Final   • Basophil % 12/09/2019 0.7  0.0 - 1.5 % Final   • Immature Grans % 12/09/2019 0.4  0.0 - 0.5 % Final   • Neutrophils, Absolute 12/09/2019 4.49  1.70 - 7.00 10*3/mm3 Final   • Lymphocytes, Absolute 12/09/2019 1.65  0.70 - 3.10 10*3/mm3 Final   • Monocytes, Absolute 12/09/2019 0.37  0.10 - 0.90 10*3/mm3 Final   • Eosinophils, Absolute 12/09/2019 0.18  0.00 - 0.40 10*3/mm3 Final   • Basophils, Absolute 12/09/2019 0.05  0.00 - 0.20 10*3/mm3 Final   • Immature Grans, Absolute 12/09/2019 0.03  0.00 - 0.05 10*3/mm3 Final   • nRBC 12/09/2019 0.0  0.0 - 0.2 /100 WBC Final       Physical Exam   Psychiatric: She has a normal mood and affect. Her behavior is normal. Thought content normal.       Assessment/Plan     Diagnoses and all orders for this visit:    Closed fracture of right wrist with routine healing, subsequent encounter    Prescription written for Tylenol No. 4 quantity 20 with no refills by   Mirtha Linares reviewed  Continue physical therapy  Patient has a follow-up visit with her orthopedic surgeon on 6/16/2020        This document has been electronically signed by:  Luisa Hernandez PA-C

## 2020-07-01 ENCOUNTER — RESULTS ENCOUNTER (OUTPATIENT)
Dept: FAMILY MEDICINE CLINIC | Facility: CLINIC | Age: 60
End: 2020-07-01

## 2020-07-01 ENCOUNTER — OFFICE VISIT (OUTPATIENT)
Dept: FAMILY MEDICINE CLINIC | Facility: CLINIC | Age: 60
End: 2020-07-01

## 2020-07-01 VITALS
HEIGHT: 69 IN | SYSTOLIC BLOOD PRESSURE: 120 MMHG | TEMPERATURE: 97.5 F | DIASTOLIC BLOOD PRESSURE: 80 MMHG | OXYGEN SATURATION: 96 % | BODY MASS INDEX: 35.1 KG/M2 | WEIGHT: 237 LBS | HEART RATE: 96 BPM

## 2020-07-01 DIAGNOSIS — E55.9 VITAMIN D DEFICIENCY: ICD-10-CM

## 2020-07-01 DIAGNOSIS — I10 ESSENTIAL HYPERTENSION: ICD-10-CM

## 2020-07-01 DIAGNOSIS — Z79.4 TYPE 2 DIABETES MELLITUS WITH DIABETIC PERIPHERAL ANGIOPATHY WITHOUT GANGRENE, WITH LONG-TERM CURRENT USE OF INSULIN (HCC): ICD-10-CM

## 2020-07-01 DIAGNOSIS — E66.01 MORBID OBESITY (HCC): ICD-10-CM

## 2020-07-01 DIAGNOSIS — M79.641 RIGHT HAND PAIN: Primary | ICD-10-CM

## 2020-07-01 DIAGNOSIS — M50.30 DEGENERATION OF INTERVERTEBRAL DISC OF CERVICAL REGION: ICD-10-CM

## 2020-07-01 DIAGNOSIS — M79.641 RIGHT HAND PAIN: ICD-10-CM

## 2020-07-01 DIAGNOSIS — E11.51 TYPE 2 DIABETES MELLITUS WITH DIABETIC PERIPHERAL ANGIOPATHY WITHOUT GANGRENE, WITH LONG-TERM CURRENT USE OF INSULIN (HCC): ICD-10-CM

## 2020-07-01 DIAGNOSIS — E78.2 MIXED HYPERLIPIDEMIA: ICD-10-CM

## 2020-07-01 PROCEDURE — 99214 OFFICE O/P EST MOD 30 MIN: CPT | Performed by: NURSE PRACTITIONER

## 2020-07-01 RX ORDER — ACETAMINOPHEN AND CODEINE PHOSPHATE 300; 30 MG/1; MG/1
1 TABLET ORAL EVERY 4 HOURS PRN
Qty: 30 TABLET | Refills: 0 | Status: SHIPPED | OUTPATIENT
Start: 2020-07-01 | End: 2020-08-12

## 2020-07-01 NOTE — PROGRESS NOTES
Subjective   Donna Israel is a 60 y.o. female.     No chief complaint on file.  Chief complaint  Right wrist pain      History of Present Illness:    Fell day before easter and broke her right wrist. Is in physical therapy. Having extensive pain.  Patient has had a surgical repair of her right wrist.  Patient continues to have difficulty using her wrist.  She has severe pain with any movement or use of her right wrist or hand.  She remains in physical therapy.  Rates her pain as 8 on pain scale rating of 0-10.  Has tried over-the-counter medication and conservative treatment.  Patient is scheduled to follow back up with her orthopedic surgeon in a few weeks.  She is requesting a refill on Tylenol with codeine.  Denies any substance abuse or addiction.  Describes pain as sharp and stinging with radiation up into her elbow and down into her fingertips.  Nothing ever completely stops the pain.        The following portions of the patient's history and ROS were reviewed and updated as appropriate per provider:  Allergies, current medications, past family history, past medical history, past social history, past surgical history and problem list.    Review of Systems   Constitutional: Positive for activity change. Negative for appetite change, fatigue and fever.   HENT: Negative for congestion, sinus pressure, sneezing and sore throat.    Eyes: Negative.    Respiratory: Negative for cough, chest tightness, shortness of breath and wheezing.    Cardiovascular: Negative for chest pain, palpitations and leg swelling.   Gastrointestinal: Negative for abdominal pain, constipation, diarrhea, nausea and vomiting.   Endocrine: Negative.    Genitourinary: Negative for difficulty urinating, dysuria, flank pain and frequency.   Musculoskeletal: Positive for arthralgias and back pain. Negative for gait problem, neck pain and neck stiffness.   Skin: Negative.    Allergic/Immunologic: Positive for environmental allergies. Negative  "for food allergies and immunocompromised state.   Neurological: Negative for dizziness, speech difficulty, light-headedness and headaches.   Hematological: Negative.    Psychiatric/Behavioral: Positive for sleep disturbance (Pain). Negative for agitation, dysphoric mood, self-injury and suicidal ideas.     Diabetes-patient reports she is trying to watch her glucose readings closely.  She currently receives Tresiba 15 units daily and Januvia.  Denies any symptomatic hypoglycemia or hyperglycemia.    Obesity-patient reports that she has been eating a lot more convenience food since her injury.  She has had weight loss over the last 6 months.  She has been working toward a healthier diet.  Her son is helping prepare meals and do grocery shopping.    Degenerative disc disease-patient has osteoarthritis in her low back and knees.    Objective     /80   Pulse 96   Temp 97.5 °F (36.4 °C) (Tympanic)   Ht 175.3 cm (69\")   Wt 108 kg (237 lb)   SpO2 96%   BMI 35.00 kg/m²   Appointment on 12/09/2019   Component Date Value Ref Range Status   • Glucose 12/09/2019 153* 65 - 99 mg/dL Final   • BUN 12/09/2019 8  6 - 20 mg/dL Final   • Creatinine 12/09/2019 0.69  0.57 - 1.00 mg/dL Final   • Sodium 12/09/2019 141  136 - 145 mmol/L Final   • Potassium 12/09/2019 3.8  3.5 - 5.2 mmol/L Final   • Chloride 12/09/2019 104  98 - 107 mmol/L Final   • CO2 12/09/2019 22.1  22.0 - 29.0 mmol/L Final   • Calcium 12/09/2019 8.9  8.6 - 10.5 mg/dL Final   • Total Protein 12/09/2019 7.3  6.0 - 8.5 g/dL Final   • Albumin 12/09/2019 3.80  3.50 - 5.20 g/dL Final   • ALT (SGPT) 12/09/2019 20  1 - 33 U/L Final   • AST (SGOT) 12/09/2019 25  1 - 32 U/L Final   • Alkaline Phosphatase 12/09/2019 65  39 - 117 U/L Final   • Total Bilirubin 12/09/2019 0.4  0.2 - 1.2 mg/dL Final   • eGFR Non African Amer 12/09/2019 87  >60 mL/min/1.73 Final   • Globulin 12/09/2019 3.5  gm/dL Final   • A/G Ratio 12/09/2019 1.1  g/dL Final   • BUN/Creatinine Ratio " 12/09/2019 11.6  7.0 - 25.0 Final   • Anion Gap 12/09/2019 14.9  5.0 - 15.0 mmol/L Final   • Hemoglobin A1C 12/09/2019 7.61* 4.80 - 5.60 % Final   • Total Cholesterol 12/09/2019 172  0 - 200 mg/dL Final   • Triglycerides 12/09/2019 380* 0 - 150 mg/dL Final   • HDL Cholesterol 12/09/2019 24* 40 - 60 mg/dL Final   • LDL Cholesterol  12/09/2019 72  0 - 100 mg/dL Final   • VLDL Cholesterol 12/09/2019 76* 5 - 40 mg/dL Final   • LDL/HDL Ratio 12/09/2019 3.00   Final   • TSH 12/09/2019 1.100  0.270 - 4.200 uIU/mL Final   • Vitamin B-12 12/09/2019 385  211 - 946 pg/mL Final   • 25 Hydroxy, Vitamin D 12/09/2019 20.6* 30.0 - 100.0 ng/ml Final   • WBC 12/09/2019 6.77  3.40 - 10.80 10*3/mm3 Final   • RBC 12/09/2019 4.37  3.77 - 5.28 10*6/mm3 Final   • Hemoglobin 12/09/2019 13.6  12.0 - 15.9 g/dL Final   • Hematocrit 12/09/2019 39.8  34.0 - 46.6 % Final   • MCV 12/09/2019 91.1  79.0 - 97.0 fL Final   • MCH 12/09/2019 31.1  26.6 - 33.0 pg Final   • MCHC 12/09/2019 34.2  31.5 - 35.7 g/dL Final   • RDW 12/09/2019 12.8  12.3 - 15.4 % Final   • RDW-SD 12/09/2019 42.7  37.0 - 54.0 fl Final   • MPV 12/09/2019 10.0  6.0 - 12.0 fL Final   • Platelets 12/09/2019 210  140 - 450 10*3/mm3 Final   • Neutrophil % 12/09/2019 66.3  42.7 - 76.0 % Final   • Lymphocyte % 12/09/2019 24.4  19.6 - 45.3 % Final   • Monocyte % 12/09/2019 5.5  5.0 - 12.0 % Final   • Eosinophil % 12/09/2019 2.7  0.3 - 6.2 % Final   • Basophil % 12/09/2019 0.7  0.0 - 1.5 % Final   • Immature Grans % 12/09/2019 0.4  0.0 - 0.5 % Final   • Neutrophils, Absolute 12/09/2019 4.49  1.70 - 7.00 10*3/mm3 Final   • Lymphocytes, Absolute 12/09/2019 1.65  0.70 - 3.10 10*3/mm3 Final   • Monocytes, Absolute 12/09/2019 0.37  0.10 - 0.90 10*3/mm3 Final   • Eosinophils, Absolute 12/09/2019 0.18  0.00 - 0.40 10*3/mm3 Final   • Basophils, Absolute 12/09/2019 0.05  0.00 - 0.20 10*3/mm3 Final   • Immature Grans, Absolute 12/09/2019 0.03  0.00 - 0.05 10*3/mm3 Final   • nRBC 12/09/2019 0.0  0.0  - 0.2 /100 WBC Final       Physical Exam   Constitutional: She is oriented to person, place, and time. Vital signs are normal. She appears well-developed and well-nourished. No distress.   Visibly obese.    HENT:   Head: Normocephalic and atraumatic.   Right Ear: External ear normal.   Left Ear: External ear normal.   Nose: Nose normal.   Mouth/Throat: Oropharynx is clear and moist. No oropharyngeal exudate.   Eyes: Pupils are equal, round, and reactive to light. Conjunctivae, EOM and lids are normal. Right eye exhibits no discharge. Left eye exhibits no discharge.   Neck: Normal range of motion. Neck supple. No tracheal deviation present. No thyromegaly present.   Cardiovascular: Normal rate, regular rhythm, normal heart sounds and intact distal pulses. Exam reveals no gallop and no friction rub.   No murmur heard.  Pulmonary/Chest: Effort normal and breath sounds normal. No respiratory distress. She has no wheezes. She has no rales. She exhibits no tenderness.   Abdominal: Soft. Normal appearance and bowel sounds are normal. She exhibits no distension and no mass. There is no tenderness. There is no rebound and no guarding.   Musculoskeletal:        Right wrist: She exhibits decreased range of motion and tenderness.        Lumbar back: She exhibits tenderness.        Arms:  Generalized stiffness in the low back and bilateral hips   Lymphadenopathy:     She has no cervical adenopathy.   Neurological: She is alert and oriented to person, place, and time. She has normal reflexes.   CN 2-12 grossly intact    Skin: Skin is warm, dry and intact. Capillary refill takes less than 2 seconds. No bruising, no burn, no petechiae and no rash noted. She is not diaphoretic. No erythema. No pallor. Nails show no clubbing.   Psychiatric: She has a normal mood and affect. Her speech is normal and behavior is normal. Judgment and thought content normal. Cognition and memory are normal.   Vitals reviewed.      Assessment/Plan          Problem List Items Addressed This Visit        Cardiovascular and Mediastinum    Type 2 diabetes mellitus with circulatory disorder, with long-term current use of insulin (CMS/[facility])    Relevant Orders    CBC & Differential    Comprehensive Metabolic Panel    TSH    Hemoglobin A1c    Vitamin D 25 Hydroxy    Vitamin B12    Lipid Panel    MicroAlbumin, Urine, Random - Urine, Clean Catch    Hyperlipidemia    Relevant Orders    CBC & Differential    Comprehensive Metabolic Panel    TSH    Hemoglobin A1c    Vitamin D 25 Hydroxy    Vitamin B12    Lipid Panel    MicroAlbumin, Urine, Random - Urine, Clean Catch    Essential hypertension    Relevant Orders    CBC & Differential    Comprehensive Metabolic Panel    TSH    Hemoglobin A1c    Vitamin D 25 Hydroxy    Vitamin B12    Lipid Panel    MicroAlbumin, Urine, Random - Urine, Clean Catch       Digestive    Morbid obesity (CMS/[facility])    Relevant Orders    CBC & Differential    Comprehensive Metabolic Panel    TSH    Hemoglobin A1c    Vitamin D 25 Hydroxy    Vitamin B12    Lipid Panel    MicroAlbumin, Urine, Random - Urine, Clean Catch    Vitamin D deficiency    Relevant Orders    CBC & Differential    Comprehensive Metabolic Panel    TSH    Hemoglobin A1c    Vitamin D 25 Hydroxy    Vitamin B12    Lipid Panel    MicroAlbumin, Urine, Random - Urine, Clean Catch       Nervous and Auditory    Right hand pain - Primary    Relevant Medications    acetaminophen-codeine (TYLENOL #3) 300-30 MG per tablet    Other Relevant Orders    Urine Drug Screen - Urine, Clean Catch       Musculoskeletal and Integument    Degeneration of intervertebral disc of cervical region          Recent emergency room and orthopedic consult notes reviewed and discussed.    Proper body mechanics has been reviewed and discussed today.  As part of this patient's treatment plan they are being prescribed controlled substance/substances with potential for abuse. This patient has been made aware of the appropriate  use of such medications, including potential risk of somnolence, limited ability to drive and / or work safely, and potential for overdose. It has also been made clear that these medications are for use by this patient only, without concomitant use of alcohol or other substances unless prescribed/advised by medical provider. Patient has completed controlled substance agreement detailing terms of continued prescribing of controlled substances including monitoring Vijay reports, drug screens and pill counts. The patient was asked and states they are not receiving narcotic medication from any there provider or any provider that this office has not been made aware of. History and physical exam exhibit continued safe and appropriate use of controlled substances.   Goal: Improved quality of life and reduction in pain as evidenced by pt report.   Vijay #[id number] has been reviewed as consistent.  UDS is quested today  Provided patient with a prescription for Tylenol 3 may take 1 every 4 hours on as-needed basis #30 with no refill.  Pt is at low risk for substance abuse or addiction. Pt will be monitored closely for compliance and the need to continue plan of care.  She has been educated regarding possible side effects and interactions of controlled medications.  Patient has been instructed and counseled regarding opioid misuse and risk of addiction.  We have discussed proper storage and disposal of controlled medication.    We will go ahead and order routine fasting labs.    Coronavirus precautions have been reviewed and discussed.  I have discussed the CDC recommendations  of social distancing, hand washing, mask wearing and disinfecting commonly touched items. Reviewed need to notify PCP and self quarantine with mild symptoms.  Discussed procedure to obtain Covid-19 testing and notification of PCP/health dept/ED/Urgent Center if symptoms begin. Understanding verbalized.     Patient's Body mass index is 35 kg/m². BMI is  above normal parameters. Recommendations include: exercise counseling and nutrition counseling.    Donna Israel  reports that she has been smoking cigarettes. She has been smoking about 0.50 packs per day. She has never used smokeless tobacco.. I have educated her on the risk of diseases from using tobacco products such as cancer, COPD and heart diease.     I advised her to quit and she is not willing to quit.    I spent 3  minutes counseling the patient.     Pt has been educated/instructed on diabetic care and protocols.  Diabetic diet instructions provided.  Medication regimen reviewed.  Discussed possible side effects and interactions of current medications.  Sick day rules reviewed. Continue to monitor blood sugar and report abnormal readings as discussed today. Understanding stated by patient.       I have discussed diagnosis in detail today allowing time for questions and answers. Patient is aware of reasons to seek urgent or emergent medical care as well as reasons to return to the clinic for evaluation. Possible side effects, interactions and progression of symptoms discussed as well. Patient / family states understanding.   Emotional support and active listening provided.       I would like to see her back in 1 month, sooner if needed.  Fasting labs 1 week prior.      This document has been electronically signed by:  BRIAN Arrieta, NP-C

## 2020-08-12 ENCOUNTER — OFFICE VISIT (OUTPATIENT)
Dept: FAMILY MEDICINE CLINIC | Facility: CLINIC | Age: 60
End: 2020-08-12

## 2020-08-12 DIAGNOSIS — Z79.4 TYPE 2 DIABETES MELLITUS WITH DIABETIC PERIPHERAL ANGIOPATHY WITHOUT GANGRENE, WITH LONG-TERM CURRENT USE OF INSULIN (HCC): ICD-10-CM

## 2020-08-12 DIAGNOSIS — E03.9 ACQUIRED HYPOTHYROIDISM: ICD-10-CM

## 2020-08-12 DIAGNOSIS — K21.9 GASTROESOPHAGEAL REFLUX DISEASE WITHOUT ESOPHAGITIS: ICD-10-CM

## 2020-08-12 DIAGNOSIS — E11.49 OTHER DIABETIC NEUROLOGICAL COMPLICATION ASSOCIATED WITH TYPE 2 DIABETES MELLITUS (HCC): ICD-10-CM

## 2020-08-12 DIAGNOSIS — E11.51 TYPE 2 DIABETES MELLITUS WITH DIABETIC PERIPHERAL ANGIOPATHY WITHOUT GANGRENE, WITH LONG-TERM CURRENT USE OF INSULIN (HCC): ICD-10-CM

## 2020-08-12 DIAGNOSIS — E55.9 VITAMIN D DEFICIENCY: Primary | ICD-10-CM

## 2020-08-12 PROCEDURE — G2025 DIS SITE TELE SVCS RHC/FQHC: HCPCS | Performed by: NURSE PRACTITIONER

## 2020-08-12 RX ORDER — LEVOTHYROXINE SODIUM 200 UG/1
TABLET ORAL
Qty: 90 TABLET | Refills: 0 | Status: SHIPPED | OUTPATIENT
Start: 2020-08-12

## 2020-08-12 RX ORDER — PROMETHAZINE HYDROCHLORIDE 25 MG/1
25 TABLET ORAL EVERY 8 HOURS PRN
Qty: 20 TABLET | Refills: 5 | Status: SHIPPED | OUTPATIENT
Start: 2020-08-12

## 2020-08-12 RX ORDER — OMEPRAZOLE 40 MG/1
40 CAPSULE, DELAYED RELEASE ORAL DAILY
Qty: 30 CAPSULE | Refills: 5 | Status: SHIPPED | OUTPATIENT
Start: 2020-08-12 | End: 2020-09-21

## 2020-08-12 RX ORDER — LEVOTHYROXINE SODIUM 0.2 MG/1
200 TABLET ORAL DAILY
Qty: 90 TABLET | Refills: 1 | Status: SHIPPED | OUTPATIENT
Start: 2020-08-12

## 2020-08-12 RX ORDER — LANCETS 28 GAUGE
EACH MISCELLANEOUS
Qty: 100 EACH | Refills: 12 | Status: SHIPPED | OUTPATIENT
Start: 2020-08-12

## 2020-08-12 NOTE — PROGRESS NOTES
Establish patient called office of APRN today to discuss:   CC        You have chosen to receive care through a telephone visit today. Do you consent to use a telephone visit for your medical care today? Yes     Brief HPI/ROS obtained as follows:    Continues to have pain in her right wrist.Is doing PT twice weekly and under the care of ortho. Reports no improvement. She does report that the Neurontin is helping mildly with her pain. Pain is described as sharp, stinging and radiation up into her elbow and down her fingertips.     Neuropathy - been to podiatry, scheduled for [facility]. Started on Neurontin 600 mg tid .     Followed up with surgeon last week and is beng sent to a pain clinic for chronic pain management. Does not need a refill on tylenol with codeine at this time as she is trying to not use unless becomes severe.     Dm- admits to non-compliance.       Multiple chronic diagnosis which include hypothyroidism, gerd, DM, hyperlipidemia, hypertension, palpitations and vid d deficiency.       Review of Systems   Constitutional: Positive for activity change. Negative for appetite change, chills, fatigue and fever.   HENT: Negative for congestion, sinus pressure, sinus pain, sneezing and sore throat.    Eyes: Negative for pain, discharge and itching.   Respiratory: Negative for cough, chest tightness and shortness of breath.    Cardiovascular: Negative.    Gastrointestinal: Negative for abdominal pain, constipation, diarrhea, nausea and vomiting.   Endocrine: Negative.    Genitourinary: Negative for dysuria, flank pain and frequency.   Musculoskeletal: Positive for arthralgias, back pain and gait problem.   Skin: Negative.    Allergic/Immunologic: Positive for environmental allergies. Negative for food allergies.   Neurological: Positive for weakness (arm), numbness (feet) and headaches (at times, no changes in pattern or intensity ). Negative for seizures, facial asymmetry and speech difficulty.   Hematological:  Negative.    Psychiatric/Behavioral: Positive for sleep disturbance (pain). Negative for confusion, self-injury and suicidal ideas.       The current allergy list and medication list was reviewed with patient for accuracy.     Assessment     Alert and oriented x3.  Respirations not labored with conversation.  No cough.  Speech normal.  Hearing adequate.  Cooperative.  Mood normal.  Thought process normal.  She is very talkative and friendly today.     Diagnoses and all orders for this visit:    Vitamin D deficiency  -     Vitamin D 25 Hydroxy; Future    Acquired hypothyroidism  -     levothyroxine (SYNTHROID, LEVOTHROID) 200 MCG tablet; Take 1 tablet by mouth Daily.  -     CBC & Differential; Future  -     Comprehensive Metabolic Panel; Future  -     TSH; Future  -     Hemoglobin A1c; Future  -     Vitamin D 25 Hydroxy; Future  -     Vitamin B12; Future  -     Lipid Panel; Future  -     MicroAlbumin, Urine, Random - Urine, Clean Catch; Future    Gastroesophageal reflux disease without esophagitis  -     omeprazole (priLOSEC) 40 MG capsule; Take 1 capsule by mouth Daily.  -     promethazine (PHENERGAN) 25 MG tablet; Take 1 tablet by mouth Every 8 (Eight) Hours As Needed for Nausea or Vomiting.  -     CBC & Differential; Future  -     Comprehensive Metabolic Panel; Future  -     TSH; Future  -     Hemoglobin A1c; Future  -     Vitamin D 25 Hydroxy; Future  -     Vitamin B12; Future  -     Lipid Panel; Future  -     MicroAlbumin, Urine, Random - Urine, Clean Catch; Future    Type 2 diabetes mellitus with diabetic peripheral angiopathy without gangrene, with long-term current use of insulin (CMS/[facility])  -     Insulin Pen Needle (BD Pen Needle Yuki U/F) 32G X 4 MM misc; 1 Device Daily.  -     glucose blood (Cool Blood Glucose Test Strips) test strip; Check sugar 3x times daily.  -     insulin degludec (Tresiba FlexTouch) 100 UNIT/ML solution pen-injector injection; Inject 15 Units under the skin into the appropriate area  as directed Daily.  -     Lancets (FREESTYLE) lancets; Test tid  -     CBC & Differential; Future  -     Comprehensive Metabolic Panel; Future  -     TSH; Future  -     Hemoglobin A1c; Future  -     Vitamin D 25 Hydroxy; Future  -     Vitamin B12; Future  -     Lipid Panel; Future  -     MicroAlbumin, Urine, Random - Urine, Clean Catch; Future    Other diabetic neurological complication associated with type 2 diabetes mellitus (CMS/HCC)  -     Insulin Pen Needle (BD Pen Needle Yuki U/F) 32G X 4 MM misc; 1 Device Daily.  -     glucose blood (Cool Blood Glucose Test Strips) test strip; Check sugar 3x times daily.  -     insulin degludec (Tresiba FlexTouch) 100 UNIT/ML solution pen-injector injection; Inject 15 Units under the skin into the appropriate area as directed Daily.  -     Lancets (FREESTYLE) lancets; Test tid  -     SITagliptin (Januvia) 100 MG tablet; Take 1 tablet by mouth Daily.  -     CBC & Differential; Future  -     Comprehensive Metabolic Panel; Future  -     TSH; Future  -     Hemoglobin A1c; Future  -     Vitamin D 25 Hydroxy; Future  -     Vitamin B12; Future  -     Lipid Panel; Future  -     MicroAlbumin, Urine, Random - Urine, Clean Catch; Future    Pt has been educated/instructed on diabetic care and protocols.  Diabetic diet instructions provided.  Medication regimen reviewed.  Discussed possible side effects and interactions of current medications.  Sick day rules reviewed. Continue to monitor blood sugar and report abnormal readings as discussed today. Understanding stated by patient.     Pt has been instructed today regarding low fat heart smart diet. Weight management and routine exercise has been recommended. Avoid high fat foods, starchy foods and processed foods. Increase lean meats, fresh vegetables and fresh fruits.      Coronavirus precautions have been reviewed and discussed.  I have discussed the CDC recommendations  of social distancing, hand washing, wearing mask and disinfecting  commonly touched items. Reviewed need to notify PCP and self quarantine with mild symptoms.  Discussed procedure to obtain Covid-19 testing and notification of PCP/health dept/ED/Urgent Center if symptoms begin. Understanding verbalized.    Patient instructed and advised to call if symptoms are increasing or new symptoms occur.    Understands reasons for urgent and emergent care.  Patient (& family) verbalized agreement for treatment plan.       Follow up in 3 months. Routine labs fasting one week prior to next office visit. Return sooner if needed.       Current Outpatient Medications:   •  B-D ULTRAFINE III SHORT PEN 31G X 8 MM misc, USE ONE PEN NEEDLE TWICE DAILY, Disp: 100 each, Rfl: 0  •  Blood Glucose Monitoring Suppl kit, 1 kit 2 (Two) Times a Day., Disp: 1 each, Rfl: 0  •  glucose blood (Cool Blood Glucose Test Strips) test strip, Check sugar 3x times daily., Disp: 100 each, Rfl: 12  •  insulin degludec (Tresiba FlexTouch) 100 UNIT/ML solution pen-injector injection, Inject 15 Units under the skin into the appropriate area as directed Daily., Disp: 6 pen, Rfl: 3  •  Insulin Pen Needle (BD Pen Needle Yuki U/F) 32G X 4 MM misc, 1 Device Daily., Disp: 30 each, Rfl: 5  •  Lancets (FREESTYLE) lancets, Test tid, Disp: 100 each, Rfl: 12  •  levothyroxine (SYNTHROID, LEVOTHROID) 200 MCG tablet, Take 1 tablet by mouth Daily., Disp: 90 tablet, Rfl: 1  •  omeprazole (priLOSEC) 40 MG capsule, Take 1 capsule by mouth Daily., Disp: 30 capsule, Rfl: 5  •  promethazine (PHENERGAN) 25 MG tablet, Take 1 tablet by mouth Every 8 (Eight) Hours As Needed for Nausea or Vomiting., Disp: 20 tablet, Rfl: 5  •  SITagliptin (Januvia) 100 MG tablet, Take 1 tablet by mouth Daily., Disp: 30 tablet, Rfl: 5    This visit has been rescheduled as a phone visit to comply with patient safety concerns in accordance with CDC recommendations. Total time of discussion was 12 minutes.

## 2020-09-20 DIAGNOSIS — K21.9 GASTROESOPHAGEAL REFLUX DISEASE WITHOUT ESOPHAGITIS: ICD-10-CM

## 2020-09-21 RX ORDER — OMEPRAZOLE 40 MG/1
CAPSULE, DELAYED RELEASE ORAL
Qty: 90 CAPSULE | Refills: 1 | Status: SHIPPED | OUTPATIENT
Start: 2020-09-21

## 2020-10-19 RX ORDER — PEN NEEDLE, DIABETIC 30 GX3/16"
NEEDLE, DISPOSABLE MISCELLANEOUS
Qty: 100 EACH | Refills: 5 | Status: SHIPPED | OUTPATIENT
Start: 2020-10-19

## 2020-11-04 NOTE — ASSESSMENT & PLAN NOTE
Hypertension is improving with lifestyle modifications.  Continue current treatment regimen.  Dietary sodium restriction.  Weight loss.  Stop smoking.  Blood pressure will be reassessed in 3 months.   yes

## 2021-02-22 DIAGNOSIS — Z23 IMMUNIZATION DUE: ICD-10-CM

## 2021-04-27 ENCOUNTER — TRANSCRIBE ORDERS (OUTPATIENT)
Dept: ADMINISTRATIVE | Facility: HOSPITAL | Age: 61
End: 2021-04-27

## 2021-04-27 DIAGNOSIS — F17.210 NICOTINE DEPENDENCE, CIGARETTES, UNCOMPLICATED: Primary | ICD-10-CM

## 2021-04-30 ENCOUNTER — HOSPITAL ENCOUNTER (OUTPATIENT)
Dept: MAMMOGRAPHY | Facility: HOSPITAL | Age: 61
Discharge: HOME | End: 2021-04-30

## 2021-04-30 ENCOUNTER — HOSPITAL ENCOUNTER (OUTPATIENT)
Dept: BONE DENSITY | Facility: HOSPITAL | Age: 61
Discharge: HOME | End: 2021-04-30

## 2021-04-30 DIAGNOSIS — Z78.0 POSTMENOPAUSAL: ICD-10-CM

## 2021-04-30 DIAGNOSIS — Z12.31 VISIT FOR SCREENING MAMMOGRAM: ICD-10-CM

## 2021-04-30 PROCEDURE — 77080 DXA BONE DENSITY AXIAL: CPT | Performed by: RADIOLOGY

## 2021-04-30 PROCEDURE — 77067 SCR MAMMO BI INCL CAD: CPT | Performed by: RADIOLOGY

## 2021-04-30 PROCEDURE — 77063 BREAST TOMOSYNTHESIS BI: CPT

## 2021-04-30 PROCEDURE — 77080 DXA BONE DENSITY AXIAL: CPT

## 2021-04-30 PROCEDURE — 77063 BREAST TOMOSYNTHESIS BI: CPT | Performed by: RADIOLOGY

## 2021-04-30 PROCEDURE — 77067 SCR MAMMO BI INCL CAD: CPT

## 2021-05-12 ENCOUNTER — HOSPITAL ENCOUNTER (OUTPATIENT)
Dept: CT IMAGING | Facility: HOSPITAL | Age: 61
Discharge: HOME | End: 2021-05-12
Admitting: NURSE PRACTITIONER

## 2021-05-12 DIAGNOSIS — F17.210 NICOTINE DEPENDENCE, CIGARETTES, UNCOMPLICATED: ICD-10-CM

## 2021-05-12 PROCEDURE — 71271 CT THORAX LUNG CANCER SCR C-: CPT | Performed by: RADIOLOGY

## 2021-05-12 PROCEDURE — 71271 CT THORAX LUNG CANCER SCR C-: CPT

## 2021-06-03 ENCOUNTER — HOSPITAL ENCOUNTER (OUTPATIENT)
Dept: MAMMOGRAPHY | Facility: HOSPITAL | Age: 61
Discharge: HOME | End: 2021-06-03
Admitting: RADIOLOGY

## 2021-06-03 DIAGNOSIS — R92.8 ABNORMAL MAMMOGRAM: ICD-10-CM

## 2021-06-03 PROCEDURE — G0279 TOMOSYNTHESIS, MAMMO: HCPCS | Performed by: RADIOLOGY

## 2021-06-03 PROCEDURE — 77065 DX MAMMO INCL CAD UNI: CPT | Performed by: RADIOLOGY

## 2021-06-03 PROCEDURE — 77065 DX MAMMO INCL CAD UNI: CPT

## 2021-06-03 PROCEDURE — G0279 TOMOSYNTHESIS, MAMMO: HCPCS

## 2021-06-29 ENCOUNTER — HOSPITAL ENCOUNTER (OUTPATIENT)
Dept: MAMMOGRAPHY | Facility: HOSPITAL | Age: 61
Discharge: HOME | End: 2021-06-29

## 2021-06-29 DIAGNOSIS — R92.8 ABNORMAL MAMMOGRAM: ICD-10-CM

## 2021-06-29 PROCEDURE — 77065 DX MAMMO INCL CAD UNI: CPT | Performed by: RADIOLOGY

## 2021-06-29 PROCEDURE — 19081 BX BREAST 1ST LESION STRTCTC: CPT | Performed by: RADIOLOGY

## 2021-06-29 PROCEDURE — 88305 TISSUE EXAM BY PATHOLOGIST: CPT | Performed by: RADIOLOGY

## 2021-06-29 PROCEDURE — A4648 IMPLANTABLE TISSUE MARKER: HCPCS

## 2021-06-30 LAB — LAB AP CASE REPORT: NORMAL

## 2023-01-18 ENCOUNTER — TRANSCRIBE ORDERS (OUTPATIENT)
Dept: ADMINISTRATIVE | Facility: HOSPITAL | Age: 63
End: 2023-01-18
Payer: MEDICARE

## 2023-01-18 DIAGNOSIS — Z12.31 ENCOUNTER FOR SCREENING MAMMOGRAM FOR MALIGNANT NEOPLASM OF BREAST: Primary | ICD-10-CM

## 2023-02-23 ENCOUNTER — HOSPITAL ENCOUNTER (OUTPATIENT)
Dept: MAMMOGRAPHY | Facility: HOSPITAL | Age: 63
Discharge: HOME | End: 2023-02-23
Admitting: RADIOLOGY
Payer: MEDICARE

## 2023-02-23 DIAGNOSIS — R92.8 ABNORMAL MAMMOGRAM: ICD-10-CM

## 2023-02-23 PROCEDURE — G0279 TOMOSYNTHESIS, MAMMO: HCPCS

## 2023-02-23 PROCEDURE — 77066 DX MAMMO INCL CAD BI: CPT | Performed by: RADIOLOGY

## 2023-02-23 PROCEDURE — 77066 DX MAMMO INCL CAD BI: CPT

## 2023-02-23 PROCEDURE — G0279 TOMOSYNTHESIS, MAMMO: HCPCS | Performed by: RADIOLOGY

## 2023-02-28 NOTE — TELEPHONE ENCOUNTER
Called and d/c atorvastatin. Called and let patient know to not take the atorvastatin but to be taking the gemfibrozil. PKF Ashu

## 2023-10-05 ENCOUNTER — SPECIALTY PHARMACY (OUTPATIENT)
Dept: PHARMACY | Facility: HOSPITAL | Age: 63
End: 2023-10-05
Payer: MEDICARE

## 2023-10-05 ENCOUNTER — OFFICE VISIT (OUTPATIENT)
Dept: ENDOCRINOLOGY | Facility: CLINIC | Age: 63
End: 2023-10-05
Payer: MEDICARE

## 2023-10-05 VITALS
DIASTOLIC BLOOD PRESSURE: 70 MMHG | OXYGEN SATURATION: 97 % | BODY MASS INDEX: 32.5 KG/M2 | WEIGHT: 219.4 LBS | HEART RATE: 96 BPM | HEIGHT: 69 IN | SYSTOLIC BLOOD PRESSURE: 128 MMHG

## 2023-10-05 DIAGNOSIS — N18.2 TYPE 2 DIABETES MELLITUS WITH STAGE 2 CHRONIC KIDNEY DISEASE, WITHOUT LONG-TERM CURRENT USE OF INSULIN: Primary | ICD-10-CM

## 2023-10-05 DIAGNOSIS — E11.22 TYPE 2 DIABETES MELLITUS WITH STAGE 2 CHRONIC KIDNEY DISEASE, WITHOUT LONG-TERM CURRENT USE OF INSULIN: Primary | ICD-10-CM

## 2023-10-05 DIAGNOSIS — E11.65 TYPE 2 DIABETES MELLITUS WITH HYPERGLYCEMIA, WITHOUT LONG-TERM CURRENT USE OF INSULIN: Primary | ICD-10-CM

## 2023-10-05 LAB
EXPIRATION DATE: NORMAL
GLUCOSE BLDC GLUCOMTR-MCNC: 330 MG/DL (ref 70–130)
HBA1C MFR BLD: 11.5 %
Lab: NORMAL

## 2023-10-05 PROCEDURE — 86341 ISLET CELL ANTIBODY: CPT | Performed by: NURSE PRACTITIONER

## 2023-10-05 RX ORDER — SEMAGLUTIDE 1.34 MG/ML
0.5 INJECTION, SOLUTION SUBCUTANEOUS WEEKLY
COMMUNITY

## 2023-10-05 RX ORDER — LEVOTHYROXINE SODIUM 0.15 MG/1
150 TABLET ORAL DAILY
COMMUNITY

## 2023-10-05 RX ORDER — LISINOPRIL 2.5 MG/1
2.5 TABLET ORAL DAILY
COMMUNITY

## 2023-10-05 RX ORDER — LANCETS 30 GAUGE
1 EACH MISCELLANEOUS 2 TIMES DAILY
Qty: 150 EACH | Refills: 3 | Status: SHIPPED | OUTPATIENT
Start: 2023-10-05 | End: 2023-10-05 | Stop reason: SDUPTHER

## 2023-10-05 RX ORDER — BLOOD-GLUCOSE METER
1 KIT MISCELLANEOUS ONCE
Qty: 1 EACH | Refills: 0 | Status: SHIPPED | OUTPATIENT
Start: 2023-10-05 | End: 2023-10-05 | Stop reason: SDUPTHER

## 2023-10-05 RX ORDER — PEN NEEDLE, DIABETIC 30 GX3/16"
1 NEEDLE, DISPOSABLE MISCELLANEOUS NIGHTLY
Qty: 100 EACH | Refills: 5 | Status: SHIPPED | OUTPATIENT
Start: 2023-10-05

## 2023-10-05 RX ORDER — BLOOD-GLUCOSE METER
EACH MISCELLANEOUS
Qty: 1 KIT | Refills: 0 | Status: SHIPPED | OUTPATIENT
Start: 2023-10-05

## 2023-10-05 RX ORDER — LANCETS
EACH MISCELLANEOUS
Qty: 100 EACH | Refills: 5 | Status: SHIPPED | OUTPATIENT
Start: 2023-10-05

## 2023-10-05 RX ORDER — INSULIN DEGLUDEC INJECTION 100 U/ML
20 INJECTION, SOLUTION SUBCUTANEOUS NIGHTLY
Qty: 15 ML | Refills: 2 | Status: SHIPPED | OUTPATIENT
Start: 2023-10-05 | End: 2023-10-05

## 2023-10-05 RX ORDER — INSULIN DEGLUDEC INJECTION 100 U/ML
20 INJECTION, SOLUTION SUBCUTANEOUS NIGHTLY
Qty: 6 ML | Refills: 5 | Status: SHIPPED | OUTPATIENT
Start: 2023-10-05

## 2023-10-05 NOTE — PROGRESS NOTES
Chief Complaint   Patient presents with    Diabetes        Referring Provider  No ref. provider found     HPI   Donna Israel is a 63 y.o. female had concerns including Diabetes.    Seen as a new patient.  T2DM.    Diabetes was diagnosed 1990's.  Complications include neuropathy.  Last ophtho exam was due.  Current medications for diabetes include Ozempic 0.25 mg weekly.  Previous meds: Metformin, Januvia, Tresiba  She checks her blood sugar rarely.   Hypos:none    States that her father had T1DM.  She is curious if she may have this too.    ACE/ARB:yes, Statin: no  Labs:  Lipid Panel:utd  ALVERTO: utd     The following portions of the patient's history were reviewed and updated as appropriate: allergies, current medications, past family history, past medical history, past social history, past surgical history, and problem list.    Diet: she tries to limit her carbs, she has not been limiting sugars    Past Medical History:   Diagnosis Date    Arthritis     osteo    Edema     GERD (gastroesophageal reflux disease)     Hyperlipidemia     Hypertension     Hypothyroidism     Inj musc/tend the rotator cuff of right shoulder, subs     Lumbago     Memory loss     Obesity     Osteopenia     Pain in right shoulder     Stiffness of right shoulder joint     Tobacco use disorder     Type 2 diabetes mellitus     Vitamin D deficiency      Past Surgical History:   Procedure Laterality Date    ROTATOR CUFF REPAIR Right     TUBAL ABDOMINAL LIGATION        Family History   Problem Relation Age of Onset    Arthritis Mother     COPD Mother     Hyperlipidemia Mother     Hypertension Mother     Thyroid disease Mother     Alcohol abuse Father     Arthritis Father     Cancer Father     Diabetes Father     Hyperlipidemia Father     Arthritis Sister     COPD Sister     Diabetes Sister     Heart disease Sister     Hyperlipidemia Sister     Hypertension Sister     Thyroid disease Sister     Arthritis Sister     Diabetes Sister      Hyperlipidemia Sister     Thyroid disease Sister     Thyroid disease Sister     Diabetes Brother     Hyperlipidemia Brother     Hypertension Brother     Heart disease Maternal Grandmother     Breast cancer Maternal Aunt     Breast cancer Maternal Aunt       Social History     Socioeconomic History    Marital status:     Number of children: 3    Highest education level: GED or equivalent   Tobacco Use    Smoking status: Every Day     Packs/day: 0.50     Types: Cigarettes    Smokeless tobacco: Never   Substance and Sexual Activity    Alcohol use: No    Drug use: No    Sexual activity: Not Currently     Partners: Male     Birth control/protection: None      Allergies   Allergen Reactions    Ibuprofen Urinary Retention      Current Outpatient Medications on File Prior to Visit   Medication Sig Dispense Refill    Blood Glucose Monitoring Suppl kit 1 kit 2 (Two) Times a Day. (Patient not taking: Reported on 10/5/2023) 1 each 0    omeprazole (priLOSEC) 40 MG capsule Take 1 capsule by mouth once daily 90 capsule 1    SITagliptin (Januvia) 100 MG tablet Take 1 tablet by mouth Daily. (Patient not taking: Reported on 10/5/2023) 30 tablet 5    [DISCONTINUED] EUTHYROX 200 MCG tablet Take 1 tablet by mouth once daily 90 tablet 0    [DISCONTINUED] glucose blood (Cool Blood Glucose Test Strips) test strip Check sugar 3x times daily. (Patient not taking: Reported on 10/5/2023) 100 each 12    [DISCONTINUED] insulin degludec (Tresiba FlexTouch) 100 UNIT/ML solution pen-injector injection Inject 15 Units under the skin into the appropriate area as directed Daily. (Patient not taking: Reported on 10/5/2023) 6 pen 3    [DISCONTINUED] Insulin Pen Needle (Pen Needles) 31G X 8 MM misc Use tid when insulin (Patient not taking: Reported on 10/5/2023) 100 each 5    [DISCONTINUED] Lancets (FREESTYLE) lancets Test tid (Patient not taking: Reported on 10/5/2023) 100 each 12    [DISCONTINUED] levothyroxine (SYNTHROID, LEVOTHROID) 200 MCG  "tablet Take 1 tablet by mouth Daily. 90 tablet 1    [DISCONTINUED] promethazine (PHENERGAN) 25 MG tablet Take 1 tablet by mouth Every 8 (Eight) Hours As Needed for Nausea or Vomiting. 20 tablet 5     No current facility-administered medications on file prior to visit.        Review of Systems   Constitutional:  Positive for diaphoresis, fatigue and unexpected weight loss. Negative for unexpected weight gain.   Eyes:  Positive for blurred vision.   Endocrine: Positive for polydipsia and polyuria. Negative for polyphagia.   Psychiatric/Behavioral:  Positive for sleep disturbance.    All other systems reviewed and are negative.     /70 (BP Location: Left arm, Patient Position: Sitting, Cuff Size: Adult)   Pulse 96   Ht 175.3 cm (69\")   Wt 99.5 kg (219 lb 6.4 oz)   SpO2 97%   BMI 32.40 kg/m²      Physical Exam  Vitals reviewed.   Constitutional:       Appearance: Normal appearance.   Eyes:      Extraocular Movements: Extraocular movements intact.   Cardiovascular:      Rate and Rhythm: Tachycardia present.   Pulmonary:      Effort: Pulmonary effort is normal.   Skin:     General: Skin is warm.   Neurological:      General: No focal deficit present.      Mental Status: She is alert and oriented to person, place, and time.   Psychiatric:         Mood and Affect: Mood normal.         Behavior: Behavior normal.         Thought Content: Thought content normal.         Judgment: Judgment normal.     CMP:  Lab Results   Component Value Date    BUN 8 12/09/2019    CREATININE 0.69 12/09/2019    EGFRIFNONA 87 12/09/2019    BCR 11.6 12/09/2019     12/09/2019    K 3.8 12/09/2019    CO2 22.1 12/09/2019    CALCIUM 8.9 12/09/2019    ALBUMIN 3.80 12/09/2019    LABIL2 1.4 (L) 03/18/2016    BILITOT 0.4 12/09/2019    ALKPHOS 65 12/09/2019    AST 25 12/09/2019    ALT 20 12/09/2019     Lipid Panel:  Lab Results   Component Value Date    CHOL 172 12/09/2019    TRIG 380 (H) 12/09/2019    HDL 24 (L) 12/09/2019    VLDL 76 (H) " 12/09/2019    LDL 72 12/09/2019     HbA1c:  Lab Results   Component Value Date    HGBA1C 11.5 10/05/2023    HGBA1C 7.61 (H) 12/09/2019     Glucose:  Lab Results   Component Value Date    POCGLU 330 (A) 10/05/2023     Microalbumin:  Lab Results   Component Value Date    TYREE  08/29/2019      Comment:      Unable to calculate     TSH:  Lab Results   Component Value Date    TSH 1.100 12/09/2019       Assessment and Plan    Diagnoses and all orders for this visit:    1. Type 2 diabetes mellitus with hyperglycemia, without long-term current use of insulin (Primary)  Assessment & Plan:  -Diabetes is above goal with A1c 11.5.  -Discussed dietary and exercise guidelines with patient.  She would like to speak with diabetes education.  Will send referral for this.  -Discussed the importance of yearly eye exams.  -Discussed the importance of checking BG's regularly.    -Increase Ozempic 0.5 mg weekly.  Patient has no personal history of pancreatitis, no family history of MEN syndrome or medullary thyroid cancer. Possible side effects including nausea, bloating, other GI upset and rarely pancreatitis were discussed. She was advised to call the office with any symptoms or concerns.   -Start Tresiba 20 units QHS.  -Will obtain labs to determine if T1DM vs T2DM.    -S/S hypoglycemia reviewed with Rule of 15's advised.  -Follow-up in 2 weeks.     Orders:  -     POC Glycosylated Hemoglobin (Hb A1C)  -     POC Glucose, Blood  -     Ambulatory Referral to Diabetic Education  -     Glutamic Acid Decarboxylase  -     Anti-islet Cell Antibody  -     IA-2 Autoantibodies    Other orders  -     Discontinue: insulin degludec (Tresiba FlexTouch) 100 UNIT/ML solution pen-injector injection; Inject 20 Units under the skin into the appropriate area as directed Every Night.  Dispense: 15 mL; Refill: 2  -     Discontinue: Insulin Pen Needle 32G X 4 MM misc; Use 1 each Daily.  Dispense: 100 each; Refill: 2  -     Discontinue: glucose  monitor monitoring kit; Use 1 each 1 (One) Time for 1 dose.  Dispense: 1 each; Refill: 0  -     Discontinue: Lancets misc; Use 1 each 2 (Two) Times a Day.  Dispense: 150 each; Refill: 3  -     Discontinue: glucose blood test strip; 1 each by Other route 2 (Two) Times a Day.  Dispense: 150 each; Refill: 5         Return in about 2 weeks (around 10/19/2023) for Follow-up appointment. The patient was instructed to contact the clinic with any interval questions or concerns.        This document has been electronically signed by BRIAN Zelaya  October 5, 2023 15:02 EDT   Endocrinology    Please note that portions of this document were completed with a voice recognition program. Efforts were made to edit the dictations, but occasionally words are mis-transcribed.

## 2023-10-05 NOTE — PROGRESS NOTES
Specialty Pharmacy Patient Management Program  Endocrinology Initial Assessment       Donna Israel is a 63 y.o. female with Type 2 Diabetes seen by an Endocrinology provider and enrolled in the Endocrinology Patient Management program offered by [facility].  An initial outreach was conducted, including assessment of therapy appropriateness and specialty medication education for Ozempic. The patient was introduced to services offered by [facility], including: regular assessments, refill coordination, curbside pick-up or mail order delivery options, prior authorization maintenance, and financial assistance programs as applicable. The patient was also provided with contact information for the pharmacy team.     Patient is currently taking Ozempic 0.5 mg weekly.    Patient does not check her blood sugar. Reports she does not have a glucometer.    Patient denies personal/family history of thyroid cancer, denies history of pancreatitis, and denies issues with recurrent UTI/yeast infections (states she may have 1-2 UTIs a year, states they are not often and clear with treatment).   Reports she has never been diagnosed with gastroparesis. Denies ever having an episode of DKA. Denies any history of liver issues.   States she did have kidney issues, after taking ibuprofen (caused urinary retention).   States her kidney's have improved since then and she has not needed any dose adjustments based on her kidney function. Has seen a nephrologist in the past (indira Mayes).     Reports she has terrible neuropathy. Sees a podiatrist every 9 weeks (H[facility]andez Lopez in [place]).  States her next visit is in November.  Years since last eye exam.    Reports her father had Type 1 diabetes. Wants to know if she was actually misdiagnosed and has Type 1 diabetes instead of Type 2 Diabetes.     Interested in utilizing our specialty pharmacy services and the retail pharmacy.  Delivery Address:  [address]  [facility]    In the past, the patient has tried:     Drug Dose Reason for Discontinuation Notes   Januvia  Alternative Therapy    Tresiba  Alternative Therapy    Metformin   Was able to tolerate                                               Insurance Coverage & Financial Support  None noted at this time      Relevant Past Medical History and Comorbidities  Relevant medical history and concomitant health conditions were discussed with the patient. The patient's chart has been reviewed for relevant past medical history and comorbid health conditions and updated as necessary.   Past Medical History:   Diagnosis Date    Arthritis     osteo    Edema     GERD (gastroesophageal reflux disease)     Hyperlipidemia     Hypertension     Hypothyroidism     Inj musc/tend the rotator cuff of right shoulder, subs     Lumbago     Memory loss     Obesity     Osteopenia     Pain in right shoulder     Stiffness of right shoulder joint     Tobacco use disorder     Type 2 diabetes mellitus     Vitamin D deficiency      Social History     Socioeconomic History    Marital status:     Number of children: 3    Highest education level: GED or equivalent   Tobacco Use    Smoking status: Every Day     Packs/day: 0.50     Types: Cigarettes    Smokeless tobacco: Never   Substance and Sexual Activity    Alcohol use: No    Drug use: No    Sexual activity: Not Currently     Partners: Male     Birth control/protection: None       Problem list reviewed by Maggie Patel RPH on 10/5/2023 at  2:16 PM    Allergies  Known allergies and reactions were discussed with the patient. The patient's chart has been reviewed for  allergy information and updated as necessary.   Allergies   Allergen Reactions    Ibuprofen Urinary Retention       Allergies reviewed by Maggie Patel RPH on 10/5/2023 at  2:10 PM    Relevant Laboratory Values  A1C Last 3 Results          10/5/2023    13:58   HGBA1C Last 3 Results   Hemoglobin A1C  11.5      Lab Results   Component Value Date    HGBA1C 11.5 10/05/2023     Lab Results   Component Value Date    GLUCOSE 153 (H) 12/09/2019    CALCIUM 8.9 12/09/2019     12/09/2019    K 3.8 12/09/2019    CO2 22.1 12/09/2019     12/09/2019    BUN 8 12/09/2019    CREATININE 0.69 12/09/2019    EGFRIFNONA 87 12/09/2019    BCR 11.6 12/09/2019    ANIONGAP 14.9 12/09/2019     Lab Results   Component Value Date    CHOL 172 12/09/2019    CHLPL 196 03/18/2016    TRIG 380 (H) 12/09/2019    HDL 24 (L) 12/09/2019    LDL 72 12/09/2019         Current Medication List  This medication list has been reviewed with the patient and evaluated for any interactions or necessary modifications/recommendations, and updated to include all prescription medications, OTC medications, and supplements the patient is currently taking.  This list reflects what is contained in the patient's profile, which has also been marked as reviewed to communicate to other providers it is the most up to date version of the patient's current medication therapy.     Current Outpatient Medications:     levothyroxine (SYNTHROID, LEVOTHROID) 150 MCG tablet, Take 1 tablet by mouth Daily., Disp: , Rfl:     lisinopril (PRINIVIL,ZESTRIL) 2.5 MG tablet, Take 1 tablet by mouth Daily., Disp: , Rfl:     omeprazole (priLOSEC) 40 MG capsule, Take 1 capsule by mouth once daily, Disp: 90 capsule, Rfl: 1    Blood Glucose Monitoring Suppl kit, 1 kit 2 (Two) Times a Day. (Patient not taking: Reported on 10/5/2023), Disp: 1 each, Rfl: 0    glucose blood test strip, Use as directed by provider to check blood sugar twice daily., Disp: 100 each, Rfl: 5    glucose monitor monitoring kit, Use to check blood sugar as directed by provider twice daily, Disp: 1 each, Rfl: 0    insulin degludec (Tresiba FlexTouch) 100 UNIT/ML solution pen-injector injection, Inject 20 Units under the skin into the appropriate area as directed Every Night., Disp: 6 mL, Rfl: 5    Insulin Pen Needle  (Pen Needles) 32G X 4 MM misc, Use 1 each Every Night. Use 1 as directed by provider to inject insulin., Disp: 100 each, Rfl: 5    Lancets misc, Use as directed by provider to check blood sugar twice daily, Disp: 100 each, Rfl: 5    Semaglutide,0.25 or 0.5MG/DOS, (Ozempic, 0.25 or 0.5 MG/DOSE,) 2 MG/1.5ML solution pen-injector, Inject 0.5 mg under the skin into the appropriate area as directed 1 (One) Time Per Week., Disp: , Rfl:     SITagliptin (Januvia) 100 MG tablet, Take 1 tablet by mouth Daily. (Patient not taking: Reported on 10/5/2023), Disp: 30 tablet, Rfl: 5    Medicines reviewed by Maggie Patel [facility] on 10/5/2023 at  2:58 PM    Drug Interactions  No significant drug-drug interactions with diabetes medications expected according to literature.      Recommended Medications Assessment  Aspirin -  Not Taking Currently   Statin -  Not Taking Currently  ACEi/ARB - Currently Taking     Current 10-Year ASCVD Risk: Assess at next visit.    Vaccination Status:   COVID 19: Yes (2 total)  Influenza: No   Pneumococcal: No  Hep B: No     Smoker? Yes (3 days to smoke a pack, gets the 100s the long cigarettes. Smoker for 50 years.   Previously on chantix - before finishing rx - then it got pulled off the market  Tried another pill (not sure the name)  Patches did not work.     Not interested in smoking cessation currently.      Goals of Therapy  Goals related to the patient's specialty therapy were discussed with the patient. The Patient Goals segment of this outreach has been reviewed and updated.    Goals Addressed Today        HEMOGLOBIN A1C < 7      Specialty Pharmacy General Goal      Minimize Hypoglycemia              Reassessment Plan & Follow-Up  Patient's diabetes is uncontrolled with A1C of 11.5%.  Medication Therapy Changes:  None discussed with patient   Provider initiated tresiba 20 units at night.   Patient to start checking blood sugar twice daily  Related Plans, Therapy Recommendations or Therapy  Problems to Be Addressed: Based on patient's A1C, she would benefit from insulin initiation. Discussed patient's concern of actually having Type 1 Diabetes and interest getting labs to determine what type of diabetes she has.  If patient does have type 1 diabetes patient will absolutely require insulin therapy.   Will send updated scripts to the apothecary as requested by provider  Has not tried any sulfonylurea, SGLT2 inhibitor  Recommended appropriate vaccinations to patient - declines today   Patient denies issues with affordability or adherence at this time.       Attestation  I attest the patient was actively involved in and has agreed to the above plan of care. If the prescribed therapy is at any point deemed not appropriate based on the current or future assessments, a consultation will be initiated with the patient's specialty care provider to determine the best course of action. The revised plan of therapy will be documented along with any required assessments and/or additional patient education provided.    Thank you,    Maggie Patel, PharmD  Community PGY1 Pharmacy Resident  [facility]  10/05/23  15:08 EDT

## 2023-10-09 LAB — GAD65 AB SER IA-ACNC: <5 U/ML (ref 0–5)

## 2023-10-10 LAB — PANC ISLET CELL AB TITR SER: NEGATIVE {TITER}

## 2023-10-15 LAB — ISLET CELL512 AB SER-ACNC: <7.5 U/ML

## 2023-10-19 ENCOUNTER — OFFICE VISIT (OUTPATIENT)
Dept: ENDOCRINOLOGY | Facility: CLINIC | Age: 63
End: 2023-10-19
Payer: MEDICARE

## 2023-10-19 VITALS
WEIGHT: 221.2 LBS | DIASTOLIC BLOOD PRESSURE: 68 MMHG | HEART RATE: 76 BPM | SYSTOLIC BLOOD PRESSURE: 112 MMHG | BODY MASS INDEX: 32.76 KG/M2 | HEIGHT: 69 IN | OXYGEN SATURATION: 96 %

## 2023-10-19 DIAGNOSIS — E11.65 TYPE 2 DIABETES MELLITUS WITH HYPERGLYCEMIA, WITHOUT LONG-TERM CURRENT USE OF INSULIN: Primary | ICD-10-CM

## 2023-10-19 RX ORDER — BLOOD-GLUCOSE METER
EACH MISCELLANEOUS
Qty: 1 KIT | Refills: 0 | Status: SHIPPED | OUTPATIENT
Start: 2023-10-19

## 2023-10-19 RX ORDER — LANCETS
EACH MISCELLANEOUS
Qty: 100 EACH | Refills: 5 | Status: SHIPPED | OUTPATIENT
Start: 2023-10-19

## 2023-10-19 RX ORDER — PEN NEEDLE, DIABETIC 30 GX3/16"
1 NEEDLE, DISPOSABLE MISCELLANEOUS NIGHTLY
Qty: 100 EACH | Refills: 5 | Status: SHIPPED | OUTPATIENT
Start: 2023-10-19

## 2023-10-19 RX ORDER — INSULIN DEGLUDEC INJECTION 100 U/ML
20 INJECTION, SOLUTION SUBCUTANEOUS NIGHTLY
Qty: 6 ML | Refills: 5 | Status: SHIPPED | OUTPATIENT
Start: 2023-10-19

## 2023-10-19 NOTE — PROGRESS NOTES
Chief Complaint   Patient presents with    Diabetes        Referring Provider  No ref. provider found     HPI   Donna Israel is a 63 y.o. female had concerns including Diabetes.    T2DM.    Diabetes was diagnosed 1990's.  Complications include neuropathy.  Last ophtho exam was due.  Current medications for diabetes include Ozempic 0.5 mg weekly, Tresiba 20 units QHS.  Previous meds: Metformin, Januvia, Tresiba  She checks her blood sugar rarely. BG's are ranging 140-250 in the last 2 weeks.  Hypos: none    She had labs that showed negative ZARA and islet cell antibodies.    ACE/ARB:yes, Statin: no  Labs:  Lipid Panel:utd  ALVERTO: utd     The following portions of the patient's history were reviewed and updated as appropriate: allergies, current medications, past family history, past medical history, past social history, past surgical history, and problem list.    Diet: she tries to limit her carbs, she has not been limiting sugars    Past Medical History:   Diagnosis Date    Arthritis     osteo    Edema     GERD (gastroesophageal reflux disease)     Hyperlipidemia     Hypertension     Hypothyroidism     Inj musc/tend the rotator cuff of right shoulder, subs     Lumbago     Memory loss     Obesity     Osteopenia     Pain in right shoulder     Stiffness of right shoulder joint     Tobacco use disorder     Type 2 diabetes mellitus     Vitamin D deficiency      Past Surgical History:   Procedure Laterality Date    ROTATOR CUFF REPAIR Right     TUBAL ABDOMINAL LIGATION        Family History   Problem Relation Age of Onset    Arthritis Mother     COPD Mother     Hyperlipidemia Mother     Hypertension Mother     Thyroid disease Mother     Alcohol abuse Father     Arthritis Father     Cancer Father     Diabetes Father     Hyperlipidemia Father     Arthritis Sister     COPD Sister     Diabetes Sister     Heart disease Sister     Hyperlipidemia Sister     Hypertension Sister     Thyroid disease Sister     Arthritis Sister      Diabetes Sister     Hyperlipidemia Sister     Thyroid disease Sister     Thyroid disease Sister     Diabetes Brother     Hyperlipidemia Brother     Hypertension Brother     Heart disease Maternal Grandmother     Breast cancer Maternal Aunt     Breast cancer Maternal Aunt       Social History     Socioeconomic History    Marital status:     Number of children: 3    Highest education level: GED or equivalent   Tobacco Use    Smoking status: Every Day     Packs/day: .5     Types: Cigarettes    Smokeless tobacco: Never   Substance and Sexual Activity    Alcohol use: No    Drug use: No    Sexual activity: Not Currently     Partners: Male     Birth control/protection: None      Allergies   Allergen Reactions    Ibuprofen Urinary Retention      Current Outpatient Medications on File Prior to Visit   Medication Sig Dispense Refill    levothyroxine (SYNTHROID, LEVOTHROID) 150 MCG tablet Take 1 tablet by mouth Daily.      lisinopril (PRINIVIL,ZESTRIL) 2.5 MG tablet Take 1 tablet by mouth Daily.      omeprazole (priLOSEC) 40 MG capsule Take 1 capsule by mouth once daily 90 capsule 1    Semaglutide,0.25 or 0.5MG/DOS, (Ozempic, 0.25 or 0.5 MG/DOSE,) 2 MG/1.5ML solution pen-injector Inject 0.5 mg under the skin into the appropriate area as directed 1 (One) Time Per Week.      [DISCONTINUED] Accu-Chek Softclix Lancets lancets Use as instructed 100 each 5    [DISCONTINUED] Blood Glucose Monitoring Suppl (Accu-Chek Guide) w/Device kit Use as directed per Prescriber 1 kit 0    [DISCONTINUED] Blood Glucose Monitoring Suppl kit 1 kit 2 (Two) Times a Day. (Patient not taking: Reported on 10/5/2023) 1 each 0    [DISCONTINUED] glucose blood test strip Use as directed by provider to check blood sugar twice daily. 100 each 5    [DISCONTINUED] insulin degludec (Tresiba FlexTouch) 100 UNIT/ML solution pen-injector injection Inject 20 Units under the skin into the appropriate area as directed Every Night. 6 mL 5    [DISCONTINUED]  "Insulin Pen Needle (Pen Needles) 32G X 4 MM misc Use 1 pen needle Every Night as directed by provider to inject insulin. 100 each 5    [DISCONTINUED] SITagliptin (Januvia) 100 MG tablet Take 1 tablet by mouth Daily. (Patient not taking: Reported on 10/5/2023) 30 tablet 5     No current facility-administered medications on file prior to visit.        Review of Systems   Constitutional:  Positive for diaphoresis, fatigue and unexpected weight loss. Negative for unexpected weight gain.   Eyes:  Positive for blurred vision.   Endocrine: Positive for polydipsia and polyuria. Negative for polyphagia.   Psychiatric/Behavioral:  Positive for sleep disturbance.    All other systems reviewed and are negative.       /68 (BP Location: Left arm, Patient Position: Sitting, Cuff Size: Adult)   Pulse 76   Ht 175.3 cm (69\")   Wt 100 kg (221 lb 3.2 oz)   SpO2 96%   BMI 32.67 kg/m²      Physical Exam  Vitals reviewed.   Constitutional:       Appearance: Normal appearance.   Eyes:      Extraocular Movements: Extraocular movements intact.   Cardiovascular:      Rate and Rhythm: Tachycardia present.      Pulses:           Dorsalis pedis pulses are 1+ on the right side and 1+ on the left side.        Posterior tibial pulses are 1+ on the right side and 1+ on the left side.   Pulmonary:      Effort: Pulmonary effort is normal.   Feet:      Right foot:      Protective Sensation: 10 sites tested.  2 sites sensed.      Skin integrity: Callus and dry skin present.      Toenail Condition: Right toenails are abnormally thick and long.      Left foot:      Protective Sensation: 10 sites tested.  2 sites sensed.      Skin integrity: Callus and dry skin present.      Toenail Condition: Left toenails are abnormally thick and long.      Comments: Diabetic Foot Exam Performed and Monofilament Test Performed  Skin:     General: Skin is warm.   Neurological:      General: No focal deficit present.      Mental Status: She is alert and oriented " to person, place, and time.   Psychiatric:         Mood and Affect: Mood normal.         Behavior: Behavior normal.         Thought Content: Thought content normal.         Judgment: Judgment normal.       CMP:  Lab Results   Component Value Date    BUN 8 12/09/2019    CREATININE 0.69 12/09/2019    EGFRIFNONA 87 12/09/2019    BCR 11.6 12/09/2019     12/09/2019    K 3.8 12/09/2019    CO2 22.1 12/09/2019    CALCIUM 8.9 12/09/2019    ALBUMIN 3.80 12/09/2019    LABIL2 1.4 (L) 03/18/2016    BILITOT 0.4 12/09/2019    ALKPHOS 65 12/09/2019    AST 25 12/09/2019    ALT 20 12/09/2019     Lipid Panel:  Lab Results   Component Value Date    CHOL 172 12/09/2019    TRIG 380 (H) 12/09/2019    HDL 24 (L) 12/09/2019    VLDL 76 (H) 12/09/2019    LDL 72 12/09/2019     HbA1c:  Lab Results   Component Value Date    HGBA1C 11.5 10/05/2023    HGBA1C 7.61 (H) 12/09/2019     Glucose:  Lab Results   Component Value Date    POCGLU 330 (A) 10/05/2023     Microalbumin:  Lab Results   Component Value Date    MALBCRERATIO  08/29/2019      Comment:      Unable to calculate     TSH:  Lab Results   Component Value Date    TSH 1.100 12/09/2019       Assessment and Plan    Diagnoses and all orders for this visit:    1. Type 2 diabetes mellitus with hyperglycemia, without long-term current use of insulin (Primary)  Assessment & Plan:  -Diabetes is improving with improving BG's over the last 2 weeks.  -Discussed dietary and exercise guidelines with patient.    -Discussed the importance of yearly eye exams.  -Discussed the importance of checking BG's regularly.    -Continue Ozempic 0.5 mg weekly.  Patient has no personal history of pancreatitis, no family history of MEN syndrome or medullary thyroid cancer. Possible side effects including nausea, bloating, other GI upset and rarely pancreatitis were discussed. She was advised to call the office with any symptoms or concerns.   -Decrease Tresiba 10 units QHS.  -Start Jardiance 10 mg QD. Discussed  the medication's MOA and that it may cause more frequent urination particularly upon starting the medication. Take one tablet in the morning with or without food. Encouraged to drink plenty of water as to not get dehydrated especially in warmer weather or with activity. Also discussed there may be an increased incidence of UTIs or yeast infections and to monitor for signs/symptoms.  -S/S hypoglycemia reviewed with Rule of 15's advised.  -Follow-up in 3 months.     Orders:  -     empagliflozin (Jardiance) 10 MG tablet tablet; Take 1 tablet by mouth Daily.  Dispense: 30 tablet; Refill: 3  -     insulin degludec (Tresiba FlexTouch) 100 UNIT/ML solution pen-injector injection; Inject 20 Units under the skin into the appropriate area as directed Every Night.  Dispense: 6 mL; Refill: 5  -     glucose blood test strip; Use as directed by provider to check blood sugar twice daily.  Dispense: 100 each; Refill: 5  -     Blood Glucose Monitoring Suppl (Accu-Chek Guide) w/Device kit; Use as directed per Prescriber  Dispense: 1 kit; Refill: 0  -     Blood Glucose Monitoring Suppl kit; Use 1 kit 1 (One) Time for 1 dose.  Dispense: 1 each; Refill: 0  -     Accu-Chek Softclix Lancets lancets; Use as instructed  Dispense: 100 each; Refill: 5  -     Insulin Pen Needle (Pen Needles) 32G X 4 MM misc; Use 1 pen needle Every Night as directed by provider to inject insulin.  Dispense: 100 each; Refill: 5  -     Microalbumin / Creatinine Urine Ratio - Urine, Clean Catch           Return in about 3 months (around 1/19/2024) for Follow-up appointment, A1C. The patient was instructed to contact the clinic with any interval questions or concerns.        This document has been electronically signed by BRIAN Zelaya  October 19, 2023 16:07 EDT   Endocrinology    Please note that portions of this document were completed with a voice recognition program. Efforts were made to edit the dictations, but occasionally words are mis-transcribed.

## 2023-10-19 NOTE — ASSESSMENT & PLAN NOTE
-Diabetes is improving with improving BG's over the last 2 weeks.  -Discussed dietary and exercise guidelines with patient.    -Discussed the importance of yearly eye exams.  -Discussed the importance of checking BG's regularly.    -Continue Ozempic 0.5 mg weekly.  Patient has no personal history of pancreatitis, no family history of MEN syndrome or medullary thyroid cancer. Possible side effects including nausea, bloating, other GI upset and rarely pancreatitis were discussed. She was advised to call the office with any symptoms or concerns.   -Decrease Tresiba 10 units QHS.  -Start Jardiance 10 mg QD. Discussed the medication's MOA and that it may cause more frequent urination particularly upon starting the medication. Take one tablet in the morning with or without food. Encouraged to drink plenty of water as to not get dehydrated especially in warmer weather or with activity. Also discussed there may be an increased incidence of UTIs or yeast infections and to monitor for signs/symptoms.  -S/S hypoglycemia reviewed with Rule of 15's advised.  -Follow-up in 3 months.

## 2023-10-30 ENCOUNTER — TELEPHONE (OUTPATIENT)
Dept: DIABETES SERVICES | Facility: HOSPITAL | Age: 63
End: 2023-10-30
Payer: MEDICARE

## 2023-11-13 ENCOUNTER — EDUCATION (OUTPATIENT)
Dept: DIABETES SERVICES | Facility: HOSPITAL | Age: 63
End: 2023-11-13
Payer: MEDICARE

## 2023-11-13 NOTE — CONSULTS
Diabetes Education  Assessment/Teaching    Patient Name:  Donna Israel  YOB: 1960  MRN: [id number]  Admit Date:  (Not on file)      Assessment Date:  11/13/2023      Flowsheet Row Most Recent Value   DM Education Needs    Meter Has own   Frequency of Testing 2 times a day   Medication Insulin, Oral   Problem Solving Hypoglycemia, Hyperglycemia, Sick days, Signs, Symptoms, Treatment   Reducing Risks Retinopathy, Neuropathy, Cardiovascular, Immunizations, Foot care, Dental exam, Eye exam, Blood pressure, A1C testing, Lipids   Healthy Eating Basic meal plan provided   Physical Activity Walking   Physical Activity Frequency Occasionally   Healthy Coping Appropriate   Discharge Plan Follow-up with PCP, Follow-up with endocrinolgoist   Motivation Moderate   Teaching Method Explanation, Discussion, Handouts   Patient Response Verbalized understanding              Other Comments:  A1C 11.5 Patient is a 63 year old female with a history of arthritis, HLD, HTN, diabetes and GERD.   Healthy eating:  Patient states she has decrease carb intake and tries to eat healthy along with portion control. .   Being active:   Patient states she will walk at least 30 minutes two times a week and increase the times in two weeks if she can tolerate.   Monitoring:   Patient states that she is taking her blood glucose 2 times a day and knows when it is low or high..   Medication:  Patient will take medications as prescribed. Patient was able to list her medications and the dose as prescribed.  Ozempic 0.5 mg weekly,   Tresiba 10 units QHS   empagliflozin (Jardiance) 10 MG tablet table   Problem solving:  Patient states the signs and symptoms of hypo/hyperglycemia and the treatment for both.   Reducing risk:  Patient states she checks her feet nightly and does not walk without something to protect her feet. Patient states she goes to her follow up appointments and goes to the eye doctor and dentist when needed and on annual  visits. Patient also states she sees a podiatrist.   Healthy coping:  Patient has a good support team at home and is able to talk to them for help when needed.   Patient received a call and was educated on diet, activity, checking blood glucose, taking medication as prescribed, checking feet daily and S/S of hypo/hyperglycemia. Patient was educated on sick rule days. A packet of information will be mailed, that includes ADA handouts, AADE 7 handout, fast food book and grocery shopping book for help when shopping along with office contact number for questions. Patient had no questions or concerns. Thank you.        Electronically signed by:  Lindsay Wilks RN  11/13/23 15:13 EST

## 2024-01-11 NOTE — ASSESSMENT & PLAN NOTE
Assessment/Plan:    Encounter for surgical aftercare following surgery of digestive system  -s/p open Nakita-En-Y Gastric Bypass - DS (multiple surgeries) with Dr. Cruz in [place] in 2000.  Struggling with weight regain. Obtain repeat UGI to r/o GGF. Work on diet and lifestyle changes, keep logs/track, consult with RD and KERMIT.    Initial: 315lbs  Current: 241.1lbs  Warren: 170lbs  Current BMI is Body mass index is 37.76 kg/m².    Tolerating a regular diet-yes  Eating at least 60 grams of protein per day-no  Following 30/60 minute rule with liquids-no  Drinking at least 64 ounces of fluid per day-no  Drinking carbonated beverages-no  Sufficient exercise-no d/t back and knee pain  Using NSAIDs regularly-no  Using nicotine-no  Using alcohol-no. Advised about the risks of alcohol s/p bariatric surgery and recommend avoiding all alcohol      Postsurgical malabsorption  -At risk for malabsorption of vitamins/minerals secondary to malabsorption and restriction of intake from bariatric surgery  -NOT Currently taking adequate postop bariatric surgery vitamin supplementation: 1,000mcg B12, Vitamin C, Vitamin D 5,000IU, folic acid, potassium - needs to start bariatric MVI and calcium citrate 500mg TID    -Obtain CBC/Metabolic panel  -Patient received education about the importance of adhering to a lifelong supplementation regimen to avoid vitamin/mineral deficiencies       GERD (gastroesophageal reflux disease)  -On Nesium 40mg BID and Pepcid 40mg   -Advised avoidance of food triggers and gastric irritants, follow anti-reflux diet and lifestyle measures  -Obtain repeat UGI  -Weight loss and diet changes should help with reflux symptoms        Diagnoses and all orders for this visit:    Encounter for surgical aftercare following surgery of digestive system    Postsurgical malabsorption  -     CBC and differential; Future  -     Comprehensive metabolic panel; Future  -     Folate; Future  -     Hemoglobin A1C; Future  -     Iron Panel  Renal condition is improving with lifestyle modifications.  Continue current treatment regimen.  Renal condition will be reassessed next Scheduled visit.   "(Includes Ferritin, Iron Sat%, Iron, and TIBC); Future  -     Zinc; Future  -     Vitamin D 25 hydroxy; Future  -     Vitamin B12; Future  -     Vitamin B1, whole blood; Future  -     Vitamin A; Future  -     PTH, intact; Future  -     Lipid panel; Future  -     Vitamin E; Future  -     Vitamin K; Future    GERD (gastroesophageal reflux disease)    Obesity, Class II, BMI 35-39.9  -     Hemoglobin A1C; Future  -     Lipid panel; Future    Weight gain following gastric bypass surgery  -     FL UPPER GI UGI; Future    Other orders  -     amoxicillin-clavulanate (AUGMENTIN) 875-125 mg per tablet; TAKE 1 TABLET BY MOUTH EVERY 12 HOURS FOR 10 DAYS          Subjective:      Patient ID: Paulina Sidhu is a 69 y.o. female.    -s/p open Single-stage DS with Dr. Cruz in [place] in 2000. She [id number] s/p surgery but then developed intractable diarrhea. She underwent an unknown laparoscopic revision x 2 with her same surgeon in 2001 and 2003, which she believes was a RYGB as well as multiple HH repairs. She maintained her weight for years until 2012 when she retired and she has since regained 75lbs. She has increased heartburn despite taking high dose PPI and H2 blockers since regaining weight and she has IBS-C. She wishes to get back on track and lose weight.    She saw Dr. Salguero in 2020. He did UGI - \"small-moderate HH and moderate GERD.\" She has had EGD with Dr. Salguero in December 2020 - he notes \"Gastric bypass anatomy with 50 cc gastric pouch.\" He referred her to [facility].    Follows FODMAP roughly - has helped with gas/bloating. Followed by Dr. Beltran.     Feels constant hunger. Rare social ETOH, no smoking - quit 1984, intermittent NSAIDS - tries to avoid.     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 Daughter Dodie present - wishes to pursue alternative medicine.     Diet Recall:   B - 1 cup coffee w/ egg and toasted bagel or english muffin  L - skips or pizza or biscuit  D - chicken or pasta or fish or venison or barley and " "mushroom soup of stuffed pepper  HS - chips or pretzels    Fluids - 16oz coffee w/ creamer, 32oz water, sugary iced tea some days, cranberry juice 6oz    The following portions of the patient's history were reviewed and updated as appropriate: allergies, current medications, past family history, past medical history, past social history, past surgical history and problem list.    Review of Systems   Constitutional:  Positive for unexpected weight change (weight regain). Negative for chills and fever.   HENT:  Negative for trouble swallowing.    Respiratory:  Negative for cough and shortness of breath.    Cardiovascular:  Negative for chest pain and palpitations.   Gastrointestinal:  Negative for abdominal pain, constipation, diarrhea, nausea and vomiting.   Musculoskeletal:  Positive for arthralgias and back pain.   Neurological:  Negative for dizziness.   Psychiatric/Behavioral:          Denies anxiety and depression         Objective:      /84 (BP Location: Left arm, Patient Position: Sitting, Cuff Size: Adult)   Pulse 80   Resp 18   Ht 5' 7\" (1.702 m)   Wt 109 kg (241 lb 1.6 oz)   SpO2 98%   BMI 37.76 kg/m²     Colonoscopy-Completed       Physical Exam  Vitals reviewed.   Constitutional:       General: She is not in acute distress.     Appearance: She is well-developed.   HENT:      Head: Normocephalic and atraumatic.   Eyes:      General: No scleral icterus.  Cardiovascular:      Rate and Rhythm: Normal rate and regular rhythm.   Pulmonary:      Effort: Pulmonary effort is normal. No respiratory distress.   Abdominal:      General: There is no distension.      Palpations: Abdomen is soft.   Skin:     General: Skin is warm and dry.   Neurological:      Mental Status: She is alert.   Psychiatric:         Mood and Affect: Mood normal.         Behavior: Behavior normal.           BARRIERS: none identified    GOALS:   Continued/Maintain healthy weight loss with good nutrition intakes.  Adequate hydration " with at least 64oz. fluid intake.  Normal vitamin and mineral levels.  Exercise as tolerated.  Follow 30/60 minute rule  Avoid juice or any sugary drinks, no NSAIDS    Follow-up in 1 year. We kindly ask that your arrive 15 minutes before your scheduled appointment time with your provider to allow our staff to room you, get your vital signs and update your chart.    Follow diet as discussed.      Get lab work done in the next 2 weeks.  You have been given a lab slip today.  Please call the office if you need a replacement.  It is recommended to check with your insurance BEFORE getting labs done to make sure they are covered by your policy.  Also, please check with your PCP and other providers before getting labs to avoid duplicate labs. Make sure to HOLD any multivitamins that may contain biotin and any biotin supplements FOR 5 DAYS before any labs since it can affect the results.    Follow vitamin and mineral recommendations as reviewed with you.    Call our office if you have any problems with abdominal pain especially associated with fever, chills, nausea, vomiting or any other concerns.    All  Post-bariatric surgery patients should be aware that very small quantities of any alcohol can cause impairment and it is very possible not to feel the effect. The effect can be in the system for several hours.  It is also a stomach irritant.     It is advised to AVOID alcohol, Nonsteroidal antiinflammatory drugs (NSAIDS) and nicotine of all forms . Any of these can cause stomach irritation/pain.

## 2024-01-25 ENCOUNTER — SPECIALTY PHARMACY (OUTPATIENT)
Dept: PHARMACY | Facility: HOSPITAL | Age: 64
End: 2024-01-25
Payer: MEDICARE

## 2024-01-25 ENCOUNTER — OFFICE VISIT (OUTPATIENT)
Dept: ENDOCRINOLOGY | Facility: CLINIC | Age: 64
End: 2024-01-25
Payer: MEDICARE

## 2024-01-25 VITALS
WEIGHT: 209.4 LBS | SYSTOLIC BLOOD PRESSURE: 98 MMHG | DIASTOLIC BLOOD PRESSURE: 70 MMHG | OXYGEN SATURATION: 97 % | HEIGHT: 69 IN | HEART RATE: 90 BPM | BODY MASS INDEX: 31.01 KG/M2

## 2024-01-25 DIAGNOSIS — E11.65 TYPE 2 DIABETES MELLITUS WITH HYPERGLYCEMIA, WITHOUT LONG-TERM CURRENT USE OF INSULIN: Primary | ICD-10-CM

## 2024-01-25 LAB
EXPIRATION DATE: ABNORMAL
GLUCOSE BLDC GLUCOMTR-MCNC: 125 MG/DL (ref 70–130)
HBA1C MFR BLD: 7.2 % (ref 4.5–5.7)
Lab: ABNORMAL

## 2024-01-25 NOTE — PROGRESS NOTES
Specialty Pharmacy Patient Management Program  Endocrinology Initial Assessment       Donna Israel is a 63 y.o. female with Type 2 Diabetes seen by an Endocrinology provider and enrolled in the Endocrinology Patient Management program offered by [facility].  An initial outreach was conducted, including assessment of therapy appropriateness and specialty medication education for Ozempic. The patient was introduced to services offered by [facility], including: regular assessments, refill coordination, curbside pick-up or mail order delivery options, prior authorization maintenance, and financial assistance programs as applicable. The patient was also provided with contact information for the pharmacy team.     Patient is currently taking Ozempic 0.5 mg weekly and Tresiba 20 units nightly. Patient checks BG ~3 times per week, reporting BG in the low 100's. Patient denies any low BG < 70.    Patient denies personal/family history of thyroid cancer, denies history of pancreatitis, and denies issues with recurrent UTI/yeast infections (states she may have 1-2 UTIs a year, states they are not often and clear with treatment).     States she did have kidney issues, after taking ibuprofen (caused urinary retention). States her kidney's have improved since then and she has not needed any dose adjustments based on her kidney function. Has seen a nephrologist in the past (maybe [facility]).       In the past, the patient has tried:     Drug Dose Reason for Discontinuation Notes   Januvia  Alternative Therapy    Tresiba  Alternative Therapy    Metformin   Was able to tolerate                                               Insurance Coverage & Financial Support  None noted at this time      Relevant Past Medical History and Comorbidities  Relevant medical history and concomitant health conditions were discussed with the patient. The patient's chart has been reviewed for relevant past  medical history and comorbid health conditions and updated as necessary.   Past Medical History:   Diagnosis Date    Arthritis     osteo    Edema     GERD (gastroesophageal reflux disease)     Hyperlipidemia     Hypertension     Hypothyroidism     Inj musc/tend the rotator cuff of right shoulder, subs     Lumbago     Memory loss     Obesity     Osteopenia     Pain in right shoulder     Stiffness of right shoulder joint     Tobacco use disorder     Type 2 diabetes mellitus     Vitamin D deficiency      Social History     Socioeconomic History    Marital status:     Number of children: 3    Highest education level: GED or equivalent   Tobacco Use    Smoking status: Every Day     Packs/day: .5     Types: Cigarettes    Smokeless tobacco: Never   Substance and Sexual Activity    Alcohol use: No    Drug use: No    Sexual activity: Not Currently     Partners: Male     Birth control/protection: None       Problem list reviewed by Jami Coelho RPH on 1/25/2024 at  1:45 PM    Allergies  Known allergies and reactions were discussed with the patient. The patient's chart has been reviewed for  allergy information and updated as necessary.   Allergies   Allergen Reactions    Ibuprofen Urinary Retention       Allergies reviewed by Jami Coelho RPH on 1/25/2024 at  1:43 PM  Allergies reviewed by Jami Coelho RPH on 1/25/2024 at  1:43 PM    Relevant Laboratory Values  A1C Last 3 Results          10/5/2023    13:58 1/25/2024    13:38   HGBA1C Last 3 Results   Hemoglobin A1C 11.5  7.2      Lab Results   Component Value Date    HGBA1C 7.2 (A) 01/25/2024     Lab Results   Component Value Date    GLUCOSE 153 (H) 12/09/2019    CALCIUM 8.9 12/09/2019     12/09/2019    K 3.8 12/09/2019    CO2 22.1 12/09/2019     12/09/2019    BUN 8 12/09/2019    CREATININE 0.69 12/09/2019    EGFRIFNONA 87 12/09/2019    BCR 11.6 12/09/2019    ANIONGAP 14.9 12/09/2019     Lab Results   Component Value Date    CHOL 172  12/09/2019    CHLPL 196 03/18/2016    TRIG 380 (H) 12/09/2019    HDL 24 (L) 12/09/2019    LDL 72 12/09/2019         Current Medication List  This medication list has been reviewed with the patient and evaluated for any interactions or necessary modifications/recommendations, and updated to include all prescription medications, OTC medications, and supplements the patient is currently taking.  This list reflects what is contained in the patient's profile, which has also been marked as reviewed to communicate to other providers it is the most up to date version of the patient's current medication therapy.     Current Outpatient Medications:     Accu-Chek Softclix Lancets lancets, Use as instructed, Disp: 100 each, Rfl: 5    Blood Glucose Monitoring Suppl (Accu-Chek Guide) w/Device kit, Use as directed per Prescriber, Disp: 1 kit, Rfl: 0    empagliflozin (Jardiance) 10 MG tablet tablet, Take 1 tablet by mouth Daily., Disp: 30 tablet, Rfl: 3    glucose blood test strip, Use as directed by provider to check blood sugar twice daily., Disp: 100 each, Rfl: 5    insulin degludec (Tresiba FlexTouch) 100 UNIT/ML solution pen-injector injection, Inject 20 Units under the skin into the appropriate area as directed Every Night., Disp: 6 mL, Rfl: 5    Insulin Pen Needle (Pen Needles) 32G X 4 MM misc, Use 1 pen needle Every Night as directed by provider to inject insulin., Disp: 100 each, Rfl: 5    levothyroxine (SYNTHROID, LEVOTHROID) 150 MCG tablet, Take 1 tablet by mouth Daily., Disp: , Rfl:     lisinopril (PRINIVIL,ZESTRIL) 2.5 MG tablet, Take 1 tablet by mouth Daily., Disp: , Rfl:     omeprazole (priLOSEC) 40 MG capsule, Take 1 capsule by mouth once daily, Disp: 90 capsule, Rfl: 1    Semaglutide,0.25 or 0.5MG/DOS, (Ozempic, 0.25 or 0.5 MG/DOSE,) 2 MG/1.5ML solution pen-injector, Inject 0.5 mg under the skin into the appropriate area as directed 1 (One) Time Per Week., Disp: , Rfl:     Medicines reviewed by Jami Coelho,  RANDA on 1/25/2024 at  1:45 PM    Drug Interactions  No significant drug-drug interactions with diabetes medications expected according to literature.      Recommended Medications Assessment  Aspirin -  Not Taking Currently   Statin -  Not Taking Currently  ACEi/ARB - Currently Taking     Current 10-Year ASCVD Risk: Assess at next visit.    Vaccination Status:   COVID 19: Yes (2 total)  Influenza: No   Pneumococcal: No  Hep B: No     Smoker? Yes (3 days to smoke a pack, gets the 100s the long cigarettes. Smoker for 50 years.   Previously on chantix - before finishing rx - then it got pulled off the market  Tried another pill (not sure the name)  Patches did not work.     Not interested in smoking cessation currently.      Goals of Therapy  Goals related to the patient's specialty therapy were discussed with the patient. The Patient Goals segment of this outreach has been reviewed and updated.    Goals Addressed Today    None         Reassessment Plan & Follow-Up  Patient's diabetes has significantly improved with A1C down to 7.2% from 11.5%.  Provider Medication Therapy Changes, per BRIAN Zelaya:    Start Jardiance 25 mg daily  Related Plans, Therapy Recommendations or Therapy Problems to Be Addressed:  Patient may benefit from CGM in the future to more closely monitor BG.  Patient denies issues with affordability or adherence at this time.       Attestation  I attest the patient was actively involved in and has agreed to the above plan of care. If the prescribed therapy is at any point deemed not appropriate based on the current or future assessments, a consultation will be initiated with the patient's specialty care provider to determine the best course of action. The revised plan of therapy will be documented along with any required assessments and/or additional patient education provided.    Thank you,    Jami Coelho RP  PGY-1 Community Pharmacy Resident  01/25/24  13:49 EST

## 2024-01-25 NOTE — PROGRESS NOTES
Chief Complaint   Patient presents with    Diabetes        Referring Provider  No ref. provider found     HPI   Donna Israel is a 63 y.o. female had concerns including Diabetes.    T2DM.    She is in the process of getting dentures. She has been taking her medication regularly without missing doses.    Diabetes:  Diabetes was diagnosed 1990's.  Complications include neuropathy.  Last ophtho exam was due.  Current medications for diabetes include Ozempic 0.5 mg weekly, Tresiba 20 units QHS, Jardiance 10 mg QD.  Previous meds: Metformin, Januvia, Tresiba  She checks her blood sugar rarely. BG's are ranging 140-250 in the last 2 weeks.  Hypos: none    She had labs that showed negative ZARA and islet cell antibodies.    ACE/ARB:yes, Statin: no  Labs:  Lipid Panel:utd  ALVERTO: utd     The following portions of the patient's history were reviewed and updated as appropriate: allergies, current medications, past family history, past medical history, past social history, past surgical history, and problem list.    Diet: she tries to limit her carbs, she has not been limiting sugars    Past Medical History:   Diagnosis Date    Arthritis     osteo    Edema     GERD (gastroesophageal reflux disease)     Hyperlipidemia     Hypertension     Hypothyroidism     Inj musc/tend the rotator cuff of right shoulder, subs     Lumbago     Memory loss     Obesity     Osteopenia     Pain in right shoulder     Stiffness of right shoulder joint     Tobacco use disorder     Type 2 diabetes mellitus     Vitamin D deficiency      Past Surgical History:   Procedure Laterality Date    ROTATOR CUFF REPAIR Right     TUBAL ABDOMINAL LIGATION        Family History   Problem Relation Age of Onset    Arthritis Mother     COPD Mother     Hyperlipidemia Mother     Hypertension Mother     Thyroid disease Mother     Alcohol abuse Father     Arthritis Father     Cancer Father     Diabetes Father     Hyperlipidemia Father     Arthritis Sister     COPD Sister      Diabetes Sister     Heart disease Sister     Hyperlipidemia Sister     Hypertension Sister     Thyroid disease Sister     Arthritis Sister     Diabetes Sister     Hyperlipidemia Sister     Thyroid disease Sister     Thyroid disease Sister     Diabetes Brother     Hyperlipidemia Brother     Hypertension Brother     Heart disease Maternal Grandmother     Breast cancer Maternal Aunt     Breast cancer Maternal Aunt       Social History     Socioeconomic History    Marital status:     Number of children: 3    Highest education level: GED or equivalent   Tobacco Use    Smoking status: Every Day     Packs/day: .5     Types: Cigarettes    Smokeless tobacco: Never   Substance and Sexual Activity    Alcohol use: No    Drug use: No    Sexual activity: Not Currently     Partners: Male     Birth control/protection: None      Allergies   Allergen Reactions    Ibuprofen Urinary Retention      Current Outpatient Medications on File Prior to Visit   Medication Sig Dispense Refill    Accu-Chek Softclix Lancets lancets Use as instructed 100 each 5    Blood Glucose Monitoring Suppl (Accu-Chek Guide) w/Device kit Use as directed per Prescriber 1 kit 0    glucose blood test strip Use as directed by provider to check blood sugar twice daily. 100 each 5    insulin degludec (Tresiba FlexTouch) 100 UNIT/ML solution pen-injector injection Inject 20 Units under the skin into the appropriate area as directed Every Night. 6 mL 5    Insulin Pen Needle (Pen Needles) 32G X 4 MM misc Use 1 pen needle Every Night as directed by provider to inject insulin. 100 each 5    levothyroxine (SYNTHROID, LEVOTHROID) 150 MCG tablet Take 1 tablet by mouth Daily.      lisinopril (PRINIVIL,ZESTRIL) 2.5 MG tablet Take 1 tablet by mouth Daily.      omeprazole (priLOSEC) 40 MG capsule Take 1 capsule by mouth once daily 90 capsule 1    Semaglutide,0.25 or 0.5MG/DOS, (Ozempic, 0.25 or 0.5 MG/DOSE,) 2 MG/1.5ML solution pen-injector Inject 0.5 mg under the  "skin into the appropriate area as directed 1 (One) Time Per Week.       No current facility-administered medications on file prior to visit.        Review of Systems   Constitutional:  Positive for diaphoresis, fatigue and unexpected weight loss. Negative for unexpected weight gain.   Eyes:  Positive for blurred vision.   Endocrine: Positive for polydipsia and polyuria. Negative for polyphagia.   Psychiatric/Behavioral:  Positive for sleep disturbance.    All other systems reviewed and are negative.     BP 98/70 (BP Location: Left arm, Patient Position: Sitting, Cuff Size: Adult)   Pulse 90   Ht 175.3 cm (69\")   Wt 95 kg (209 lb 6.4 oz)   SpO2 97%   BMI 30.92 kg/m²      Physical Exam  Vitals reviewed.   Constitutional:       Appearance: Normal appearance.   Eyes:      Extraocular Movements: Extraocular movements intact.   Cardiovascular:      Rate and Rhythm: Tachycardia present.   Pulmonary:      Effort: Pulmonary effort is normal.   Skin:     General: Skin is warm.   Neurological:      General: No focal deficit present.      Mental Status: She is alert and oriented to person, place, and time.   Psychiatric:         Mood and Affect: Mood normal.         Behavior: Behavior normal.         Thought Content: Thought content normal.         Judgment: Judgment normal.       CMP:  Lab Results   Component Value Date    BUN 8 12/09/2019    CREATININE 0.69 12/09/2019    EGFRIFNONA 87 12/09/2019    BCR 11.6 12/09/2019     12/09/2019    K 3.8 12/09/2019    CO2 22.1 12/09/2019    CALCIUM 8.9 12/09/2019    ALBUMIN 3.80 12/09/2019    LABIL2 1.4 (L) 03/18/2016    BILITOT 0.4 12/09/2019    ALKPHOS 65 12/09/2019    AST 25 12/09/2019    ALT 20 12/09/2019     Lipid Panel:  Lab Results   Component Value Date    CHOL 172 12/09/2019    TRIG 380 (H) 12/09/2019    HDL 24 (L) 12/09/2019    VLDL 76 (H) 12/09/2019    LDL 72 12/09/2019     HbA1c:  Lab Results   Component Value Date    HGBA1C 7.2 (A) 01/25/2024    HGBA1C 11.5 " 10/05/2023     Glucose:  Lab Results   Component Value Date    POCGLU 125 01/25/2024     Microalbumin:  Lab Results   Component Value Date    ANDREYO  08/29/2019      Comment:      Unable to calculate     TSH:  Lab Results   Component Value Date    TSH 1.100 12/09/2019       Assessment and Plan    Diagnoses and all orders for this visit:    1. Type 2 diabetes mellitus with hyperglycemia, without long-term current use of insulin (Primary)  Assessment & Plan:  -Diabetes is improving with A1c down from 11.5 to 7.2.  -Discussed dietary and exercise guidelines with patient.    -Discussed the importance of yearly eye exams.  -Discussed the importance of checking BG's regularly.    -Continue Ozempic 0.5 mg weekly.  Patient has no personal history of pancreatitis, no family history of MEN syndrome or medullary thyroid cancer. Possible side effects including nausea, bloating, other GI upset and rarely pancreatitis were discussed. She was advised to call the office with any symptoms or concerns.   -Continue Tresiba 20 units QHS.  -Continue Jardiance 10 mg QD. Discussed the medication's MOA and that it may cause more frequent urination particularly upon starting the medication. Take one tablet in the morning with or without food. Encouraged to drink plenty of water as to not get dehydrated especially in warmer weather or with activity. Also discussed there may be an increased incidence of UTIs or yeast infections and to monitor for signs/symptoms.  -S/S hypoglycemia reviewed with Rule of 15's advised.  -Follow-up in 3 months.     Orders:  -     POC Glucose, Blood  -     POC Glycosylated Hemoglobin (Hb A1C)    Other orders  -     empagliflozin (Jardiance) 25 MG tablet tablet; Take 1 tablet by mouth Daily.  Dispense: 30 tablet; Refill: 6           Return in about 3 months (around 4/25/2024) for Follow-up appointment, A1C. The patient was instructed to contact the clinic with any interval questions or concerns.        This  document has been electronically signed by BRIAN Zelaya  February 4, 2024 19:41 EST   Endocrinology    Please note that portions of this document were completed with a voice recognition program. Efforts were made to edit the dictations, but occasionally words are mis-transcribed.

## 2024-02-05 NOTE — ASSESSMENT & PLAN NOTE
-Diabetes is improving with A1c down from 11.5 to 7.2.  -Discussed dietary and exercise guidelines with patient.    -Discussed the importance of yearly eye exams.  -Discussed the importance of checking BG's regularly.    -Continue Ozempic 0.5 mg weekly.  Patient has no personal history of pancreatitis, no family history of MEN syndrome or medullary thyroid cancer. Possible side effects including nausea, bloating, other GI upset and rarely pancreatitis were discussed. She was advised to call the office with any symptoms or concerns.   -Continue Tresiba 20 units QHS.  -Continue Jardiance 10 mg QD. Discussed the medication's MOA and that it may cause more frequent urination particularly upon starting the medication. Take one tablet in the morning with or without food. Encouraged to drink plenty of water as to not get dehydrated especially in warmer weather or with activity. Also discussed there may be an increased incidence of UTIs or yeast infections and to monitor for signs/symptoms.  -S/S hypoglycemia reviewed with Rule of 15's advised.  -Follow-up in 3 months.

## 2024-04-30 ENCOUNTER — OFFICE VISIT (OUTPATIENT)
Dept: ENDOCRINOLOGY | Facility: CLINIC | Age: 64
End: 2024-04-30
Payer: MEDICARE

## 2024-04-30 VITALS
HEART RATE: 96 BPM | SYSTOLIC BLOOD PRESSURE: 129 MMHG | OXYGEN SATURATION: 97 % | BODY MASS INDEX: 30.18 KG/M2 | HEIGHT: 69 IN | WEIGHT: 203.8 LBS | DIASTOLIC BLOOD PRESSURE: 74 MMHG

## 2024-04-30 DIAGNOSIS — E11.65 TYPE 2 DIABETES MELLITUS WITH HYPERGLYCEMIA, WITHOUT LONG-TERM CURRENT USE OF INSULIN: Primary | ICD-10-CM

## 2024-04-30 LAB
EXPIRATION DATE: NORMAL
GLUCOSE BLDC GLUCOMTR-MCNC: 129 MG/DL (ref 70–130)
Lab: NORMAL

## 2024-04-30 PROCEDURE — 82043 UR ALBUMIN QUANTITATIVE: CPT | Performed by: NURSE PRACTITIONER

## 2024-04-30 PROCEDURE — 3051F HG A1C>EQUAL 7.0%<8.0%: CPT | Performed by: NURSE PRACTITIONER

## 2024-04-30 PROCEDURE — 1159F MED LIST DOCD IN RCRD: CPT | Performed by: NURSE PRACTITIONER

## 2024-04-30 PROCEDURE — 3078F DIAST BP <80 MM HG: CPT | Performed by: NURSE PRACTITIONER

## 2024-04-30 PROCEDURE — 99214 OFFICE O/P EST MOD 30 MIN: CPT | Performed by: NURSE PRACTITIONER

## 2024-04-30 PROCEDURE — 1160F RVW MEDS BY RX/DR IN RCRD: CPT | Performed by: NURSE PRACTITIONER

## 2024-04-30 PROCEDURE — 82947 ASSAY GLUCOSE BLOOD QUANT: CPT | Performed by: NURSE PRACTITIONER

## 2024-04-30 PROCEDURE — 3074F SYST BP LT 130 MM HG: CPT | Performed by: NURSE PRACTITIONER

## 2024-04-30 PROCEDURE — 82570 ASSAY OF URINE CREATININE: CPT | Performed by: NURSE PRACTITIONER

## 2024-04-30 RX ORDER — SEMAGLUTIDE 1.34 MG/ML
1 INJECTION, SOLUTION SUBCUTANEOUS WEEKLY
Qty: 3 ML | Refills: 2 | Status: SHIPPED | OUTPATIENT
Start: 2024-04-30

## 2024-04-30 NOTE — PROGRESS NOTES
Chief Complaint   Patient presents with    Follow-up        Referring Provider  No ref. provider found     HPI   Donna Israel is a 64 y.o. female had concerns including Follow-up.    T2DM.    She reports that she has been doing well.  She denies any changes to her health since last visit.  Denies any overt hyper/hypos.    Diabetes:  Diabetes was diagnosed 1990's.  Complications include neuropathy.  Last ophtho exam was due.  Current medications for diabetes include Ozempic 0.5 mg weekly, Tresiba 20 units QHS, Jardiance 25 mg QD.  Previous meds: Metformin, Januvia, Tresiba  She checks her blood sugar rarely.   Hypos: none    She had labs that showed negative ZARA and islet cell antibodies.    ACE/ARB:yes, Statin: no  Labs:  Lipid Panel:utd  ALVERTO: utd     The following portions of the patient's history were reviewed and updated as appropriate: allergies, current medications, past family history, past medical history, past social history, past surgical history, and problem list.    Diet: she tries to limit her carbs, she has not been limiting sugars    Past Medical History:   Diagnosis Date    Arthritis     osteo    Edema     GERD (gastroesophageal reflux disease)     Hyperlipidemia     Hypertension     Hypothyroidism     Inj musc/tend the rotator cuff of right shoulder, subs     Lumbago     Memory loss     Obesity     Osteopenia     Pain in right shoulder     Stiffness of right shoulder joint     Tobacco use disorder     Type 2 diabetes mellitus     Vitamin D deficiency      Past Surgical History:   Procedure Laterality Date    ROTATOR CUFF REPAIR Right     TUBAL ABDOMINAL LIGATION        Family History   Problem Relation Age of Onset    Arthritis Mother     COPD Mother     Hyperlipidemia Mother     Hypertension Mother     Thyroid disease Mother     Alcohol abuse Father     Arthritis Father     Cancer Father     Diabetes Father     Hyperlipidemia Father     Arthritis Sister     COPD Sister     Diabetes Sister      Heart disease Sister     Hyperlipidemia Sister     Hypertension Sister     Thyroid disease Sister     Arthritis Sister     Diabetes Sister     Hyperlipidemia Sister     Thyroid disease Sister     Thyroid disease Sister     Diabetes Brother     Hyperlipidemia Brother     Hypertension Brother     Heart disease Maternal Grandmother     Breast cancer Maternal Aunt     Breast cancer Maternal Aunt       Social History     Socioeconomic History    Marital status:     Number of children: 3    Highest education level: GED or equivalent   Tobacco Use    Smoking status: Every Day     Current packs/day: 0.50     Types: Cigarettes    Smokeless tobacco: Never   Substance and Sexual Activity    Alcohol use: No    Drug use: No    Sexual activity: Not Currently     Partners: Male     Birth control/protection: None      Allergies   Allergen Reactions    Ibuprofen Urinary Retention      Current Outpatient Medications on File Prior to Visit   Medication Sig Dispense Refill    Accu-Chek Softclix Lancets lancets Use as instructed 100 each 5    Blood Glucose Monitoring Suppl (Accu-Chek Guide) w/Device kit Use as directed per Prescriber 1 kit 0    empagliflozin (Jardiance) 25 MG tablet tablet Take 1 tablet by mouth Daily. 30 tablet 6    glucose blood test strip Use as directed by provider to check blood sugar twice daily. 100 each 5    insulin degludec (Tresiba FlexTouch) 100 UNIT/ML solution pen-injector injection Inject 20 Units under the skin into the appropriate area as directed Every Night. 6 mL 5    Insulin Pen Needle (Pen Needles) 32G X 4 MM misc Use 1 pen needle Every Night as directed by provider to inject insulin. 100 each 5    levothyroxine (SYNTHROID, LEVOTHROID) 150 MCG tablet Take 1 tablet by mouth Daily.      lisinopril (PRINIVIL,ZESTRIL) 2.5 MG tablet Take 1 tablet by mouth Daily.      omeprazole (priLOSEC) 40 MG capsule Take 1 capsule by mouth once daily 90 capsule 1    [DISCONTINUED] Semaglutide,0.25 or 0.5MG/DOS,  "(Ozempic, 0.25 or 0.5 MG/DOSE,) 2 MG/1.5ML solution pen-injector Inject 0.5 mg under the skin into the appropriate area as directed 1 (One) Time Per Week.       No current facility-administered medications on file prior to visit.        Review of Systems   Constitutional:  Positive for diaphoresis, fatigue and unexpected weight loss. Negative for unexpected weight gain.   Eyes:  Positive for blurred vision.   Endocrine: Positive for polydipsia and polyuria. Negative for polyphagia.   Psychiatric/Behavioral:  Positive for sleep disturbance.    All other systems reviewed and are negative.     /74 (BP Location: Right arm, Patient Position: Sitting, Cuff Size: Adult)   Pulse 96   Ht 175.3 cm (69\")   Wt 92.4 kg (203 lb 12.8 oz)   SpO2 97%   BMI 30.10 kg/m²      Physical Exam  Vitals reviewed.   Constitutional:       Appearance: Normal appearance.   Eyes:      Extraocular Movements: Extraocular movements intact.   Cardiovascular:      Rate and Rhythm: Tachycardia present.   Pulmonary:      Effort: Pulmonary effort is normal.   Skin:     General: Skin is warm.   Neurological:      General: No focal deficit present.      Mental Status: She is alert and oriented to person, place, and time.   Psychiatric:         Mood and Affect: Mood normal.         Behavior: Behavior normal.         Thought Content: Thought content normal.         Judgment: Judgment normal.       CMP:  Lab Results   Component Value Date    BUN 8 12/09/2019    CREATININE 0.69 12/09/2019    EGFRIFNONA 87 12/09/2019    BCR 11.6 12/09/2019     12/09/2019    K 3.8 12/09/2019    CO2 22.1 12/09/2019    CALCIUM 8.9 12/09/2019    ALBUMIN 3.80 12/09/2019    LABIL2 1.4 (L) 03/18/2016    BILITOT 0.4 12/09/2019    ALKPHOS 65 12/09/2019    AST 25 12/09/2019    ALT 20 12/09/2019     Lipid Panel:  Lab Results   Component Value Date    CHOL 172 12/09/2019    TRIG 380 (H) 12/09/2019    HDL 24 (L) 12/09/2019    VLDL 76 (H) 12/09/2019    LDL 72 12/09/2019 "     HbA1c:  Lab Results   Component Value Date    HGBA1C 7.2 (A) 01/25/2024    HGBA1C 11.5 10/05/2023   7.4 (03/2024)    Glucose:  Lab Results   Component Value Date    POCGLU 129 04/30/2024     Microalbumin:  Lab Results   Component Value Date    ANDREYO  08/29/2019      Comment:      Unable to calculate     TSH:  Lab Results   Component Value Date    TSH 1.100 12/09/2019       Assessment and Plan    Diagnoses and all orders for this visit:    1. Type 2 diabetes mellitus with hyperglycemia, without long-term current use of insulin (Primary)  Assessment & Plan:  -Diabetes is improving with A1c down to 7.4.  -Discussed dietary and exercise guidelines with patient.    -Discussed the importance of yearly eye exams.  -Discussed the importance of checking BG's regularly.    -Increase Ozempic 1 mg weekly.  Patient has no personal history of pancreatitis, no family history of MEN syndrome or medullary thyroid cancer. Possible side effects including nausea, bloating, other GI upset and rarely pancreatitis were discussed. She was advised to call the office with any symptoms or concerns.   -Continue Tresiba 20 units QHS.  -Continue Jardiance 25 mg QD. Discussed the medication's MOA and that it may cause more frequent urination particularly upon starting the medication. Take one tablet in the morning with or without food. Encouraged to drink plenty of water as to not get dehydrated especially in warmer weather or with activity. Also discussed there may be an increased incidence of UTIs or yeast infections and to monitor for signs/symptoms.  -Discussed that increasing GLP-1 with note improvement of BG's.  Discussed if hypos start, to reduce her Tresiba by 5 units until hypos resolve.  -S/S hypoglycemia reviewed with Rule of 15's advised.  -Follow-up in 3 months.     Orders:  -     POC Glucose, Blood  -     Microalbumin / Creatinine Urine Ratio - Urine, Clean Catch    Other orders  -     Semaglutide, 1 MG/DOSE, (Ozempic, 1  MG/DOSE,) 4 MG/3ML solution pen-injector; Inject 1 mg under the skin into the appropriate area as directed 1 (One) Time Per Week.  Dispense: 3 mL; Refill: 2           Return in about 3 months (around 7/30/2024) for Follow-up appointment, A1C. The patient was instructed to contact the clinic with any interval questions or concerns.        This document has been electronically signed by BRIAN Zelaya  April 30, 2024 15:27 EDT   Endocrinology    Please note that portions of this document were completed with a voice recognition program. Efforts were made to edit the dictations, but occasionally words are mis-transcribed.

## 2024-04-30 NOTE — ASSESSMENT & PLAN NOTE
-Diabetes is improving with A1c down to 7.4.  -Discussed dietary and exercise guidelines with patient.    -Discussed the importance of yearly eye exams.  -Discussed the importance of checking BG's regularly.    -Increase Ozempic 1 mg weekly.  Patient has no personal history of pancreatitis, no family history of MEN syndrome or medullary thyroid cancer. Possible side effects including nausea, bloating, other GI upset and rarely pancreatitis were discussed. She was advised to call the office with any symptoms or concerns.   -Continue Tresiba 20 units QHS.  -Continue Jardiance 25 mg QD. Discussed the medication's MOA and that it may cause more frequent urination particularly upon starting the medication. Take one tablet in the morning with or without food. Encouraged to drink plenty of water as to not get dehydrated especially in warmer weather or with activity. Also discussed there may be an increased incidence of UTIs or yeast infections and to monitor for signs/symptoms.  -Discussed that increasing GLP-1 with note improvement of BG's.  Discussed if hypos start, to reduce her Tresiba by 5 units until hypos resolve.  -S/S hypoglycemia reviewed with Rule of 15's advised.  -Follow-up in 3 months.

## 2024-05-01 LAB
ALBUMIN/CREAT UR: <8 MG/G CREAT (ref 0–29)
CREAT UR-MCNC: 38 MG/DL
MICROALBUMIN UR-MCNC: <3 UG/ML

## 2024-07-10 DIAGNOSIS — E11.65 TYPE 2 DIABETES MELLITUS WITH HYPERGLYCEMIA, WITHOUT LONG-TERM CURRENT USE OF INSULIN: ICD-10-CM

## 2024-07-10 RX ORDER — INSULIN DEGLUDEC 100 U/ML
20 INJECTION, SOLUTION SUBCUTANEOUS NIGHTLY
Qty: 6 ML | Refills: 5 | Status: SHIPPED | OUTPATIENT
Start: 2024-07-10

## 2024-08-28 ENCOUNTER — OFFICE VISIT (OUTPATIENT)
Dept: ENDOCRINOLOGY | Facility: CLINIC | Age: 64
End: 2024-08-28
Payer: MEDICARE

## 2024-08-28 ENCOUNTER — SPECIALTY PHARMACY (OUTPATIENT)
Dept: PHARMACY | Facility: HOSPITAL | Age: 64
End: 2024-08-28
Payer: MEDICARE

## 2024-08-28 VITALS
WEIGHT: 187.2 LBS | DIASTOLIC BLOOD PRESSURE: 74 MMHG | OXYGEN SATURATION: 99 % | SYSTOLIC BLOOD PRESSURE: 118 MMHG | BODY MASS INDEX: 27.64 KG/M2 | HEART RATE: 88 BPM

## 2024-08-28 DIAGNOSIS — E11.65 TYPE 2 DIABETES MELLITUS WITH HYPERGLYCEMIA, WITHOUT LONG-TERM CURRENT USE OF INSULIN: Primary | ICD-10-CM

## 2024-08-28 DIAGNOSIS — E03.9 ACQUIRED HYPOTHYROIDISM: ICD-10-CM

## 2024-08-28 LAB
EXPIRATION DATE: ABNORMAL
GLUCOSE BLDC GLUCOMTR-MCNC: 107 MG/DL (ref 70–130)
HBA1C MFR BLD: 6 % (ref 4.5–5.7)
Lab: ABNORMAL

## 2024-08-28 PROCEDURE — 84443 ASSAY THYROID STIM HORMONE: CPT | Performed by: NURSE PRACTITIONER

## 2024-08-28 PROCEDURE — 84439 ASSAY OF FREE THYROXINE: CPT | Performed by: NURSE PRACTITIONER

## 2024-08-28 RX ORDER — INSULIN DEGLUDEC 100 U/ML
20 INJECTION, SOLUTION SUBCUTANEOUS NIGHTLY
Qty: 6 ML | Refills: 5 | Status: SHIPPED | OUTPATIENT
Start: 2024-08-28

## 2024-08-28 RX ORDER — PEN NEEDLE, DIABETIC 30 GX3/16"
1 NEEDLE, DISPOSABLE MISCELLANEOUS NIGHTLY
Qty: 100 EACH | Refills: 5 | Status: SHIPPED | OUTPATIENT
Start: 2024-08-28

## 2024-08-28 RX ORDER — SEMAGLUTIDE 1.34 MG/ML
1 INJECTION, SOLUTION SUBCUTANEOUS WEEKLY
Qty: 3 ML | Refills: 2 | Status: SHIPPED | OUTPATIENT
Start: 2024-08-28

## 2024-08-28 NOTE — ASSESSMENT & PLAN NOTE
-Diabetes is improving with A1c 6.0.  -Discussed dietary and exercise guidelines with patient.    -Discussed the importance of yearly eye exams.  -Discussed the importance of checking BG's regularly.    -Continue Ozempic 1 mg weekly.  Patient has no personal history of pancreatitis, no family history of MEN syndrome or medullary thyroid cancer. Possible side effects including nausea, bloating, other GI upset and rarely pancreatitis were discussed. She was advised to call the office with any symptoms or concerns.   -Continue Tresiba 20 units QHS.  -Continue Jardiance 25 mg QD. Discussed the medication's MOA and that it may cause more frequent urination particularly upon starting the medication. Take one tablet in the morning with or without food. Encouraged to drink plenty of water as to not get dehydrated especially in warmer weather or with activity. Also discussed there may be an increased incidence of UTIs or yeast infections and to monitor for signs/symptoms.  -S/S hypoglycemia reviewed with Rule of 15's advised.  -Follow-up in 3 months.

## 2024-08-28 NOTE — ASSESSMENT & PLAN NOTE
-Clinically euthyroid.  -TFT's today with medication adjustment accordingly.  -Reminded of proper administration including taking 7 pills, once daily, per week on an empty stomach with no missed doses, waiting 30-60 minutes prior to other medications or food.  -Follow-up in 3 months.

## 2024-08-28 NOTE — PROGRESS NOTES
Specialty Pharmacy Patient Management Program  Endocrinology Initial Assessment       Donna Israel is a 64 y.o. female with Type 2 Diabetes seen by an Endocrinology provider and enrolled in the Endocrinology Patient Management program offered by [facility].  A follow-up outreach was conducted, including assessment of continued therapy appropriateness, side effect incidence, medication adherence, and management for Ozempic, Jardiance, and insulin therapy.     Patient is currently taking Ozempic 1 mg weekly, Jardiance 25 mg daily, and Tresiba 20 units nightly. She denies side effects or missed doses to current regimen. She reports she checks her BG twice a week with readings in 110s. She denies BG < 70.     Patient denies personal/family history of thyroid cancer, denies history of pancreatitis, and denies issues with recurrent UTI/yeast infections (states she may have 1-2 UTIs a year, states they are not often and clear with treatment).     States she did have kidney issues, after taking ibuprofen (caused urinary retention). States her kidney's have improved since then and she has not needed any dose adjustments based on her kidney function. Has seen a nephrologist in the past (maybe [facility]).       In the past, the patient has tried:     Drug Dose Reason for Discontinuation Notes   Januvia  Alternative Therapy    Tresiba  Alternative Therapy    Metformin   Was able to tolerate                 Insurance Coverage & Financial Support  None noted at this time    Relevant Past Medical History and Comorbidities  Relevant medical history and concomitant health conditions were discussed with the patient. The patient's chart has been reviewed for relevant past medical history and comorbid health conditions and updated as necessary.   Past Medical History:   Diagnosis Date    Arthritis     osteo    Edema     GERD (gastroesophageal reflux disease)     Hyperlipidemia     Hypertension      Hypothyroidism     Inj musc/tend the rotator cuff of right shoulder, subs     Lumbago     Memory loss     Obesity     Osteopenia     Pain in right shoulder     Stiffness of right shoulder joint     Tobacco use disorder     Type 2 diabetes mellitus     Vitamin D deficiency      Social History     Socioeconomic History    Marital status:     Number of children: 3    Highest education level: GED or equivalent   Tobacco Use    Smoking status: Every Day     Current packs/day: 0.50     Types: Cigarettes    Smokeless tobacco: Never   Substance and Sexual Activity    Alcohol use: No    Drug use: No    Sexual activity: Not Currently     Partners: Male     Birth control/protection: None       Problem list reviewed by Tish Matthew, Juan F on 8/28/2024 at 11:53 AM    Allergies  Known allergies and reactions were discussed with the patient. The patient's chart has been reviewed for  allergy information and updated as necessary.   Allergies   Allergen Reactions    Ibuprofen Urinary Retention       Allergies reviewed by Tish Matthew PharmD on 8/28/2024 at 11:53 AM    Relevant Laboratory Values  A1C Last 3 Results          10/5/2023    13:58 1/25/2024    13:38 8/28/2024    11:22   HGBA1C Last 3 Results   Hemoglobin A1C 11.5  7.2  6.0      Lab Results   Component Value Date    HGBA1C 6.0 (A) 08/28/2024     Lab Results   Component Value Date    GLUCOSE 153 (H) 12/09/2019    CALCIUM 8.9 12/09/2019     12/09/2019    K 3.8 12/09/2019    CO2 22.1 12/09/2019     12/09/2019    BUN 8 12/09/2019    CREATININE 0.69 12/09/2019    EGFRIFNONA 87 12/09/2019    BCR 11.6 12/09/2019    ANIONGAP 14.9 12/09/2019     Lab Results   Component Value Date    CHOL 172 12/09/2019    CHLPL 196 03/18/2016    TRIG 380 (H) 12/09/2019    HDL 24 (L) 12/09/2019    LDL 72 12/09/2019     Microalbumin          4/30/2024    14:47   Microalbumin   Microalbumin, Urine <3.0        Current Medication List  This medication list has been reviewed with  the patient and evaluated for any interactions or necessary modifications/recommendations, and updated to include all prescription medications, OTC medications, and supplements the patient is currently taking.  This list reflects what is contained in the patient's profile, which has also been marked as reviewed to communicate to other providers it is the most up to date version of the patient's current medication therapy.     Current Outpatient Medications:     Accu-Chek Softclix Lancets lancets, Use as instructed, Disp: 100 each, Rfl: 5    Blood Glucose Monitoring Suppl (Accu-Chek Guide) w/Device kit, Use as directed per Prescriber, Disp: 1 kit, Rfl: 0    glucose blood test strip, Use as directed by provider to check blood sugar twice daily., Disp: 100 each, Rfl: 5    levothyroxine (SYNTHROID, LEVOTHROID) 150 MCG tablet, Take 1 tablet by mouth Daily., Disp: , Rfl:     lisinopril (PRINIVIL,ZESTRIL) 2.5 MG tablet, Take 1 tablet by mouth Daily., Disp: , Rfl:     omeprazole (priLOSEC) 40 MG capsule, Take 1 capsule by mouth once daily (Patient taking differently: 1 capsule As Needed.), Disp: 90 capsule, Rfl: 1    empagliflozin (Jardiance) 25 MG tablet tablet, Take 1 tablet by mouth Daily., Disp: 30 tablet, Rfl: 6    insulin degludec (Tresiba FlexTouch) 100 UNIT/ML solution pen-injector injection, Inject 20 Units under the skin into the appropriate area as directed Every Night., Disp: 6 mL, Rfl: 5    Insulin Pen Needle (Pen Needles) 32G X 4 MM misc, Use 1 pen needle Every Night as directed by provider to inject insulin., Disp: 100 each, Rfl: 5    Semaglutide, 1 MG/DOSE, (Ozempic, 1 MG/DOSE,) 4 MG/3ML solution pen-injector, Inject 1 mg under the skin into the appropriate area as directed 1 (One) Time Per Week., Disp: 3 mL, Rfl: 2    Medicines reviewed by Tish Matthew, PharmD on 8/28/2024 at 11:53 AM    Drug Interactions  No significant drug-drug interactions with diabetes medications expected according to  literature.      Recommended Medications Assessment  Aspirin -  Not Taking Currently   Statin -  Not Taking Currently  ACEi/ARB - Currently Taking     Current 10-Year ASCVD Risk: 25.4% (Calculated with most recent labs from 01/2023)     Goals of Therapy  Goals related to the patient's specialty therapy were discussed with the patient. The Patient Goals segment of this outreach has been reviewed and updated.    Goals Addressed Today        HEMOGLOBIN A1C < 7                     Reassessment Plan & Follow-Up  Patient's diabetes is controlled with A1C of 6.0%.  Medication Therapy Changes: None discussed with patient  Related Plans, Therapy Recommendations or Therapy Problems to Be Addressed: No pharmacologic adjustments recommended.       Attestation  I attest the patient was actively involved in and has agreed to the above plan of care. If the prescribed therapy is at any point deemed not appropriate based on the current or future assessments, a consultation will be initiated with the patient's specialty care provider to determine the best course of action. The revised plan of therapy will be documented along with any required assessments and/or additional patient education provided.    Hayley Armas, Pharmacy Intern   Tish Matthew, PharmD, BHART[facility] FIERRO  Clinical Specialty Pharmacist, Endocrinology   08/28/24  11:54 EDT

## 2024-08-29 LAB
T4 FREE SERPL-MCNC: 1.74 NG/DL (ref 0.82–1.77)
TSH SERPL DL<=0.005 MIU/L-ACNC: 0.03 UIU/ML (ref 0.45–4.5)

## 2024-08-29 RX ORDER — LEVOTHYROXINE SODIUM 125 UG/1
125 TABLET ORAL DAILY
Qty: 90 TABLET | Refills: 1 | Status: SHIPPED | OUTPATIENT
Start: 2024-08-29

## 2024-08-29 RX ORDER — EMPAGLIFLOZIN 25 MG/1
25 TABLET, FILM COATED ORAL DAILY
Qty: 30 TABLET | Refills: 6 | Status: SHIPPED | OUTPATIENT
Start: 2024-08-29

## 2024-11-27 RX ORDER — PRAVASTATIN SODIUM 20 MG
20 TABLET ORAL EVERY EVENING
Qty: 30 TABLET | Refills: 11 | Status: SHIPPED | OUTPATIENT
Start: 2024-11-27 | End: 2025-11-27

## 2024-12-02 RX ORDER — SEMAGLUTIDE 1.34 MG/ML
INJECTION, SOLUTION SUBCUTANEOUS
Qty: 3 ML | Refills: 2 | Status: SHIPPED | OUTPATIENT
Start: 2024-12-02

## 2024-12-19 ENCOUNTER — TRANSCRIBE ORDERS (OUTPATIENT)
Dept: ADMINISTRATIVE | Facility: HOSPITAL | Age: 64
End: 2024-12-19
Payer: MEDICARE

## 2024-12-19 DIAGNOSIS — Z12.31 VISIT FOR SCREENING MAMMOGRAM: Primary | ICD-10-CM

## 2024-12-19 DIAGNOSIS — Z78.0 MENOPAUSE: ICD-10-CM

## 2025-01-29 ENCOUNTER — OFFICE VISIT (OUTPATIENT)
Dept: ENDOCRINOLOGY | Facility: CLINIC | Age: 65
End: 2025-01-29
Payer: MEDICARE

## 2025-01-29 VITALS
SYSTOLIC BLOOD PRESSURE: 95 MMHG | DIASTOLIC BLOOD PRESSURE: 59 MMHG | WEIGHT: 175.6 LBS | BODY MASS INDEX: 25.93 KG/M2 | HEART RATE: 95 BPM | OXYGEN SATURATION: 96 %

## 2025-01-29 DIAGNOSIS — E11.65 TYPE 2 DIABETES MELLITUS WITH HYPERGLYCEMIA, WITHOUT LONG-TERM CURRENT USE OF INSULIN: Primary | ICD-10-CM

## 2025-01-29 LAB
EXPIRATION DATE: ABNORMAL
EXPIRATION DATE: NORMAL
GLUCOSE BLDC GLUCOMTR-MCNC: 131 MG/DL (ref 70–130)
HBA1C MFR BLD: 5.7 % (ref 4.5–5.7)
Lab: ABNORMAL
Lab: NORMAL

## 2025-01-29 NOTE — PROGRESS NOTES
Chief Complaint   Patient presents with    Diabetes     F/u        Referring Provider  No ref. provider found     HPI   Donna Israel is a 64 y.o. female had concerns including Diabetes (F/u).    T2DM.    She reports that she has been doing well.  She denies any changes to her health since last visit.  Denies any overt hyper/hypos.     She is having increased constipation and has been having to take some laxatives.  Otherwise, she is not having any adverse effects to her medication.     Diabetes:  Diabetes was diagnosed 1990's.  Complications include neuropathy.  Last ophtho exam was due.  Current medications for diabetes include Ozempic 1 mg weekly, Tresiba 20 units QHS, Jardiance 25 mg QD.  Previous meds: Metformin, Januvia, Tresiba  She checks her blood sugar rarely.   Hypos: none    She had labs that showed negative ZARA and islet cell antibodies.    ACE/ARB:yes, Statin: no  Labs:  Lipid Panel:utd  ALVERTO: utd     Thyroid:  She is taking her T4 regularly without missing doses.  She is currently taking T4 125 mcg QD.  She denies any conflicting medication and denies any compressive s/sx's.  She is tolerating her meds well.    The following portions of the patient's history were reviewed and updated as appropriate: allergies, current medications, past family history, past medical history, past social history, past surgical history, and problem list.    Diet: she tries to limit her carbs, she has not been limiting sugars    Past Medical History:   Diagnosis Date    Arthritis     osteo    Edema     GERD (gastroesophageal reflux disease)     Hyperlipidemia     Hypertension     Hypothyroidism     Inj musc/tend the rotator cuff of right shoulder, subs     Lumbago     Memory loss     Obesity     Osteopenia     Pain in right shoulder     Stiffness of right shoulder joint     Tobacco use disorder     Type 2 diabetes mellitus     Vitamin D deficiency      Past Surgical History:   Procedure Laterality Date    ROTATOR CUFF  REPAIR Right     TUBAL ABDOMINAL LIGATION        Family History   Problem Relation Age of Onset    Arthritis Mother     COPD Mother     Hyperlipidemia Mother     Hypertension Mother     Thyroid disease Mother     Alcohol abuse Father     Arthritis Father     Cancer Father     Diabetes Father     Hyperlipidemia Father     Arthritis Sister     COPD Sister     Diabetes Sister     Heart disease Sister     Hyperlipidemia Sister     Hypertension Sister     Thyroid disease Sister     Arthritis Sister     Diabetes Sister     Hyperlipidemia Sister     Thyroid disease Sister     Thyroid disease Sister     Diabetes Brother     Hyperlipidemia Brother     Hypertension Brother     Heart disease Maternal Grandmother     Breast cancer Maternal Aunt     Breast cancer Maternal Aunt       Social History     Socioeconomic History    Marital status:     Number of children: 3    Highest education level: GED or equivalent   Tobacco Use    Smoking status: Every Day     Current packs/day: 0.50     Types: Cigarettes     Passive exposure: Current    Smokeless tobacco: Never   Vaping Use    Vaping status: Never Used   Substance and Sexual Activity    Alcohol use: No    Drug use: No    Sexual activity: Not Currently     Partners: Male     Birth control/protection: None      Allergies   Allergen Reactions    Ibuprofen Urinary Retention      Current Outpatient Medications on File Prior to Visit   Medication Sig Dispense Refill    Accu-Chek Softclix Lancets lancets Use as instructed 100 each 5    Blood Glucose Monitoring Suppl (Accu-Chek Guide) w/Device kit Use as directed per Prescriber 1 kit 0    glucose blood test strip Use as directed by provider to check blood sugar twice daily. 100 each 5    insulin degludec (Tresiba FlexTouch) 100 UNIT/ML solution pen-injector injection Inject 20 Units under the skin into the appropriate area as directed Every Night. 6 mL 5    Insulin Pen Needle (Pen Needles) 32G X 4 MM misc Use 1 pen needle Every  Night as directed by provider to inject insulin. 100 each 5    Jardiance 25 MG tablet tablet TAKE 1 TABLET BY MOUTH DAILY 30 tablet 6    levothyroxine (SYNTHROID, LEVOTHROID) 125 MCG tablet Take 1 tablet by mouth Daily. 90 tablet 1    lisinopril (PRINIVIL,ZESTRIL) 2.5 MG tablet Take 1 tablet by mouth Daily.      omeprazole (priLOSEC) 40 MG capsule Take 1 capsule by mouth once daily (Patient taking differently: 1 capsule As Needed.) 90 capsule 1    Ozempic, 1 MG/DOSE, 4 MG/3ML solution pen-injector INJECT 1MG UNDER THE SKIN EVERY WEEK 3 mL 2    pravastatin (Pravachol) 20 MG tablet Take 1 tablet by mouth Every Evening. (Patient not taking: Reported on 1/29/2025) 30 tablet 11     No current facility-administered medications on file prior to visit.        Review of Systems   Constitutional:  Positive for diaphoresis, fatigue and unexpected weight loss. Negative for unexpected weight gain.   Eyes:  Positive for blurred vision.   Endocrine: Positive for polydipsia and polyuria. Negative for polyphagia.   Psychiatric/Behavioral:  Positive for sleep disturbance.    All other systems reviewed and are negative.   Symptoms are present but improving.    BP 95/59 (BP Location: Right arm, Patient Position: Sitting, Cuff Size: Adult)   Pulse 95   Wt 79.7 kg (175 lb 9.6 oz)   SpO2 96%   BMI 25.93 kg/m²      Physical Exam  Vitals reviewed.   Constitutional:       Appearance: Normal appearance.   Eyes:      Extraocular Movements: Extraocular movements intact.   Cardiovascular:      Rate and Rhythm: Normal rate.   Pulmonary:      Effort: Pulmonary effort is normal.   Skin:     General: Skin is warm.   Neurological:      General: No focal deficit present.      Mental Status: She is alert and oriented to person, place, and time.   Psychiatric:         Mood and Affect: Mood normal.         Behavior: Behavior normal.         Thought Content: Thought content normal.         Judgment: Judgment normal.       CMP:  Lab Results   Component  Value Date    Glucose 131 (A) 01/29/2025    BUN 8 12/09/2019    BUN/Creatinine Ratio 11.6 12/09/2019    Creatinine 0.69 12/09/2019    Creatinine, Urine 38.0 04/30/2024    Creatinine, Urine 86.5 08/29/2019    CO2 22.1 12/09/2019    Calcium 8.9 12/09/2019    Albumin 3.80 12/09/2019    AST (SGOT) 25 12/09/2019    ALT (SGPT) 20 12/09/2019     Lipid Panel:  Lab Results   Component Value Date    CHOL 172 12/09/2019    TRIG 380 (H) 12/09/2019    HDL 24 (L) 12/09/2019    VLDL 76 (H) 12/09/2019    LDL 72 12/09/2019     HbA1c:  Lab Results   Component Value Date    HGBA1C 5.7 01/29/2025   7.4 (03/2024)    Glucose:  Lab Results   Component Value Date    POCGLU 131 (A) 01/29/2025     Microalbumin:  Lab Results   Component Value Date    MALBCRERATIO <8 04/30/2024     TSH:  Lab Results   Component Value Date    TSH 0.031 (L) 08/28/2024       Assessment and Plan    Diagnoses and all orders for this visit:    1. Type 2 diabetes mellitus with hyperglycemia, without long-term current use of insulin (Primary)  Assessment & Plan:  -Diabetes is improving with A1c 5.7.  -Discussed dietary and exercise guidelines with patient.    -Discussed the importance of yearly eye exams.  -Discussed the importance of checking BG's regularly.    -Continue Ozempic 1 mg weekly.  Patient has no personal history of pancreatitis, no family history of MEN syndrome or medullary thyroid cancer. Possible side effects including nausea, bloating, other GI upset and rarely pancreatitis were discussed. She was advised to call the office with any symptoms or concerns.   -Continue Tresiba 20 units QHS.  -Continue Jardiance 25 mg QD. Discussed the medication's MOA and that it may cause more frequent urination particularly upon starting the medication. Take one tablet in the morning with or without food. Encouraged to drink plenty of water as to not get dehydrated especially in warmer weather or with activity. Also discussed there may be an increased incidence of UTIs  or yeast infections and to monitor for signs/symptoms.  -S/S hypoglycemia reviewed with Rule of 15's advised.  -Follow-up in 3 months.     Orders:  -     POC Glycosylated Hemoglobin (Hb A1C)  -     POC Glucose, Blood             Return in about 3 months (around 4/29/2025) for Follow-up appointment, A1C. The patient was instructed to contact the clinic with any interval questions or concerns.        This document has been electronically signed by BRIAN Zelaya  January 29, 2025 14:30 EST   Endocrinology    Please note that portions of this document were completed with a voice recognition program. Efforts were made to edit the dictations, but occasionally words are mis-transcribed.

## 2025-01-29 NOTE — ASSESSMENT & PLAN NOTE
-Diabetes is improving with A1c 5.7.  -Discussed dietary and exercise guidelines with patient.    -Discussed the importance of yearly eye exams.  -Discussed the importance of checking BG's regularly.    -Continue Ozempic 1 mg weekly.  Patient has no personal history of pancreatitis, no family history of MEN syndrome or medullary thyroid cancer. Possible side effects including nausea, bloating, other GI upset and rarely pancreatitis were discussed. She was advised to call the office with any symptoms or concerns.   -Continue Tresiba 20 units QHS.  -Continue Jardiance 25 mg QD. Discussed the medication's MOA and that it may cause more frequent urination particularly upon starting the medication. Take one tablet in the morning with or without food. Encouraged to drink plenty of water as to not get dehydrated especially in warmer weather or with activity. Also discussed there may be an increased incidence of UTIs or yeast infections and to monitor for signs/symptoms.  -S/S hypoglycemia reviewed with Rule of 15's advised.  -Follow-up in 3 months.

## 2025-02-04 ENCOUNTER — TRANSCRIBE ORDERS (OUTPATIENT)
Dept: ADMINISTRATIVE | Facility: HOSPITAL | Age: 65
End: 2025-02-04
Payer: MEDICARE

## 2025-02-04 DIAGNOSIS — Z78.0 MENOPAUSE: ICD-10-CM

## 2025-02-04 DIAGNOSIS — Z12.31 VISIT FOR SCREENING MAMMOGRAM: Primary | ICD-10-CM

## 2025-03-18 DIAGNOSIS — E11.65 TYPE 2 DIABETES MELLITUS WITH HYPERGLYCEMIA, WITHOUT LONG-TERM CURRENT USE OF INSULIN: ICD-10-CM

## 2025-03-18 RX ORDER — INSULIN DEGLUDEC 100 U/ML
20 INJECTION, SOLUTION SUBCUTANEOUS NIGHTLY
Qty: 6 ML | Refills: 5 | Status: SHIPPED | OUTPATIENT
Start: 2025-03-18

## 2025-04-03 RX ORDER — SEMAGLUTIDE 1.34 MG/ML
1 INJECTION, SOLUTION SUBCUTANEOUS WEEKLY
Qty: 3 ML | Refills: 2 | Status: SHIPPED | OUTPATIENT
Start: 2025-04-03

## 2025-04-14 ENCOUNTER — HOSPITAL ENCOUNTER (OUTPATIENT)
Dept: MAMMOGRAPHY | Facility: HOSPITAL | Age: 65
Discharge: HOME | End: 2025-04-14
Payer: MEDICARE

## 2025-04-14 ENCOUNTER — HOSPITAL ENCOUNTER (OUTPATIENT)
Dept: BONE DENSITY | Facility: HOSPITAL | Age: 65
Discharge: HOME | End: 2025-04-14
Payer: MEDICARE

## 2025-04-14 DIAGNOSIS — Z12.31 VISIT FOR SCREENING MAMMOGRAM: ICD-10-CM

## 2025-04-14 DIAGNOSIS — Z78.0 MENOPAUSE: ICD-10-CM

## 2025-04-14 PROCEDURE — 77063 BREAST TOMOSYNTHESIS BI: CPT | Performed by: RADIOLOGY

## 2025-04-14 PROCEDURE — 77080 DXA BONE DENSITY AXIAL: CPT

## 2025-04-14 PROCEDURE — 77067 SCR MAMMO BI INCL CAD: CPT

## 2025-04-14 PROCEDURE — 77067 SCR MAMMO BI INCL CAD: CPT | Performed by: RADIOLOGY

## 2025-04-14 PROCEDURE — 77063 BREAST TOMOSYNTHESIS BI: CPT

## 2025-04-15 PROCEDURE — 77080 DXA BONE DENSITY AXIAL: CPT | Performed by: RADIOLOGY

## 2025-05-09 ENCOUNTER — EXTERNAL PBMM DATA (OUTPATIENT)
Dept: PHARMACY | Facility: OTHER | Age: 65
End: 2025-05-09
Payer: MEDICARE

## 2025-05-27 ENCOUNTER — OFFICE VISIT (OUTPATIENT)
Dept: ENDOCRINOLOGY | Facility: CLINIC | Age: 65
End: 2025-05-27
Payer: MEDICARE

## 2025-05-27 VITALS
BODY MASS INDEX: 25.93 KG/M2 | DIASTOLIC BLOOD PRESSURE: 62 MMHG | WEIGHT: 175.6 LBS | OXYGEN SATURATION: 97 % | SYSTOLIC BLOOD PRESSURE: 104 MMHG | HEART RATE: 108 BPM

## 2025-05-27 DIAGNOSIS — E11.65 TYPE 2 DIABETES MELLITUS WITH HYPERGLYCEMIA, WITHOUT LONG-TERM CURRENT USE OF INSULIN: Primary | ICD-10-CM

## 2025-05-27 LAB
ALBUMIN SERPL-MCNC: 4.2 G/DL (ref 3.5–5.2)
ALBUMIN/GLOB SERPL: 1.8 G/DL
ALP SERPL-CCNC: 80 U/L (ref 39–117)
ALT SERPL W P-5'-P-CCNC: 13 U/L (ref 1–33)
ANION GAP SERPL CALCULATED.3IONS-SCNC: 14 MMOL/L (ref 5–15)
AST SERPL-CCNC: 28 U/L (ref 1–32)
BILIRUB SERPL-MCNC: 0.8 MG/DL (ref 0–1.2)
BUN SERPL-MCNC: 9 MG/DL (ref 8–23)
BUN/CREAT SERPL: 13.8 (ref 7–25)
CALCIUM SPEC-SCNC: 8.6 MG/DL (ref 8.6–10.5)
CHLORIDE SERPL-SCNC: 107 MMOL/L (ref 98–107)
CHOLEST SERPL-MCNC: 174 MG/DL (ref 0–200)
CO2 SERPL-SCNC: 20 MMOL/L (ref 22–29)
CREAT SERPL-MCNC: 0.65 MG/DL (ref 0.57–1)
EGFRCR SERPLBLD CKD-EPI 2021: 97.8 ML/MIN/1.73
EXPIRATION DATE: NORMAL
EXPIRATION DATE: NORMAL
GLOBULIN UR ELPH-MCNC: 2.4 GM/DL
GLUCOSE BLDC GLUCOMTR-MCNC: 100 MG/DL (ref 70–130)
GLUCOSE SERPL-MCNC: 89 MG/DL (ref 65–99)
HBA1C MFR BLD: 5.6 % (ref 4.5–5.7)
HDLC SERPL-MCNC: 29 MG/DL (ref 40–60)
LDLC SERPL CALC-MCNC: 109 MG/DL (ref 0–100)
LDLC/HDLC SERPL: 3.58 {RATIO}
Lab: NORMAL
Lab: NORMAL
POTASSIUM SERPL-SCNC: 4.9 MMOL/L (ref 3.5–5.2)
PROT SERPL-MCNC: 6.6 G/DL (ref 6–8.5)
SODIUM SERPL-SCNC: 141 MMOL/L (ref 136–145)
T4 FREE SERPL-MCNC: 1.75 NG/DL (ref 0.92–1.68)
TRIGL SERPL-MCNC: 206 MG/DL (ref 0–150)
TSH SERPL DL<=0.05 MIU/L-ACNC: 0.03 UIU/ML (ref 0.27–4.2)
VLDLC SERPL-MCNC: 36 MG/DL (ref 5–40)

## 2025-05-27 PROCEDURE — 80053 COMPREHEN METABOLIC PANEL: CPT | Performed by: NURSE PRACTITIONER

## 2025-05-27 PROCEDURE — 84443 ASSAY THYROID STIM HORMONE: CPT | Performed by: NURSE PRACTITIONER

## 2025-05-27 PROCEDURE — 84439 ASSAY OF FREE THYROXINE: CPT | Performed by: NURSE PRACTITIONER

## 2025-05-27 PROCEDURE — 80061 LIPID PANEL: CPT | Performed by: NURSE PRACTITIONER

## 2025-05-27 NOTE — PROGRESS NOTES
Chief Complaint   Patient presents with    Follow-up     Type 2 diabetes mellitus with hyperglycemia, without long-term current use of insulin            Referring Provider  No ref. provider found     HPI   Donna Israel is a 65 y.o. female had concerns including Follow-up (Type 2 diabetes mellitus with hyperglycemia, without long-term current use of insulin//).    T2DM.    She reports that she has been doing well.  She denies any changes to her health since last visit.  Denies any overt hyper/hypos.     Diabetes:  Diabetes was diagnosed 1990's.  Complications include neuropathy.  Last ophtho exam was due.  Current medications for diabetes include Ozempic 1 mg weekly, Tresiba 20 units QHS, Jardiance 25 mg QD.  Previous meds: Metformin, Januvia, Tresiba  She checks her blood sugar rarely.   Hypos: none    She had labs that showed negative ZARA and islet cell antibodies.    ACE/ARB:yes, Statin: no  Labs:  Lipid Panel:utd  ALVERTO: utd     Thyroid:  She is taking her T4 regularly without missing doses.  She is currently taking T4 125 mcg QD.  She denies any conflicting medication and denies any compressive s/sx's.  She is tolerating her meds well.    The following portions of the patient's history were reviewed and updated as appropriate: allergies, current medications, past family history, past medical history, past social history, past surgical history, and problem list.    Diet: she tries to limit her carbs, she has not been limiting sugars    Past Medical History:   Diagnosis Date    Arthritis     osteo    Edema     GERD (gastroesophageal reflux disease)     Hyperlipidemia     Hypertension     Hypothyroidism     Inj musc/tend the rotator cuff of right shoulder, subs     Lumbago     Memory loss     Obesity     Osteopenia     Pain in right shoulder     Stiffness of right shoulder joint     Tobacco use disorder     Type 2 diabetes mellitus     Vitamin D deficiency      Past Surgical History:   Procedure Laterality Date     ROTATOR CUFF REPAIR Right     TUBAL ABDOMINAL LIGATION        Family History   Problem Relation Age of Onset    Arthritis Mother     COPD Mother     Hyperlipidemia Mother     Hypertension Mother     Thyroid disease Mother     Alcohol abuse Father     Arthritis Father     Cancer Father     Diabetes Father     Hyperlipidemia Father     Arthritis Sister     COPD Sister     Diabetes Sister     Heart disease Sister     Hyperlipidemia Sister     Hypertension Sister     Thyroid disease Sister     Arthritis Sister     Diabetes Sister     Hyperlipidemia Sister     Thyroid disease Sister     Thyroid disease Sister     Diabetes Brother     Hyperlipidemia Brother     Hypertension Brother     Heart disease Maternal Grandmother     Breast cancer Maternal Aunt     Breast cancer Maternal Aunt       Social History     Socioeconomic History    Marital status:     Number of children: 3    Highest education level: GED or equivalent   Tobacco Use    Smoking status: Every Day     Current packs/day: 0.50     Types: Cigarettes     Passive exposure: Current    Smokeless tobacco: Never   Vaping Use    Vaping status: Never Used   Substance and Sexual Activity    Alcohol use: No    Drug use: No    Sexual activity: Not Currently     Partners: Male     Birth control/protection: None      Allergies   Allergen Reactions    Ibuprofen Urinary Retention      Current Outpatient Medications on File Prior to Visit   Medication Sig Dispense Refill    Accu-Chek Softclix Lancets lancets Use as instructed 100 each 5    Blood Glucose Monitoring Suppl (Accu-Chek Guide) w/Device kit Use as directed per Prescriber 1 kit 0    empagliflozin (Jardiance) 25 MG tablet tablet Take 1 tablet by mouth Daily. 30 tablet 6    glucose blood test strip Use as directed by provider to check blood sugar twice daily. 100 each 5    insulin degludec (Tresiba FlexTouch) 100 UNIT/ML solution pen-injector injection Inject 20 Units under the skin into the appropriate area  as directed Every Night. 6 mL 5    Insulin Pen Needle (Pen Needles) 32G X 4 MM misc Use 1 pen needle Every Night as directed by provider to inject insulin. 100 each 5    lisinopril (PRINIVIL,ZESTRIL) 2.5 MG tablet Take 1 tablet by mouth Daily.      omeprazole (priLOSEC) 40 MG capsule Take 1 capsule by mouth once daily (Patient taking differently: 1 capsule As Needed.) 90 capsule 1    Semaglutide, 1 MG/DOSE, (Ozempic, 1 MG/DOSE,) 4 MG/3ML solution pen-injector Inject 1 mg under the skin into the appropriate area as directed 1 (One) Time Per Week. 3 mL 2     No current facility-administered medications on file prior to visit.        Review of Systems   Constitutional:  Positive for diaphoresis, fatigue and unexpected weight loss. Negative for unexpected weight gain.   Eyes:  Positive for blurred vision.   Endocrine: Positive for polydipsia and polyuria. Negative for polyphagia.   Psychiatric/Behavioral:  Positive for sleep disturbance.    All other systems reviewed and are negative.   Symptoms are present but improving.    /62 (BP Location: Right arm, Patient Position: Sitting, Cuff Size: Large Adult)   Pulse 108   Wt 79.7 kg (175 lb 9.6 oz)   SpO2 97%   BMI 25.93 kg/m²      Physical Exam  Vitals reviewed.   Constitutional:       Appearance: Normal appearance.   Eyes:      Extraocular Movements: Extraocular movements intact.   Cardiovascular:      Rate and Rhythm: Normal rate.   Pulmonary:      Effort: Pulmonary effort is normal.   Skin:     General: Skin is warm.   Neurological:      General: No focal deficit present.      Mental Status: She is alert and oriented to person, place, and time.   Psychiatric:         Mood and Affect: Mood normal.         Behavior: Behavior normal.         Thought Content: Thought content normal.         Judgment: Judgment normal.       CMP:  Lab Results   Component Value Date    Glucose 89 05/27/2025    Glucose 100 05/27/2025    BUN 9.0 05/27/2025    BUN/Creatinine Ratio 13.8  05/27/2025    Creatinine 0.65 05/27/2025    Creatinine, Urine 38.0 04/30/2024    Creatinine, Urine 86.5 08/29/2019    CO2 20.0 (L) 05/27/2025    Calcium 8.6 05/27/2025    Albumin 4.2 05/27/2025    AST (SGOT) 28 05/27/2025    ALT (SGPT) 13 05/27/2025     Lipid Panel:  Lab Results   Component Value Date    CHOL 174 05/27/2025    TRIG 206 (H) 05/27/2025    HDL 29 (L) 05/27/2025    VLDL 36 05/27/2025     (H) 05/27/2025     HbA1c:  Lab Results   Component Value Date    HGBA1C 5.6 05/27/2025   7.4 (03/2024)    Glucose:  Lab Results   Component Value Date    POCGLU 100 05/27/2025     Microalbumin:  Lab Results   Component Value Date    MALBCRERATIO <8 04/30/2024     TSH:  Lab Results   Component Value Date    TSH 0.028 (L) 05/27/2025       Assessment and Plan    Diagnoses and all orders for this visit:    1. Type 2 diabetes mellitus with hyperglycemia, without long-term current use of insulin (Primary)  Assessment & Plan:  -Diabetes is at goal with A1c 5.6.  -Discussed dietary and exercise guidelines with patient.    -Discussed the importance of yearly eye exams.  -Discussed the importance of checking BG's regularly.    -Continue Ozempic 1 mg weekly.  Patient has no personal history of pancreatitis, no family history of MEN syndrome or medullary thyroid cancer. Possible side effects including nausea, bloating, other GI upset and rarely pancreatitis were discussed. She was advised to call the office with any symptoms or concerns.   -Continue Tresiba 20 units QHS.  -Continue Jardiance 25 mg QD. Discussed the medication's MOA and that it may cause more frequent urination particularly upon starting the medication. Take one tablet in the morning with or without food. Encouraged to drink plenty of water as to not get dehydrated especially in warmer weather or with activity. Also discussed there may be an increased incidence of UTIs or yeast infections and to monitor for signs/symptoms.  -S/S hypoglycemia reviewed with  Rule of 15's advised.  -Follow-up in 3 months.     Orders:  -     POC Glycosylated Hemoglobin (Hb A1C)  -     POC Glucose, Blood  -     Comprehensive Metabolic Panel  -     Lipid Panel  -     TSH  -     T4, free  -     Microalbumin / Creatinine Urine Ratio - Urine, Clean Catch             Return in about 3 months (around 8/27/2025) for Follow-up appointment, A1C. The patient was instructed to contact the clinic with any interval questions or concerns.        This document has been electronically signed by BRIAN Zelaya  May 30, 2025 11:29 EDT   Endocrinology    Please note that portions of this document were completed with a voice recognition program. Efforts were made to edit the dictations, but occasionally words are mis-transcribed.

## 2025-05-30 NOTE — ASSESSMENT & PLAN NOTE
-Diabetes is at goal with A1c 5.6.  -Discussed dietary and exercise guidelines with patient.    -Discussed the importance of yearly eye exams.  -Discussed the importance of checking BG's regularly.    -Continue Ozempic 1 mg weekly.  Patient has no personal history of pancreatitis, no family history of MEN syndrome or medullary thyroid cancer. Possible side effects including nausea, bloating, other GI upset and rarely pancreatitis were discussed. She was advised to call the office with any symptoms or concerns.   -Continue Tresiba 20 units QHS.  -Continue Jardiance 25 mg QD. Discussed the medication's MOA and that it may cause more frequent urination particularly upon starting the medication. Take one tablet in the morning with or without food. Encouraged to drink plenty of water as to not get dehydrated especially in warmer weather or with activity. Also discussed there may be an increased incidence of UTIs or yeast infections and to monitor for signs/symptoms.  -S/S hypoglycemia reviewed with Rule of 15's advised.  -Follow-up in 3 months.

## 2025-06-09 RX ORDER — SEMAGLUTIDE 1.34 MG/ML
1 INJECTION, SOLUTION SUBCUTANEOUS WEEKLY
Qty: 3 ML | Refills: 2 | Status: SHIPPED | OUTPATIENT
Start: 2025-06-09

## 2025-08-26 RX ORDER — LEVOTHYROXINE SODIUM 100 UG/1
100 TABLET ORAL DAILY
Qty: 90 TABLET | Refills: 1 | Status: SHIPPED | OUTPATIENT
Start: 2025-08-26